# Patient Record
Sex: FEMALE | Race: WHITE | NOT HISPANIC OR LATINO | Employment: UNEMPLOYED | URBAN - METROPOLITAN AREA
[De-identification: names, ages, dates, MRNs, and addresses within clinical notes are randomized per-mention and may not be internally consistent; named-entity substitution may affect disease eponyms.]

---

## 2018-10-30 ENCOUNTER — EVALUATION (OUTPATIENT)
Dept: PHYSICAL THERAPY | Facility: CLINIC | Age: 38
End: 2018-10-30
Payer: COMMERCIAL

## 2018-10-30 ENCOUNTER — TRANSCRIBE ORDERS (OUTPATIENT)
Dept: PHYSICAL THERAPY | Facility: CLINIC | Age: 38
End: 2018-10-30

## 2018-10-30 DIAGNOSIS — M25.561 CHRONIC PAIN OF RIGHT KNEE: Primary | ICD-10-CM

## 2018-10-30 DIAGNOSIS — M25.561 ACUTE PAIN OF RIGHT KNEE: Primary | ICD-10-CM

## 2018-10-30 DIAGNOSIS — G89.29 CHRONIC PAIN OF RIGHT KNEE: Primary | ICD-10-CM

## 2018-10-30 PROCEDURE — 97161 PT EVAL LOW COMPLEX 20 MIN: CPT | Performed by: PHYSICAL THERAPIST

## 2018-10-30 PROCEDURE — 97140 MANUAL THERAPY 1/> REGIONS: CPT | Performed by: PHYSICAL THERAPIST

## 2018-10-30 PROCEDURE — G8979 MOBILITY GOAL STATUS: HCPCS | Performed by: PHYSICAL THERAPIST

## 2018-10-30 PROCEDURE — G8978 MOBILITY CURRENT STATUS: HCPCS | Performed by: PHYSICAL THERAPIST

## 2018-10-30 NOTE — LETTER
2018    Luias Woodard MD  [address] [facility]  [address]  [address] [employer]    Patient: Ofelia Branham   YOB: 1980   Date of Visit: 10/30/2018     Encounter Diagnosis     ICD-10-CM    1  Acute pain of right knee M25 561        Dear Dr Campa Pro:    Please review the attached Plan of Care from [facility] Carmelo's recent visit  Please verify that you agree therapy should continue by signing the attached document and sending it back to our office  If you have any questions or concerns, please don't hesitate to call  Sincerely,    Sergey Bhatia, PT      Referring Provider:      I certify that I have read the below Plan of Care and certify the need for these services furnished under this plan of treatment while under my care  Luisa Woodard MD  [address] [facility]  [address] [facility]: [phone]          PT Evaluation     Today's date: 10/30/2018  Patient name: Ofelia Branham  : 1980  MRN: [id number]  Referring provider: Wil Wilkerson MD  Dx:   Encounter Diagnosis     ICD-10-CM    1  Chronic pain of right knee M25 561     G89 29                   Assessment  Impairments: abnormal or restricted ROM, activity intolerance, impaired physical strength, lacks appropriate home exercise program and pain with function    Assessment details: Pt is a 43year old female presenting to outpatient Physical Therapy with primary complaints of right knee pain  Pt presents with decreased mobility of R patella, tight R Iliopoas musculature, decreased R knee flexion AROM with pain, and overall decreased functional mobility  These physical deficits are preventing the patient from participating in usual activities such as ascending/descening stairs with a reciprocal pattern, walking for prolonged periods of time, and running for exercise  Pt would benefit from skilled PT services in order to address these deficits and reach maximum level of function   Thank you kindly for the referral!  Barriers to therapy: None  Understanding of Dx/Px/POC: good   Prognosis: good    Goals  ST  The patient will be fully compliant with HEP within two weeks  2 Pt will demonstrate equal bilateral girth at the patella joint indicating resolved R knee swelling within three weeks  3 Pt will demonstrate equal B knee flexion AROM without pain within three weeks  LT  The patient will increase FOTO score to 83 within eight weeks  2 The patient will report full return to running for exercise indicating return to functional baseline within eight weeks  3 Pt will demonstrate 5/5 R knee strength in order to ascend/descend stairs with a reciprocal pattern within eight weeks  Plan  Patient would benefit from: skilled physical therapy  Planned modality interventions: thermotherapy: hydrocollator packs, cryotherapy, ultrasound and TENS  Planned therapy interventions: therapeutic activities, therapeutic exercise, home exercise program, manual therapy, joint mobilization, balance, patient education, neuromuscular re-education, stretching and graded exercise  Frequency: 2x week  Duration in weeks: 8  Plan of Care beginning date: 10/30/2018  Plan of Care expiration date: 2018  Treatment plan discussed with: patient        Subjective Evaluation    History of Present Illness  Date of onset: 10/1/2018  Mechanism of injury: Pt's pain began in her R knee with swelling after she began running for exercise in the beginning of September  She was running for a month when the knee pain began  She was running intervals five days a week using a couch to [employer]  She was up to running a total of a mile and half  She went to see her PCP on  for the pain, who told her to rest, ice, and elevate  She notes that the pain then went down but continued to be "swollen in spots " At the end of each day the knee feels stiff, hot, and sore     Not a recurrent problem   Pain  Current pain rating: 0  At best pain ratin  At worst pain ratin  Location: R knee  Quality: dull ache ("Stiffness", "tightness")  Relieving factors: ice, medications, relaxation and rest  Aggravating factors: running, stair climbing and walking    Social Support  Steps to enter house: yes  Stairs in house: yes   Lives in: multiple-level home  Lives with: spouse and young children      Diagnostic Tests  X-ray: normal  Patient Goals  Patient goals for therapy: increased motion, decreased pain, decreased edema and return to sport/leisure activities  Patient goal: return to running for exercise        Objective     Static Posture     Ankle/Foot   Ankle/Foot (Left): Pes planus  Ankle/Foot (Right): Pes planus  Joint Play     Additional Joint Play Details  R patella demonstrated hypomobility with medial glides  Strength/Myotome Testing     Left Hip   Planes of Motion   Flexion: 4+  Extension: 4+  Abduction: 4+    Right Hip   Planes of Motion   Flexion: 4  Extension: 4  Abduction: 4+    Left Knee   Flexion: 4+  Extension: 4+    Right Knee   Flexion: 4  Extension: 4    Left Ankle/Foot   Dorsiflexion: 5  Plantar flexion: 5  Inversion: 5  Eversion: 5    Right Ankle/Foot   Dorsiflexion: 5  Plantar flexion: 5  Inversion: 5  Eversion: 5    Tests     Left Hip   Negative modified Jarocho  Right Hip   Positive Ely's  Swelling     Left Knee Girth Measurement (cm)   Joint line: 34 cm    Right Knee Girth Measurement (cm)   Joint line: 36 cm    Functional Assessment   Squat   Pain and sitting toward left side         Flowsheet Rows      Most Recent Value   PT/OT G-Codes   Current Score  76   Projected Score  83          Precautions: Standard  Daily Treatment Diary     Manual  10/30            R patellar medial glides 8'                                                                    Exercise Diary              Stationary Bike             Calf Stretch At Block             Standing Hip flexor stretch with stool             Step ups/downs             Lateral step Ups             Mini Squats             SLS on pad             Monster Walks with TB                          Dc Khalilught with Nelida GONZALEZR              S/L hip Abduction             Prone Hip extension                          Treadmill When Ready                                                                                 Modalities              CP

## 2018-10-30 NOTE — PROGRESS NOTES
PT Evaluation     Today's date: 10/30/2018  Patient name: Joellen Ferro  : 1980  MRN: [id number]  Referring provider: Michelle Bowles MD  Dx:   Encounter Diagnosis     ICD-10-CM    1  Chronic pain of right knee M25 561     G89 29                   Assessment  Impairments: abnormal or restricted ROM, activity intolerance, impaired physical strength, lacks appropriate home exercise program and pain with function    Assessment details: Pt is a 43year old female presenting to outpatient Physical Therapy with primary complaints of right knee pain  Pt presents with decreased mobility of R patella, tight R Iliopoas musculature, decreased R knee flexion AROM with pain, and overall decreased functional mobility  These physical deficits are preventing the patient from participating in usual activities such as ascending/descening stairs with a reciprocal pattern, walking for prolonged periods of time, and running for exercise  Pt would benefit from skilled PT services in order to address these deficits and reach maximum level of function  Thank you kindly for the referral!  Barriers to therapy: None  Understanding of Dx/Px/POC: good   Prognosis: good    Goals  ST  The patient will be fully compliant with HEP within two weeks  2 Pt will demonstrate equal bilateral girth at the patella joint indicating resolved R knee swelling within three weeks  3 Pt will demonstrate equal B knee flexion AROM without pain within three weeks  LT  The patient will increase FOTO score to 83 within eight weeks  2 The patient will report full return to running for exercise indicating return to functional baseline within eight weeks  3 Pt will demonstrate 5/5 R knee strength in order to ascend/descend stairs with a reciprocal pattern within eight weeks       Plan  Patient would benefit from: skilled physical therapy  Planned modality interventions: thermotherapy: hydrocollator packs, cryotherapy, ultrasound and TENS  Planned therapy interventions: therapeutic activities, therapeutic exercise, home exercise program, manual therapy, joint mobilization, balance, patient education, neuromuscular re-education, stretching and graded exercise  Frequency: 2x week  Duration in weeks: 8  Plan of Care beginning date: 10/30/2018  Plan of Care expiration date: 2018  Treatment plan discussed with: patient        Subjective Evaluation    History of Present Illness  Date of onset: 10/1/2018  Mechanism of injury: Pt's pain began in her R knee with swelling after she began running for exercise in the beginning of September  She was running for a month when the knee pain began  She was running intervals five days a week using a couch to [employer]  She was up to running a total of a mile and half  She went to see her PCP on  for the pain, who told her to rest, ice, and elevate  She notes that the pain then went down but continued to be "swollen in spots " At the end of each day the knee feels stiff, hot, and sore  Not a recurrent problem   Pain  Current pain ratin  At best pain ratin  At worst pain ratin  Location: R knee  Quality: dull ache ("Stiffness", "tightness")  Relieving factors: ice, medications, relaxation and rest  Aggravating factors: running, stair climbing and walking    Social Support  Steps to enter house: yes  Stairs in house: yes   Lives in: multiple-level home  Lives with: spouse and young children      Diagnostic Tests  X-ray: normal  Patient Goals  Patient goals for therapy: increased motion, decreased pain, decreased edema and return to sport/leisure activities  Patient goal: return to running for exercise        Objective     Static Posture     Ankle/Foot   Ankle/Foot (Left): Pes planus  Ankle/Foot (Right): Pes planus  Joint Play     Additional Joint Play Details  R patella demonstrated hypomobility with medial glides       Strength/Myotome Testing     Left Hip   Planes of Motion Flexion: 4+  Extension: 4+  Abduction: 4+    Right Hip   Planes of Motion   Flexion: 4  Extension: 4  Abduction: 4+    Left Knee   Flexion: 4+  Extension: 4+    Right Knee   Flexion: 4  Extension: 4    Left Ankle/Foot   Dorsiflexion: 5  Plantar flexion: 5  Inversion: 5  Eversion: 5    Right Ankle/Foot   Dorsiflexion: 5  Plantar flexion: 5  Inversion: 5  Eversion: 5    Tests     Left Hip   Negative modified Jarocho  Right Hip   Positive Ely's  Swelling     Left Knee Girth Measurement (cm)   Joint line: 34 cm    Right Knee Girth Measurement (cm)   Joint line: 36 cm    Functional Assessment   Squat   Pain and sitting toward left side         Flowsheet Rows      Most Recent Value   PT/OT G-Codes   Current Score  76   Projected Score  83          Precautions: Standard  Daily Treatment Diary     Manual  10/30            R patellar medial glides 8'                                                                    Exercise Diary              Stationary Bike             Calf Stretch At Block             Standing Hip flexor stretch with stool             Step ups/downs             Lateral step Ups             Mini Squats             SLS on pad             Monster Walks with TB                          Evan Stretch             Bridges with Marches             SLR              S/L hip Abduction             Prone Hip extension                          Treadmill When Ready                                                                                 Modalities              CP

## 2018-11-01 ENCOUNTER — OFFICE VISIT (OUTPATIENT)
Dept: PHYSICAL THERAPY | Facility: CLINIC | Age: 38
End: 2018-11-01
Payer: COMMERCIAL

## 2018-11-01 DIAGNOSIS — M25.561 ACUTE PAIN OF RIGHT KNEE: Primary | ICD-10-CM

## 2018-11-01 PROCEDURE — 97110 THERAPEUTIC EXERCISES: CPT | Performed by: PHYSICAL THERAPIST

## 2018-11-01 PROCEDURE — 97140 MANUAL THERAPY 1/> REGIONS: CPT | Performed by: PHYSICAL THERAPIST

## 2018-11-01 PROCEDURE — 97112 NEUROMUSCULAR REEDUCATION: CPT | Performed by: PHYSICAL THERAPIST

## 2018-11-01 NOTE — PROGRESS NOTES
Daily Note     Today's date: 2018  Patient name: Lucy Carter  : 1980  MRN: [id number]  Referring provider: Shirlee Boas, MD  Dx:   Encounter Diagnosis     ICD-10-CM    1  Acute pain of right knee M25 561                   Subjective: Pt reports that she has not had any changes in R knee pain since there initial evaluation      Objective: See treatment diary below      Assessment: Pt demonstrated moderate crepitus of R knee during TE with high levels of flexion, such as step ups and step downs  Her crepitus decreased as she progressed through weight bearing TE with knee flexion, such as mini squats  Pt demonstrated R quad weakness, as she was unable to perform step downs leading with L LE using 8" step without compensation of internal hip rotation  Pt was able to complete the exercise with good form using the 6" step  Pt continues to demonstrate R>L iliopsoas muscle tightness, with hip flexion during prone quad stretch  Plan: Continue per plan of care         Precautions: Standard  Daily Treatment Diary     Manual  10/30 11/1           R patellar medial glides 8' 8'                                                                   Exercise Diary              Stationary Bike 5'            Calf Stretch At Block 3 x30"            Standing Hip flexor stretch with stool 3 x30"            Step ups/downs Up: 8"  Down: 6"  x20            Lateral step Ups 8"  x20            Mini Squats with slope x20            SLS on pad NP            Kristopher Walks with TB NP                         Evan Stretch 2 x30" DC           Prone quad stretch with strap 3 x30"            Bridges with Marches 2 x15            SLR  2 x15 each            S/L hip Abduction             Prone Hip extension                          Treadmill When Ready                                                                                 Modalities              CP

## 2018-11-05 ENCOUNTER — OFFICE VISIT (OUTPATIENT)
Dept: PHYSICAL THERAPY | Facility: CLINIC | Age: 38
End: 2018-11-05
Payer: COMMERCIAL

## 2018-11-05 DIAGNOSIS — M25.561 ACUTE PAIN OF RIGHT KNEE: Primary | ICD-10-CM

## 2018-11-05 PROCEDURE — 97112 NEUROMUSCULAR REEDUCATION: CPT | Performed by: PHYSICAL THERAPIST

## 2018-11-05 PROCEDURE — 97110 THERAPEUTIC EXERCISES: CPT | Performed by: PHYSICAL THERAPIST
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
## 2018-11-08 ENCOUNTER — OFFICE VISIT (OUTPATIENT)
Dept: PHYSICAL THERAPY | Facility: CLINIC | Age: 38
End: 2018-11-08
Payer: COMMERCIAL

## 2018-11-08 DIAGNOSIS — M25.561 ACUTE PAIN OF RIGHT KNEE: Primary | ICD-10-CM

## 2018-11-08 PROCEDURE — 97110 THERAPEUTIC EXERCISES: CPT | Performed by: PHYSICAL THERAPIST

## 2018-11-08 NOTE — PROGRESS NOTES
Daily Note     Today's date: 2018  Patient name: Evelina Castorena  : 1980  MRN: [id number]  Referring provider: Matilde Bella MD  Dx:   Encounter Diagnosis     ICD-10-CM    1  Acute pain of right knee M25 561                   Subjective:Pt reports that she did not experience any knee swelling following her last PT visit  She notes that there are no new changes to report  Objective: See treatment diary below      Assessment: Pt tolerated TE well, with no complaints of pain during squats or SLS  She continues to demonstrate moderate hypomobility of R patella medially, which improves mildly with manual glides  Per pt request she was given a comprehensive HEP and will be changing sessions to a half hour due to financial reasons  Plan: Progress treatment as tolerated              Precautions: Standard  Daily Treatment Diary     Manual  10/30 11/1 11/8          R patellar medial glides 8' 8' 5'                                                                  Exercise Diary            Stationary Bike 5' 8' 8'          Calf Stretch At Block 3 x30" Seated with strap  3 x30" 3 x30" each HEP         Standing Hip flexor stretch with stool 3 x30" NP HEP          Step ups/downs Up: 8"  Down: 6"  x20 NP NV          Lateral step Ups 8"  x20 NP NV          Mini Squats with slope x20 NP 3 x10          SLS on pad NP Black Pad  10" x5 20" x5          Monster Walks with TB NP NP NV                       Prone quad stretch with strap 3 x30" 3 x30"  3 x30"          Bridges with Marches 2 x15 2 x10 HEP          SLR  2 x15 each 2 x15 each HEP          S/L hip Abduction  2 x15 each HEP          Prone Hip extension  2x15 each HEP          Bird Dog  x10 each HEP          Treadmill When Ready            Seated Foot Arch Raises  x30 each x40 each                                                                  Modalities              CP                                           HEP: Cullen Carroll kneeling hip flexor stretch, gastroc and soleus stretch, bird dogs, bridges with marches, SLR into hip abd, flexion, and extension

## 2018-11-12 ENCOUNTER — OFFICE VISIT (OUTPATIENT)
Dept: PHYSICAL THERAPY | Facility: CLINIC | Age: 38
End: 2018-11-12
Payer: COMMERCIAL

## 2018-11-12 DIAGNOSIS — M25.561 ACUTE PAIN OF RIGHT KNEE: Primary | ICD-10-CM

## 2018-11-12 PROCEDURE — 97110 THERAPEUTIC EXERCISES: CPT

## 2018-11-12 PROCEDURE — 97112 NEUROMUSCULAR REEDUCATION: CPT

## 2018-11-12 NOTE — PROGRESS NOTES
Daily Note     Today's date: 2018  Patient name: Eve Severs  : 1980  MRN: [id number]  Referring provider: Ledy Delgado MD  Dx:   Encounter Diagnosis     ICD-10-CM    1  Acute pain of right knee M25 561                   Subjective:Pt states her knee feels the same, still gets clicking and popping  Objective: See treatment diary below      Assessment: Performed below with fair tolerance and good technique  Continue to progress as tolerated upon nv  Plan: Progress treatment as tolerated              Precautions: Standard  Daily Treatment Diary     Manual  10/30 11/1 11/8 11/2         R patellar medial glides 8' 8' 5' 5'                                                                 Exercise Diary           Stationary Bike 5' 8' 8' 8'         Calf Stretch At Block 3 x30" Seated with strap  3 x30" 3 x30" each HEP         Standing Hip flexor stretch with stool 3 x30" NP HEP hep         Step ups/downs Up: 8"  Down: 6"  x20 NP NV Up: 8"  Down: 6"  x20         Lateral step Ups 8"  x20 NP NV 8"  x20         Mini Squats with slope x20 NP 3 x10 nv         SLS on pad NP Black Pad  10" x5 20" x5 20"X5         Monster Walks with TB NP NP NV 4laps  OTB                      Prone quad stretch with strap 3 x30" 3 x30"  3 x30" 3x30"         Bridges with Marches 2 x15 2 x10 HEP hep         SLR  2 x15 each 2 x15 each HEP hep         S/L hip Abduction  2 x15 each HEP hep         Prone Hip extension  2x15 each HEP hep         Bird Dog  x10 each HEP hep         Treadmill When Ready            Seated Foot Arch Raises  x30 each x40 each x40  ea                                                                 Modalities              CP                                           HEP: Evan Stretch, kneeling hip flexor stretch, gastroc and soleus stretch, bird dogs, bridges with marches, SLR into hip abd, flexion, and extension

## 2018-11-15 ENCOUNTER — OFFICE VISIT (OUTPATIENT)
Dept: PHYSICAL THERAPY | Facility: CLINIC | Age: 38
End: 2018-11-15
Payer: COMMERCIAL

## 2018-11-15 DIAGNOSIS — M25.561 ACUTE PAIN OF RIGHT KNEE: Primary | ICD-10-CM

## 2018-11-15 PROCEDURE — 97110 THERAPEUTIC EXERCISES: CPT

## 2018-11-15 NOTE — PROGRESS NOTES
Daily Note     Today's date: 11/15/2018  Patient name: Margy Lai  : 1980  MRN: [id number]  Referring provider: Mady Watson MD  Dx:   Encounter Diagnosis     ICD-10-CM    1  Acute pain of right knee M25 561                   Subjective:Pt states no major difference  Advised pt to ice to aid in muscle soreness  Objective: See treatment diary below      Assessment: Performed below TE and introduced new TE with good tolerance and fair technique  Noted during step ups, pt R knee goes into genu valgus, used a BTB to promote abductor strengthening/genu varus  As well noted during monster walk pt will go into hip ER, post correction technique was much better  Continue to monitor form of pt to engage appropriate muscles throughout TE  Plan: Progress treatment as tolerated              Precautions: Standard  Daily Treatment Diary     Manual  10/30 11/1 11/8 11/2 11/15        R patellar medial glides 8' 8' 5' 5' nv                                                                Exercise Diary  11/1 11/5 11/8 11/12 11/15        Stationary Bike 5' 8' 8' 8' np        Calf Stretch At Block 3 x30" Seated with strap  3 x30" 3 x30" each HEP hep        Standing Hip flexor stretch with stool 3 x30" NP HEP hep hep        Step ups/downs Up: 8"  Down: 6"  x20 NP NV Up: 8"  Down: 6"  x20 Up:  8"  x20  w/ BTB   Pull into valgus        Lateral step Ups 8"  x20 NP NV 8"  x20 6"  x20  (laterla dips)        Mini Squats with slope x20 NP 3 x10 nv 3x10  On bosu        SLS on pad NP Black Pad  10" x5 20" x5 20"X5 np        Monster Walks with TB NP NP NV 4laps  OTB 4laps  GTB                     Prone quad stretch with strap 3 x30" 3 x30"  3 x30" 3x30" np        Bridges with Marches 2 x15 2 x10 HEP hep hep        SLR  2 x15 each 2 x15 each HEP hep hep        S/L hip Abduction  2 x15 each HEP hep hep        Prone Hip extension  2x15 each HEP hep hep        Bird Dog  x10 each HEP hep hep        Treadmill When Ready 5'  2 0mph        Seated Foot Arch Raises  x30 each x40 each x40  ea hep        Forward dips     L1  2x10        Combo  chair       L3   2W  3"x20                                      Modalities  11/1            CP                                           HEP: Evan Stretch, kneeling hip flexor stretch, gastroc and soleus stretch, bird dogs, bridges with marches, SLR into hip abd, flexion, and extension

## 2018-11-19 ENCOUNTER — OFFICE VISIT (OUTPATIENT)
Dept: PHYSICAL THERAPY | Facility: CLINIC | Age: 38
End: 2018-11-19
Payer: COMMERCIAL

## 2018-11-19 DIAGNOSIS — M25.561 ACUTE PAIN OF RIGHT KNEE: Primary | ICD-10-CM

## 2018-11-19 PROCEDURE — 97110 THERAPEUTIC EXERCISES: CPT

## 2018-11-19 NOTE — PROGRESS NOTES
Daily Note     Today's date: 2018  Patient name: Joellen Ferro  : 1980  MRN: [id number]  Referring provider: Michelle Bowles MD  Dx:   Encounter Diagnosis     ICD-10-CM    1  Acute pain of right knee M25 561                   Subjective:Pt states feeling sore Thursday night after her visit into Saturday, felt her knee very tired and sore  Objective: See treatment diary below      Assessment: Performed below TE with good tolerance and technique  Trial k-tape before performing TE, no significant change; however, continue to monitor symptoms  Noted R knee still goes into genu valgus, introduced a GTB during minisquats on bosu  Post VCs and TCs pt form improved  Continue to monitor form of pt to engage appropriate muscles throughout TE  Plan: Progress treatment as tolerated              Precautions: Standard  Daily Treatment Diary     Manual  10/30 11/1 11/8 11/2 11/15 11/19       R patellar medial glides 8' 8' 5' 5' nv 5'       k-tape      3'             8'                                     Exercise Diary  11/1 11/5 11/8 11/12 11/15 11/19       Stationary Bike 5' 8' 8' 8' np        Calf Stretch At Block 3 x30" Seated with strap  3 x30" 3 x30" each HEP hep hep       Standing Hip flexor stretch with stool 3 x30" NP HEP hep hep Hep         Step ups/downs Up: 8"  Down: 6"  x20 NP NV Up: 8"  Down: 6"  x20 Up:  8"  x20  w/ BTB   Pull into valgus Up:  8"  x20  w/ band   Pull into valgus       Lateral step Ups 8"  x20 NP NV 8"  x20 6"  x20  (laterla dips) 6"  x20  (lateral dips)       Mini Squats with slope x20 NP 3 x10 nv 3x10  On bosu 3x10  On bosu  W/ GTB       SLS on pad NP Black Pad  10" x5 20" x5 20"X5 np        [de-identified] Walks with TB NP NP NV 4laps  OTB 4laps  GTB 4laps  GTB                    Prone quad stretch with strap 3 x30" 3 x30"  3 x30" 3x30" np np       Bridges with Marches 2 x15 2 x10 HEP hep hep hep       SLR  2 x15 each 2 x15 each HEP hep hep hep       S/L hip Abduction  2 x15 each HEP hep hep hep       Prone Hip extension  2x15 each HEP hep hep hep       Bird Dog  x10 each HEP hep hep hep       Treadmill When Ready    5'  2 0mph 5'  2 0mph       Seated Foot Arch Raises  x30 each x40 each x40  ea hep hep       Forward dips     L1  2x10 L1  2x10       Combo  chair       L3   2W  3"x20 L3  2W  3"x20       Clamshells      GTB  10"X10                        Modalities  11/1            CP                                           HEP: Evan Stretch, kneeling hip flexor stretch, gastroc and soleus stretch, bird dogs, bridges with marches, SLR into hip abd, flexion, and extension

## 2018-11-28 ENCOUNTER — OFFICE VISIT (OUTPATIENT)
Dept: PHYSICAL THERAPY | Facility: CLINIC | Age: 38
End: 2018-11-28
Payer: COMMERCIAL

## 2018-11-28 DIAGNOSIS — M25.561 ACUTE PAIN OF RIGHT KNEE: Primary | ICD-10-CM

## 2018-11-28 PROCEDURE — G8979 MOBILITY GOAL STATUS: HCPCS

## 2018-11-28 PROCEDURE — G8978 MOBILITY CURRENT STATUS: HCPCS

## 2018-11-28 PROCEDURE — 97110 THERAPEUTIC EXERCISES: CPT

## 2018-11-28 PROCEDURE — 97112 NEUROMUSCULAR REEDUCATION: CPT

## 2018-11-28 PROCEDURE — 97140 MANUAL THERAPY 1/> REGIONS: CPT

## 2018-11-28 NOTE — PROGRESS NOTES
Daily Note     Today's date: 2018  Patient name: Edwardo Williamson  : 1980  MRN: [id number]  Referring provider: Jayda Orellana MD  Dx:   Encounter Diagnosis     ICD-10-CM    1  Acute pain of right knee M25 561                   Subjective: Pt states k-tape has helped her pain/discomfort a lot  Objective: See treatment diary below      Assessment: Introduced new TE and progressed TE with good tolerance and technique  Continue to utilize k-tape before TE during the session, monitor symptoms upon nv  Plan: Progress treatment as tolerated  Dispensed strengthen HEP to pt upon nv             Precautions: Standard  Daily Treatment Diary     Manual  10/30 11/1 11/8 11/2 11/15 11/19 11/28      R patellar medial glides 8' 8' 5' 5' nv 5' 5      k-tape      3' 3            8' 8'                                    Exercise Diary  11/1 11/5 11/8 11/12 11/15 11/19 11/28      Stationary Bike 5' 8' 8' 8' np        Calf Stretch At Block 3 x30" Seated with strap  3 x30" 3 x30" each HEP hep hep Hep      Standing Hip flexor stretch with stool 3 x30" NP HEP hep hep Hep   hep      Step ups/downs Up: 8"  Down: 6"  x20 NP NV Up: 8"  Down: 6"  x20 Up:  8"  x20  w/ BTB   Pull into valgus Up:  8"  x20  w/ band   Pull into valgus Up:  8"  x30  w/ band   Pull into valgus      Lateral step Ups 8"  x20 NP NV 8"  x20 6"  x20  (laterla dips) 6"  x20  (lateral dips) 6"  x20  (lateral dips)      Mini Squats with slope x20 NP 3 x10 nv 3x10  On bosu 3x10  On bosu  W/ GTB 3x10  On bosu  W/ GTB      SLS on pad NP Black Pad  10" x5 20" x5 20"X5 np  30"x3      Monster Walks with TB NP NP NV 4laps  OTB 4laps  GTB 4laps  GTB 4laps  GTB                     Prone quad stretch with strap 3 x30" 3 x30"  3 x30" 3x30" np np np      Bridges with Marches 2 x15 2 x10 HEP hep hep hep hep      SLR  2 x15 each 2 x15 each HEP hep hep hep hep      S/L hip Abduction  2 x15 each HEP hep hep hep hep      Prone Hip extension  2x15 each HEP hep hep hep hep      Bird Dog  x10 each HEP hep hep hep hep      Treadmill When Ready    5'  2 0mph 5'  2 0mph 5'  2 0mph      Seated Foot Arch Raises  x30 each x40 each x40  ea hep hep hep      Forward dips     L1  2x10 L1  2x10 L2  2x10      Combo  chair       L3   2W  3"x20 L3  2W  3"x20 L3  2W  3"x20      Clamshells      GTB  10"X10 BTB  10x10"      Bridges w/ fitness [place] lite       10"X10          Modalities  11/1            CP                                           HEP: Evan Stretch, kneeling hip flexor stretch, gastroc and soleus stretch, bird dogs, bridges with marches, SLR into hip abd, flexion, and extension

## 2018-12-03 ENCOUNTER — OFFICE VISIT (OUTPATIENT)
Dept: PHYSICAL THERAPY | Facility: CLINIC | Age: 38
End: 2018-12-03
Payer: COMMERCIAL

## 2018-12-03 DIAGNOSIS — M25.561 ACUTE PAIN OF RIGHT KNEE: Primary | ICD-10-CM

## 2018-12-03 PROCEDURE — 97110 THERAPEUTIC EXERCISES: CPT

## 2018-12-03 PROCEDURE — 97140 MANUAL THERAPY 1/> REGIONS: CPT

## 2018-12-03 NOTE — PROGRESS NOTES
Daily Note     Today's date: 12/3/2018  Patient name: Margy Lai  : 1980  MRN: [id number]  Referring provider: Mady Watson MD  Dx:   Encounter Diagnosis     ICD-10-CM    1  Acute pain of right knee M25 561                   Subjective: Pt states once she removed k-tape the knee started to bother her  Objective: See treatment diary below      Assessment: Focused on manuals due to R knee discomfort  Trial Leukotape to B feet to aid in decreasing R knee pain  Plan: Progress treatment as tolerated  Dispensed strengthen HEP to pt upon nv             Precautions: Standard  Daily Treatment Diary     Manual  10/30 11/1 11/8 11/2 11/15 11/19 11/28 12/3     R patellar medial glides 8' 8' 5' 5' nv 5' 5 5     k-tape      3' 3 3     Leukotape  B feet        10           8' 8' 18                                   Exercise Diary  11/1 11/5 11/8 11/12 11/15 11/19 11/28 12/3     Stationary Bike 5' 8' 8' 8' np        Calf Stretch At Block 3 x30" Seated with strap  3 x30" 3 x30" each HEP hep hep Hep -     Standing Hip flexor stretch with stool 3 x30" NP HEP hep hep Hep   hep -     Step ups/downs Up: 8"  Down: 6"  x20 NP NV Up: 8"  Down: 6"  x20 Up:  8"  x20  w/ BTB   Pull into valgus Up:  8"  x20  w/ band   Pull into valgus Up:  8"  x30  w/ band   Pull into valgus nv     Lateral step Ups 8"  x20 NP NV 8"  x20 6"  x20  (laterla dips) 6"  x20  (lateral dips) 6"  x20  (lateral dips) nv     Mini Squats with slope x20 NP 3 x10 nv 3x10  On bosu 3x10  On bosu  W/ GTB 3x10  On bosu  W/ GTB nv     SLS on pad NP Black Pad  10" x5 20" x5 20"X5 np  30"x3 nv     Monster Walks with TB NP NP NV 4laps  OTB 4laps  GTB 4laps  GTB 4laps  GTB   nv                  Prone quad stretch with strap 3 x30" 3 x30"  3 x30" 3x30" np np np np     Bridges with Marches 2 x15 2 x10 HEP hep hep hep hep -     SLR  2 x15 each 2 x15 each HEP hep hep hep hep -     S/L hip Abduction  2 x15 each HEP hep hep hep hep -     Prone Hip extension  2x15 each HEP hep hep hep hep -     Bird Dog  x10 each HEP hep hep hep hep -     Treadmill When Ready    5'  2 0mph 5'  2 0mph 5'  2 0mph 7'  2 0mph     Seated Foot Arch Raises  x30 each x40 each x40  ea hep hep hep -     Forward dips     L1  2x10 L1  2x10 L2  2x10 nv     Combo  chair       L3   2W  3"x20 L3  2W  3"x20 L3  2W  3"x20 nv     Clamshells      GTB  10"X10 BTB  10x10" BTB  10"X10     Bridges w/ fitness [place] lite       10"X10 10"x10         Modalities  11/1            CP                                           HEP: Evan Stretch, kneeling hip flexor stretch, gastroc and soleus stretch, bird dogs, bridges with marches, SLR into hip abd, flexion, and extension

## 2018-12-05 ENCOUNTER — OFFICE VISIT (OUTPATIENT)
Dept: PHYSICAL THERAPY | Facility: CLINIC | Age: 38
End: 2018-12-05
Payer: COMMERCIAL

## 2018-12-05 DIAGNOSIS — M25.561 ACUTE PAIN OF RIGHT KNEE: Primary | ICD-10-CM

## 2018-12-05 PROCEDURE — 97110 THERAPEUTIC EXERCISES: CPT

## 2018-12-05 PROCEDURE — 97140 MANUAL THERAPY 1/> REGIONS: CPT

## 2018-12-05 NOTE — PROGRESS NOTES
Daily Note     Today's date: 2018  Patient name: Evelina Castorena  : 1980  MRN: [id number]  Referring provider: Matilde Bella MD  Dx:   Encounter Diagnosis     ICD-10-CM    1  Acute pain of right knee M25 561                   Subjective: Pt states knee has not been hurting her, believe the leukotape and the k-tape on her R knee has helped her  Objective: See treatment diary below      Assessment: Performed below with fair tolerance and technique  Pt was unable to complete standing TE sets due to pain and clicking  Plan: Progress treatment as tolerated               Precautions: Standard  Daily Treatment Diary     Manual  10/30 11/1 11/8 11/2 11/15 11/19 11/28 12/3 12/5    R patellar medial glides 8' 8' 5' 5' nv 5' 5 5 nv    k-tape      3' 3 3 5    Leukotape  B feet        10 13          8' 8' 18 18                                  Exercise Diary  11/1 11/5 11/8 11/12 11/15 11/19 11/28 12/3 12    Stationary Bike 5' 8' 8' 8' np        Calf Stretch At Block 3 x30" Seated with strap  3 x30" 3 x30" each HEP hep hep Hep - hep    Standing Hip flexor stretch with stool 3 x30" NP HEP hep hep Hep   hep - hep    Step ups/downs Up: 8"  Down: 6"  x20 NP NV Up: 8"  Down: 6"  x20 Up:  8"  x20  w/ BTB   Pull into valgus Up:  8"  x20  w/ band   Pull into valgus Up:  8"  x30  w/ band   Pull into valgus nv Up:  8"  x30  w/ band   Pull into valgus    Lateral step Ups 8"  x20 NP NV 8"  x20 6"  x20  (laterla dips) 6"  x20  (lateral dips) 6"  x20  (lateral dips) nv 4"x10    Mini Squats with slope x20 NP 3 x10 nv 3x10  On bosu 3x10  On bosu  W/ GTB 3x10  On bosu  W/ GTB nv 3x10  On bosu  W/BTB    SLS on pad NP Black Pad  10" x5 20" x5 20"X5 np  30"x3 nv 30"X3  ea    Monster Walks with TB NP NP NV 4laps  OTB 4laps  GTB 4laps  GTB 4laps  GTB   nv 2laps  BTB                 Prone quad stretch with strap 3 x30" 3 x30"  3 x30" 3x30" np np np np     Bridges with [place] 2 x15 2 x10 HEP hep hep hep hep -     SLR  2 x15 each 2 x15 each HEP hep hep hep hep -     S/L hip Abduction  2 x15 each HEP hep hep hep hep -     Prone Hip extension  2x15 each HEP hep hep hep hep -     Bird Dog  x10 each HEP hep hep hep hep -     Treadmill When Ready    5'  2 0mph 5'  2 0mph 5'  2 0mph 7'  2 0mph 5'  2 0mph    Seated Foot Arch Raises  x30 each x40 each x40  ea hep hep hep -     Forward dips     L1  2x10 L1  2x10 L2  2x10 nv L1   10x    Combo  chair       L3   2W  3"x20 L3  2W  3"x20 L3  2W  3"x20 nv nv    Clamshells      GTB  10"X10 BTB  10x10" BTB  10"X10 hep    Bridges w/ fitness [place] lite       10"X10 10"x10 np    Forward lunges   On bosu         2x10  ea        Modalities  11/1            CP                                           HEP: Eavn Stretch, kneeling hip flexor stretch, gastroc and soleus stretch, bird dogs, bridges with marches, SLR into hip abd, flexion, and extension

## 2018-12-10 ENCOUNTER — EVALUATION (OUTPATIENT)
Dept: PHYSICAL THERAPY | Facility: CLINIC | Age: 38
End: 2018-12-10
Payer: COMMERCIAL

## 2018-12-10 DIAGNOSIS — M25.561 ACUTE PAIN OF RIGHT KNEE: Primary | ICD-10-CM

## 2018-12-10 PROCEDURE — 97140 MANUAL THERAPY 1/> REGIONS: CPT | Performed by: PHYSICAL THERAPIST

## 2018-12-10 PROCEDURE — 97110 THERAPEUTIC EXERCISES: CPT | Performed by: PHYSICAL THERAPIST

## 2018-12-10 NOTE — PROGRESS NOTES
Daily Note     Today's date: 12/10/2018  Patient name: Dunia Lopez  : 1980  MRN: [id number]  Referring provider: Devi Olivera MD  Dx:   Encounter Diagnosis     ICD-10-CM    1  Acute pain of right knee M25 561                   Subjective: Pt reports that she has an appointment to see a podiatrist about Orthotics for her R foot as she knows her feet have grown since she last had some made for her years ago  She continues to feel a pulling and clicking in her R knee that frustrates her  Objective: See treatment diary below      Assessment: Pt demonstrated significant hypomobility of her R inferior patella during medial glides, and moderate hypomobility inferiorly  Both of these directions provoked pt's symptoms and improved with more manual mobilizations  Pt also demonstrated involvement of R ITB during medial glides, indicating that the attachment to the patella is currently tight and would benefit from continued stretching and mobilizations  Pt responded well to the Leukotape, with improved patellar tracking during squats  Plan: Continue to focus on knee mobilizations and stretching of lateral patellar musculature           Precautions: Standard  Daily Treatment Diary     Manual  12/10            R patellar medial and inferior glides 12'            k-tape R lateral Patella            Leukotape  B feet                                                        Exercise Diary  12/10           Treadmill 5'           Step ups/downs  With pull band into [place]            Lateral step Ups +lateral dips             Mini Squats with slope on Bosu with BTB            SLS on pad with ball toss            [employer] with TB PTB                       ITB stretch with strap in supsine 3 x30"           Prone quad stretch with strap  (vastus latealis)  3 x10"           Forward dips            Combo  chair              Bridges w/ fitness [place] lite            Forward lunges   On bosu                Modalities CP                                           HEP: Evan Stretch, kneeling hip flexor stretch, gastroc and soleus stretch, bird dogs, bridges with marches, SLR into hip abd, flexion, and extension

## 2018-12-12 ENCOUNTER — OFFICE VISIT (OUTPATIENT)
Dept: PHYSICAL THERAPY | Facility: CLINIC | Age: 38
End: 2018-12-12
Payer: COMMERCIAL

## 2018-12-12 DIAGNOSIS — M25.561 ACUTE PAIN OF RIGHT KNEE: Primary | ICD-10-CM

## 2018-12-12 PROCEDURE — 97110 THERAPEUTIC EXERCISES: CPT | Performed by: PHYSICAL THERAPIST

## 2018-12-12 PROCEDURE — 97112 NEUROMUSCULAR REEDUCATION: CPT | Performed by: PHYSICAL THERAPIST

## 2018-12-12 NOTE — PROGRESS NOTES
Daily Note     Today's date: 2018  Patient name: Martha Sampson  : 1980  MRN: [id number]  Referring provider: Lian Wu MD  Dx:   Encounter Diagnosis     ICD-10-CM    1  Acute pain of right knee M25 561                   Subjective: Pt reports that she went to see her Podiatrist on Monday where he confirmed her diagnosis of patellar-femoral syndrome and felt confident that with a knee brace and new foot inserts she would start to feel better quickly  He also recommended her to continue to perform exercises supecific to runner's knee including yoga for a holistic approach  Objective: See treatment diary below      Assessment: Pt continues to demonstrate significant valgus of B knees during squats and step ups, indicating that the patient may benefit from increased focus of strengthening the gute medius  Plan: Continue with focus on knee eccentrics and glute med strengthening          Precautions: Standard  Daily Treatment Diary     Manual  12/10            R patellar medial and inferior glides 12'            k-tape R lateral Patella            Leukotape  B feet                                                        Exercise Diary  12/10 12/12          Treadmill 5' 5'          Step downs  8"  3#  x20          lateral dips   6"  x20 each          Mini Squats with slope on Bosu with BTB            SLS on pad with ball toss  Blue 2#  2 x10 each          W Walks with TB  OTB  4 laps                      ITB stretch with strap in supsine 3 x30"           Prone quad stretch with strap  (vastus latealis)  3 x10" 3 x30" each          Bridges w/ fitness [place] lite            S/L Hip Abduction   2#  2 x 15 each              Modalities              CP                                           HEP: Evan Stretch, kneeling hip flexor stretch, gastroc and soleus stretch, bird dogs, bridges with marches, SLR into hip abd, flexion, and extension

## 2018-12-17 ENCOUNTER — OFFICE VISIT (OUTPATIENT)
Dept: PHYSICAL THERAPY | Facility: CLINIC | Age: 38
End: 2018-12-17
Payer: COMMERCIAL

## 2018-12-17 DIAGNOSIS — M25.561 ACUTE PAIN OF RIGHT KNEE: Primary | ICD-10-CM

## 2018-12-17 PROCEDURE — 97110 THERAPEUTIC EXERCISES: CPT | Performed by: PHYSICAL THERAPIST

## 2018-12-17 NOTE — PROGRESS NOTES
PT Re-Evaluation/Discharge Summary  ** addendum 19: Pt is being discharged at this time as she has not been to therapy or called to set up an appointment in four weeks  Today's date: 2018  Patient name: Richard Ozuna  : 1980  MRN: [id number]  Referring provider: Emeka Andrews MD  Dx:   Encounter Diagnosis     ICD-10-CM    1  Acute pain of right knee M25 561                   Assessment  Assessment details: Pt is a 43year old female who has been coming to physical therapy for L knee pain with signs and symptoms consistent with patellar femoral pain syndrome  Pt has made some progress throughout the course of therapy, with reports of mild pain relief, and demonstrating improvements in hip strength  She continues to demonstrate weakness of her L hamstring and a unilateral weight shift during squats with reports of pain exacerbation  Pt was advised to contact an orthopedist specialist for a second opinion, as she has been coming to therapy consistently for over 6 weeks without significant improvement  Pt will be put on hold for two weeks  Impairments: activity intolerance, impaired physical strength and pain with function  Barriers to therapy: None  Understanding of Dx/Px/POC: good   Prognosis: fair    Goals  ST  The patient will be fully compliant with HEP within two weeks  -met   2  Pt will demonstrate equal bilateral girth at the patella joint indicating resolved R knee swelling within three weeks  -not met  3  Pt will demonstrate equal B knee flexion AROM without pain within three weeks  -met   LT  The patient will increase FOTO score to 83 within eight weeks  -not met   2  The patient will report full return to running for exercise indicating return to functional baseline within eight weeks  -not met   3  Pt will demonstrate 5/5 R knee strength in order to ascend/descend stairs with a reciprocal pattern within eight weeks  -partially met      Plan  Patient would benefit from: skilled physical therapy  Planned modality interventions: thermotherapy: hydrocollator packs, cryotherapy, ultrasound and TENS  Planned therapy interventions: therapeutic activities, therapeutic exercise, home exercise program, manual therapy, joint mobilization, balance, patient education, neuromuscular re-education, stretching and graded exercise  Frequency: 2x week  Duration in weeks: 8  Plan of Care beginning date: 10/30/2018  Plan of Care expiration date: 2018  Treatment plan discussed with: patient        Subjective Evaluation    History of Present Illness  Date of onset: 10/1/2018  Mechanism of injury: Pt reports that since the start of therapy she has not noticed many changes with her knee pain  She has decreased stiffness and soreness at the end of each day, but continues to experience intermittent knee pain at random  Not a recurrent problem   Pain  Current pain ratin  At best pain ratin  At worst pain ratin  Location: R knee  Quality: discomfort ("Stiffness", "tightness")  Relieving factors: ice, medications, relaxation and rest  Aggravating factors: running, stair climbing and walking    Social Support  Steps to enter house: yes  Stairs in house: yes   Lives in: multiple-level home  Lives with: spouse and young children      Diagnostic Tests  X-ray: normal  Patient Goals  Patient goals for therapy: increased motion, decreased pain, decreased edema and return to sport/leisure activities  Patient goal: return to running for exercise        Objective     Static Posture     Ankle/Foot   Ankle/Foot (Left): Pes planus  Ankle/Foot (Right): Pes planus  Joint Play     Additional Joint Play Details  R patella demonstrated hypomobility with medial glides       Strength/Myotome Testing     Left Hip   Planes of Motion   Flexion: 5  Extension: 5  Abduction: 5    Right Hip   Planes of Motion   Flexion: 5  Extension: 5  Abduction: 5    Left Knee   Flexion: 5  Extension: 5    Right Knee   Flexion: 4+  Extension: 5    Left Ankle/Foot   Dorsiflexion: 5  Plantar flexion: 5  Inversion: 5  Eversion: 5    Right Ankle/Foot   Dorsiflexion: 5  Plantar flexion: 5  Inversion: 5  Eversion: 5    Tests     Left Hip   Negative modified Jarocho  Right Hip   Positive Ely's  Swelling     Left Knee Girth Measurement (cm)   Joint line: 34 cm    Right Knee Girth Measurement (cm)   Joint line: 36 cm    Functional Assessment   Squat   Pain, left tibial anterior translation beyond toes, sitting toward right side and right tibial anterior translation beyond toes  Not sitting toward left side             Precautions: Standard  Daily Treatment Day  Manual  12/10            R patellar medial and inferior glides 12'            k-tape R lateral Patella            Leukotape  B feet                                                        Exercise Diary  12/10 12/12          Treadmill 5' 5'          Step downs  8"  3#  x20          lateral dips   6"  x20 each          Mini Squats with slope on Bosu with BTB            SLS on pad with ball toss  Blue 2#  2 x10 each          W Walks with TB  OTB  4 laps                      ITB stretch with strap in supsine 3 x30"           Prone quad stretch with strap  (vastus latealis)  3 x10" 3 x30" each          Bridges w/ fitness [place] lite            S/L Hip Abduction   2#  2 x 15 each              Modalities              CP                                           HEP: Evan Stretch, kneeling hip flexor stretch, gastroc and soleus stretch, bird dogs, bridges with marches, SLR into hip abd, flexion, and extension

## 2018-12-19 ENCOUNTER — APPOINTMENT (OUTPATIENT)
Dept: PHYSICAL THERAPY | Facility: CLINIC | Age: 38
End: 2018-12-19
Payer: COMMERCIAL

## 2018-12-27 PROCEDURE — G8978 MOBILITY CURRENT STATUS: HCPCS | Performed by: PHYSICAL THERAPIST

## 2018-12-27 PROCEDURE — G8979 MOBILITY GOAL STATUS: HCPCS | Performed by: PHYSICAL THERAPIST

## 2019-11-25 RX ORDER — VITAMIN B COMPLEX
2000 TABLET ORAL DAILY
COMMUNITY
End: 2020-11-10 | Stop reason: HOSPADM

## 2019-11-25 RX ORDER — PNV NO.95/FERROUS FUM/FOLIC AC 28MG-0.8MG
325 TABLET ORAL
COMMUNITY
End: 2020-12-21

## 2019-11-25 RX ORDER — LABETALOL 100 MG/1
TABLET, FILM COATED ORAL
COMMUNITY
Start: 2019-05-20 | End: 2020-08-18 | Stop reason: SDUPTHER

## 2019-11-25 RX ORDER — LORATADINE 10 MG/1
10 TABLET ORAL DAILY
COMMUNITY

## 2019-11-25 RX ORDER — LEVOTHYROXINE SODIUM 0.07 MG/1
TABLET ORAL
COMMUNITY
Start: 2019-07-18 | End: 2019-12-18 | Stop reason: SDUPTHER

## 2019-11-25 RX ORDER — OXYMETAZOLINE HYDROCHLORIDE 0.05 G/100ML
SPRAY NASAL DAILY
COMMUNITY
End: 2021-11-29

## 2019-11-27 ENCOUNTER — OFFICE VISIT (OUTPATIENT)
Dept: FAMILY MEDICINE CLINIC | Facility: HOSPITAL | Age: 39
End: 2019-11-27
Payer: COMMERCIAL

## 2019-11-27 VITALS
DIASTOLIC BLOOD PRESSURE: 82 MMHG | WEIGHT: 162.4 LBS | SYSTOLIC BLOOD PRESSURE: 122 MMHG | TEMPERATURE: 98.1 F | HEIGHT: 62 IN | BODY MASS INDEX: 29.88 KG/M2 | HEART RATE: 78 BPM

## 2019-11-27 DIAGNOSIS — G89.29 CHRONIC PAIN OF BOTH KNEES: ICD-10-CM

## 2019-11-27 DIAGNOSIS — Z23 ENCOUNTER FOR IMMUNIZATION: ICD-10-CM

## 2019-11-27 DIAGNOSIS — M25.562 CHRONIC PAIN OF BOTH KNEES: ICD-10-CM

## 2019-11-27 DIAGNOSIS — E03.9 HYPOTHYROIDISM, UNSPECIFIED TYPE: ICD-10-CM

## 2019-11-27 DIAGNOSIS — R53.83 FATIGUE, UNSPECIFIED TYPE: ICD-10-CM

## 2019-11-27 DIAGNOSIS — D50.0 IRON DEFICIENCY ANEMIA DUE TO CHRONIC BLOOD LOSS: ICD-10-CM

## 2019-11-27 DIAGNOSIS — Z13.1 SCREENING FOR DIABETES MELLITUS (DM): ICD-10-CM

## 2019-11-27 DIAGNOSIS — I10 BENIGN ESSENTIAL HTN: Primary | ICD-10-CM

## 2019-11-27 DIAGNOSIS — N92.4 EXCESSIVE BLEEDING IN PREMENOPAUSAL PERIOD: ICD-10-CM

## 2019-11-27 DIAGNOSIS — M25.561 CHRONIC PAIN OF BOTH KNEES: ICD-10-CM

## 2019-11-27 PROBLEM — N93.9 ABNORMAL UTERINE BLEEDING: Status: ACTIVE | Noted: 2019-08-05

## 2019-11-27 PROBLEM — N83.201 OVARIAN CYST, RIGHT: Status: ACTIVE | Noted: 2019-08-05

## 2019-11-27 PROCEDURE — 90686 IIV4 VACC NO PRSV 0.5 ML IM: CPT | Performed by: FAMILY MEDICINE

## 2019-11-27 PROCEDURE — 99203 OFFICE O/P NEW LOW 30 MIN: CPT | Performed by: FAMILY MEDICINE

## 2019-11-27 PROCEDURE — 90471 IMMUNIZATION ADMIN: CPT | Performed by: FAMILY MEDICINE

## 2019-11-28 NOTE — PROGRESS NOTES
Assessment/Plan:      Problem List Items Addressed This Visit     None      Visit Diagnoses     Benign essential HTN    -  Primary    Relevant Medications    labetalol (NORMODYNE) 100 mg tablet    Other Relevant Orders    CBC    Comprehensive metabolic panel    Lipid Panel with Direct LDL reflex    Magnesium    TSH, 3rd generation with Free T4 reflex    Encounter for immunization        Relevant Orders    influenza vaccine, [id number], quadrivalent, 0 5 mL, preservative-free, for adult and pediatric patients 6 mos+ (AFLURIA, FLUARIX, FLULAVAL, FLUZONE) (Completed)    Hypothyroidism, unspecified type        Relevant Medications    labetalol (NORMODYNE) 100 mg tablet    levothyroxine 75 mcg tablet    Other Relevant Orders    TSH, 3rd generation with Free T4 reflex    Iron deficiency anemia due to chronic blood loss        Relevant Medications    Ferrous Sulfate (IRON) 325 (65 Fe) MG TABS    Other Relevant Orders    Iron    Ferritin    Iron Saturation %    Vitamin B12    Excessive bleeding in premenopausal period        Chronic pain of both knees        Screening for diabetes mellitus (DM)        Relevant Orders    Hemoglobin A1C    Fatigue, unspecified type        Relevant Orders    Lyme Antibody Profile with reflex to WB       1  Hypertension  Continues on labetalol  Continue with the same for now  Discussed beta-blocker may be potentially a cause of fatigue  2  Menorrhagia  Will follow up with Gynecology  3  Hypothyroidism  Continue on levothyroxine 75 mcg for now  Check labs  4  Patient with iron deficiency anemia due to menorrhagia  Check CBC and iron levels  Continue with iron supplementation  Will follow up with Gynecology  5  Fatigue  Check other labs  May be due to hypothyroidism, anemia, increased stressors  6  Screening labs to be done  7  Chronic knee pain  Consistent with patellofemoral syndrome bilaterally    Has been to physical therapy in the past   Discussed going back to physical therapy or seeing orthopedic surgery  She will monitor for now  Continue exercising at home  Follow up in 6 months or as needed  Subjective:   Chief Complaint   Patient presents with   [facility] Establish Care    Knee Pain    Thyroid Problem        Patient ID: Tamela Tay is a 35 y o  female  Patient is here to establish care  She has a history of hypothyroidism  Reports he just had and ovarian tumor removed  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  Wondering if this has anything to do with altering her hormone levels  She has not had her TSH checked in over a year  She continues to take Synthroid at 75 mcg a day  Patient with menorrhagia  Upon seeing gynecology and ovarian cyst was found  She was then referred for removal   She had the oophorectomy  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  Records were reviewed  She has yet to follow up with gynecology regarding the menorrhagia  Discussion about endometrial ablation was done  She reports she had bleeding through breathing with IUDs  Patient with bilateral knee pain  Has had patellofemoral syndrome in the past   Did go to physical therapy in the past   Had in some improvement and the pain went away  Now she has not been exercising as much as she is going back to work full time  Her pain started to the come back  No sharp pain  Pain is mild-to-moderate  No recent injury  No history of surgical intervention  No leg swelling  No erythema  Knee Pain      Thyroid Problem   Symptoms include fatigue and menstrual problem  Patient reports no constipation, diaphoresis, diarrhea or palpitations  The following portions of the patient's history were reviewed and updated as appropriate: allergies, current medications, past family history, past medical history, past social history, past surgical history and problem list     Review of Systems   Constitutional: Positive for fatigue   Negative for activity change, appetite change, chills, diaphoresis and fever  HENT: Negative for congestion, facial swelling and sore throat  Respiratory: Negative  Negative for apnea, cough, chest tightness and shortness of breath  Cardiovascular: Negative  Negative for chest pain and palpitations  Gastrointestinal: Negative  Negative for abdominal distention, abdominal pain, blood in stool, constipation, diarrhea and nausea  Genitourinary: Positive for menstrual problem  Negative for difficulty urinating, dysuria, flank pain and frequency  Objective:  Vitals:    11/27/19 1830   BP: 122/82   Pulse: 78   Temp: 98 1 °F (36 7 °C)   Weight: 73 7 kg (162 lb 6 4 oz)   Height: 5' 2" (1 575 m)     BP Readings from Last 3 Encounters:   11/27/19 122/82      Wt Readings from Last 3 Encounters:   11/27/19 73 7 kg (162 lb 6 4 oz)        No visits with results within 6 Month(s) from this visit  Latest known visit with results is:   No results found for any previous visit    ]       Physical Exam   Constitutional: She is oriented to person, place, and time  She appears well-developed and well-nourished  HENT:   Head: Normocephalic and atraumatic  Right Ear: External ear normal    Left Ear: External ear normal    Nose: Nose normal    Mouth/Throat: Oropharynx is clear and moist    Eyes: Pupils are equal, round, and reactive to light  Conjunctivae and EOM are normal    Neck: Normal range of motion  Neck supple  No tracheal deviation present  No thyromegaly present  Cardiovascular: Normal rate, regular rhythm and normal heart sounds  No murmur heard  Pulmonary/Chest: Effort normal and breath sounds normal  No respiratory distress  She has no wheezes  Abdominal: Soft  Bowel sounds are normal    Musculoskeletal: Normal range of motion  Right knee: Normal  She exhibits normal range of motion, no swelling, no effusion, normal patellar mobility, no bony tenderness, normal meniscus and no MCL laxity  No tenderness found   No medial joint line, no lateral joint line, no MCL, no LCL and no patellar tendon tenderness noted  Left knee: Normal  She exhibits normal range of motion, no swelling, normal meniscus and no MCL laxity  No tenderness found  No medial joint line, no lateral joint line and no MCL tenderness noted  Neurological: She is oriented to person, place, and time  Skin: Skin is warm and dry

## 2019-12-02 ENCOUNTER — VBI (OUTPATIENT)
Dept: FAMILY MEDICINE CLINIC | Facility: HOSPITAL | Age: 39
End: 2019-12-02

## 2019-12-02 NOTE — LETTER
Procedure Request Form: Mammogram      Date Requested: 19  Patient: [place] Needs  Patient : 1980   Referring Provider: Carina Mackey, MD        Date of Procedure ______________________________       The above patient has informed us that they have completed their   most recent Mammogram at your facility  Please complete   this form and attach all corresponding procedure reports/results  Comments __________________________________________________________  ____________________________________________________________________  ____________________________________________________________________  ____________________________________________________________________    Facility Completing Procedure _________________________________________    Form Completed By (print name) _______________________________________      Signature __________________________________________________________      These reports are needed for  compliance    Please fax this completed form and a copy of the procedure report to our office as soon as possible to [phone] carla Smith: Phone [phone]    We thank you for your assistance in treating our mutual patient     (sent to [facility])

## 2019-12-02 NOTE — LETTER
Procedure Request Form: Cervical Cancer Screening      Date Requested: 19  Patient: Candida Glynn  Patient : 1980   Referring Provider: Mckenna Marcum, MD        Date of Procedure ______________________________       The above patient has informed us that they have completed their   most recent Cervical Cancer Screening at your facility  Please complete   this form and attach all corresponding procedure reports/results  Comments __________________________________________________________  ____________________________________________________________________  ____________________________________________________________________  ____________________________________________________________________    Facility Completing Procedure _________________________________________    Form Completed By (print name) _______________________________________      Signature __________________________________________________________      These reports are needed for  compliance    Please fax this completed form and a copy of the procedure report to our office as soon as possible to [phone] carla Smith: Phone [phone]    We thank you for your assistance in treating our mutual patient     (sent to [facility])

## 2019-12-02 NOTE — LETTER
Procedure Request Form: Mammogram      Date Requested: 19  Patient: Edilberto Pali  Patient : 1980   Referring Provider: Rajesh Ingram, MD        Date of Procedure ______________________________       The above patient has informed us that they have completed their   most recent Mammogram at your facility  Please complete   this form and attach all corresponding procedure reports/results  Comments __________________________________________________________  ____________________________________________________________________  ____________________________________________________________________  ____________________________________________________________________    Facility Completing Procedure _________________________________________    Form Completed By (print name) _______________________________________      Signature __________________________________________________________      These reports are needed for  compliance    Please fax this completed form and a copy of the procedure report to our office as soon as possible to [phone] carla Smith: Phone [phone]    We thank you for your assistance in treating our mutual patient     (sent to [facility])

## 2019-12-02 NOTE — LETTER
Procedure Request Form: Cervical Cancer Screening      Date Requested: 19  Patient: Iesha Lakeside  Patient : 1980   Referring Provider: Ara García, MD        Date of Procedure ______________________________       The above patient has informed us that they have completed their   most recent Cervical Cancer Screening at your facility  Please complete   this form and attach all corresponding procedure reports/results  Comments __________________________________________________________  ____________________________________________________________________  ____________________________________________________________________  ____________________________________________________________________    Facility Completing Procedure _________________________________________    Form Completed By (print name) _______________________________________      Signature __________________________________________________________      These reports are needed for  compliance    Please fax this completed form and a copy of the procedure report to our office as soon as possible to [phone] carla Smith: Phone [phone]    We thank you for your assistance in treating our mutual patient     (sent to [facility])

## 2019-12-02 NOTE — TELEPHONE ENCOUNTER
[address] [facility]   Phone Number: [phone]             I have reviewed both our old [address] and need your assistance in locating Breast Cancer Screening and Cervical Cancer Screening  Per Patient, testing was done by Arben Carrion and [facility] within past year  Pt also may have had tdap done at [facility] if we could get that record as well? Thank you  Faxed to [facility] [phone] Bull farr MA 12/02/19 10:18 AM     12/02/2019 12:57 PM Phone (Incoming) Nery Read at [facility] (Provider) [phone] Remove   [facility] record is 10/23/2015 pelvic only no pap performed but not from this provider; pt was seen in the network only- they cannot release records they dont own/ contatct [facility]          By Bull farr MA          Faxed to [facility] [phone] Sarah Lott MA 12/02/19 1:01 PM    Resulted Mammogram Sarah Lott MA 12/05/19 8:23 AM     Resulted pap Sarah Lott MA 12/05/19 8:37 AM

## 2019-12-17 LAB
ALBUMIN SERPL-MCNC: 4.5 G/DL (ref 3.5–5.5)
ALBUMIN/GLOB SERPL: 2 {RATIO} (ref 1.2–2.2)
ALP SERPL-CCNC: 75 IU/L (ref 39–117)
ALT SERPL-CCNC: 10 IU/L (ref 0–32)
AST SERPL-CCNC: 12 IU/L (ref 0–40)
B BURGDOR IGG+IGM SER-ACNC: <0.91 ISR (ref 0–0.9)
BILIRUB SERPL-MCNC: 0.5 MG/DL (ref 0–1.2)
BUN SERPL-MCNC: 8 MG/DL (ref 6–24)
BUN/CREAT SERPL: 10 (ref 9–23)
CALCIUM SERPL-MCNC: 9.6 MG/DL (ref 8.7–10.2)
CHLORIDE SERPL-SCNC: 107 MMOL/L (ref 96–106)
CHOLEST SERPL-MCNC: 188 MG/DL (ref 100–199)
CO2 SERPL-SCNC: 21 MMOL/L (ref 20–29)
CREAT SERPL-MCNC: 0.77 MG/DL (ref 0.57–1)
ERYTHROCYTE [DISTWIDTH] IN BLOOD BY AUTOMATED COUNT: 13.3 % (ref 12.3–15.4)
EST. AVERAGE GLUCOSE BLD GHB EST-MCNC: 97 MG/DL
FERRITIN SERPL-MCNC: 16 NG/ML (ref 15–150)
GLOBULIN SER-MCNC: 2.3 G/DL (ref 1.5–4.5)
GLUCOSE SERPL-MCNC: 88 MG/DL (ref 65–99)
HBA1C MFR BLD: 5 % (ref 4.8–5.6)
HCT VFR BLD AUTO: 41.9 % (ref 34–46.6)
HDLC SERPL-MCNC: 47 MG/DL
HGB BLD-MCNC: 14 G/DL (ref 11.1–15.9)
IRON SERPL-MCNC: 101 UG/DL (ref 27–159)
LDLC SERPL CALC-MCNC: 123 MG/DL (ref 0–99)
LDLC/HDLC SERPL: 2.6 RATIO (ref 0–3.2)
MAGNESIUM SERPL-MCNC: 2.1 MG/DL (ref 1.6–2.3)
MCH RBC QN AUTO: 30.9 PG (ref 26.6–33)
MCHC RBC AUTO-ENTMCNC: 33.4 G/DL (ref 31.5–35.7)
MCV RBC AUTO: 93 FL (ref 79–97)
PLATELET # BLD AUTO: 279 X10E3/UL (ref 150–450)
POTASSIUM SERPL-SCNC: 4.2 MMOL/L (ref 3.5–5.2)
PROT SERPL-MCNC: 6.8 G/DL (ref 6–8.5)
RBC # BLD AUTO: 4.53 X10E6/UL (ref 3.77–5.28)
SL AMB EGFR AFRICAN AMERICAN: 112 ML/MIN/1.73
SL AMB EGFR NON AFRICAN AMERICAN: 97 ML/MIN/1.73
SL AMB T4, FREE (DIRECT): 1.11 NG/DL (ref 0.82–1.77)
SL AMB VLDL CHOLESTEROL CALC: 18 MG/DL (ref 5–40)
SODIUM SERPL-SCNC: 140 MMOL/L (ref 134–144)
TRIGL SERPL-MCNC: 90 MG/DL (ref 0–149)
TSH SERPL DL<=0.005 MIU/L-ACNC: 8.2 UIU/ML (ref 0.45–4.5)
VIT B12 SERPL-MCNC: 555 PG/ML (ref 232–1245)
WBC # BLD AUTO: 6 X10E3/UL (ref 3.4–10.8)

## 2019-12-17 PROCEDURE — 3044F HG A1C LEVEL LT 7.0%: CPT | Performed by: FAMILY MEDICINE

## 2019-12-18 DIAGNOSIS — E03.9 HYPOTHYROIDISM, UNSPECIFIED TYPE: Primary | ICD-10-CM

## 2019-12-18 DIAGNOSIS — E03.9 HYPOTHYROIDISM, UNSPECIFIED TYPE: ICD-10-CM

## 2019-12-18 RX ORDER — LEVOTHYROXINE SODIUM 112 UG/1
112 TABLET ORAL DAILY
Qty: 60 TABLET | Refills: 1
Start: 2019-12-18 | End: 2019-12-18 | Stop reason: SDUPTHER

## 2019-12-18 RX ORDER — LEVOTHYROXINE SODIUM 112 UG/1
112 TABLET ORAL DAILY
Qty: 60 TABLET | Refills: 1 | Status: SHIPPED | OUTPATIENT
Start: 2019-12-18 | End: 2020-02-19 | Stop reason: SDUPTHER

## 2020-02-07 LAB — TSH SERPL DL<=0.005 MIU/L-ACNC: 0.36 UIU/ML (ref 0.45–4.5)

## 2020-02-19 DIAGNOSIS — E03.9 HYPOTHYROIDISM, UNSPECIFIED TYPE: ICD-10-CM

## 2020-02-19 DIAGNOSIS — E03.9 HYPOTHYROIDISM, UNSPECIFIED TYPE: Primary | ICD-10-CM

## 2020-02-19 RX ORDER — LEVOTHYROXINE SODIUM 88 UG/1
88 TABLET ORAL DAILY
Qty: 60 TABLET | Refills: 1 | Status: SHIPPED | OUTPATIENT
Start: 2020-02-19 | End: 2020-06-13

## 2020-06-03 LAB — TSH SERPL DL<=0.005 MIU/L-ACNC: 0.72 UIU/ML (ref 0.45–4.5)

## 2020-06-08 ENCOUNTER — OFFICE VISIT (OUTPATIENT)
Dept: FAMILY MEDICINE CLINIC | Facility: HOSPITAL | Age: 40
End: 2020-06-08
Payer: COMMERCIAL

## 2020-06-08 VITALS
WEIGHT: 162.6 LBS | SYSTOLIC BLOOD PRESSURE: 122 MMHG | HEART RATE: 80 BPM | TEMPERATURE: 99.1 F | BODY MASS INDEX: 29.92 KG/M2 | DIASTOLIC BLOOD PRESSURE: 88 MMHG | HEIGHT: 62 IN

## 2020-06-08 DIAGNOSIS — Z12.31 ENCOUNTER FOR SCREENING MAMMOGRAM FOR MALIGNANT NEOPLASM OF BREAST: ICD-10-CM

## 2020-06-08 DIAGNOSIS — R29.898 KNEE CLICKING: Primary | ICD-10-CM

## 2020-06-08 PROCEDURE — 3008F BODY MASS INDEX DOCD: CPT | Performed by: NURSE PRACTITIONER

## 2020-06-08 PROCEDURE — 3079F DIAST BP 80-89 MM HG: CPT | Performed by: NURSE PRACTITIONER

## 2020-06-08 PROCEDURE — 99213 OFFICE O/P EST LOW 20 MIN: CPT | Performed by: NURSE PRACTITIONER

## 2020-06-08 PROCEDURE — 1036F TOBACCO NON-USER: CPT | Performed by: NURSE PRACTITIONER

## 2020-06-08 PROCEDURE — 3074F SYST BP LT 130 MM HG: CPT | Performed by: NURSE PRACTITIONER

## 2020-06-12 ENCOUNTER — APPOINTMENT (OUTPATIENT)
Dept: RADIOLOGY | Facility: CLINIC | Age: 40
End: 2020-06-12
Payer: COMMERCIAL

## 2020-06-12 ENCOUNTER — OFFICE VISIT (OUTPATIENT)
Dept: OBGYN CLINIC | Facility: CLINIC | Age: 40
End: 2020-06-12
Payer: COMMERCIAL

## 2020-06-12 VITALS
WEIGHT: 162 LBS | SYSTOLIC BLOOD PRESSURE: 119 MMHG | HEIGHT: 65 IN | DIASTOLIC BLOOD PRESSURE: 60 MMHG | BODY MASS INDEX: 26.99 KG/M2

## 2020-06-12 DIAGNOSIS — M25.561 RIGHT KNEE PAIN, UNSPECIFIED CHRONICITY: ICD-10-CM

## 2020-06-12 DIAGNOSIS — R29.898 KNEE CLICKING: ICD-10-CM

## 2020-06-12 DIAGNOSIS — M23.91 INTERNAL DERANGEMENT OF KNEE JOINT, RIGHT: Primary | ICD-10-CM

## 2020-06-12 PROCEDURE — 73564 X-RAY EXAM KNEE 4 OR MORE: CPT

## 2020-06-12 PROCEDURE — 99243 OFF/OP CNSLTJ NEW/EST LOW 30: CPT | Performed by: ORTHOPAEDIC SURGERY

## 2020-06-13 DIAGNOSIS — E03.9 HYPOTHYROIDISM, UNSPECIFIED TYPE: ICD-10-CM

## 2020-06-13 RX ORDER — LEVOTHYROXINE SODIUM 88 UG/1
TABLET ORAL
Qty: 30 TABLET | Refills: 0 | Status: SHIPPED | OUTPATIENT
Start: 2020-06-13 | End: 2020-07-13

## 2020-06-19 ENCOUNTER — HOSPITAL ENCOUNTER (OUTPATIENT)
Dept: MRI IMAGING | Facility: HOSPITAL | Age: 40
Discharge: HOME | End: 2020-06-19
Payer: COMMERCIAL

## 2020-06-19 DIAGNOSIS — M23.91 INTERNAL DERANGEMENT OF KNEE JOINT, RIGHT: ICD-10-CM

## 2020-06-19 PROCEDURE — 73721 MRI JNT OF LWR EXTRE W/O DYE: CPT

## 2020-06-26 ENCOUNTER — OFFICE VISIT (OUTPATIENT)
Dept: OBGYN CLINIC | Facility: CLINIC | Age: 40
End: 2020-06-26
Payer: COMMERCIAL

## 2020-06-26 VITALS — BODY MASS INDEX: 26.99 KG/M2 | HEIGHT: 65 IN | WEIGHT: 162 LBS

## 2020-06-26 DIAGNOSIS — M76.31 IT BAND SYNDROME, RIGHT: Primary | ICD-10-CM

## 2020-06-26 DIAGNOSIS — M17.11 PRIMARY OSTEOARTHRITIS OF RIGHT KNEE: ICD-10-CM

## 2020-06-26 PROCEDURE — 3078F DIAST BP <80 MM HG: CPT | Performed by: ORTHOPAEDIC SURGERY

## 2020-06-26 PROCEDURE — 99213 OFFICE O/P EST LOW 20 MIN: CPT | Performed by: ORTHOPAEDIC SURGERY

## 2020-06-26 PROCEDURE — 1036F TOBACCO NON-USER: CPT | Performed by: ORTHOPAEDIC SURGERY

## 2020-06-26 PROCEDURE — 3074F SYST BP LT 130 MM HG: CPT | Performed by: ORTHOPAEDIC SURGERY

## 2020-06-26 PROCEDURE — 3008F BODY MASS INDEX DOCD: CPT | Performed by: ORTHOPAEDIC SURGERY

## 2020-07-01 ENCOUNTER — EVALUATION (OUTPATIENT)
Dept: PHYSICAL THERAPY | Facility: CLINIC | Age: 40
End: 2020-07-01
Payer: COMMERCIAL

## 2020-07-01 DIAGNOSIS — M76.31 IT BAND SYNDROME, RIGHT: ICD-10-CM

## 2020-07-01 DIAGNOSIS — M17.11 PRIMARY OSTEOARTHRITIS OF RIGHT KNEE: ICD-10-CM

## 2020-07-01 PROCEDURE — 97110 THERAPEUTIC EXERCISES: CPT | Performed by: PHYSICAL THERAPIST

## 2020-07-01 PROCEDURE — 97162 PT EVAL MOD COMPLEX 30 MIN: CPT | Performed by: PHYSICAL THERAPIST

## 2020-07-01 NOTE — PROGRESS NOTES
PT Evaluation     Today's date: 2020  Patient name: Joellen Ferro  : 1980  MRN: [id number]  Referring provider: Kahlil Lau DO  Dx:   Encounter Diagnosis     ICD-10-CM    1  It band syndrome, right M76 31 Ambulatory referral to Physical Therapy   2  Primary osteoarthritis of right knee M17 11 Ambulatory referral to Physical Therapy                  Assessment  Assessment details: Joellen Ferro is a 38 y o  female presenting to outpatient physical therapy with chief complaints of (R) anterior knee clicking  Patient describes insidious onset of (R) anterior knee clicking  Patient displays with abnormal gait, normal (R) knee ROM with no pain with end range OP, (-) ligamentous instability, (+) modified Evan test, (+) Jarocho test, significant (B) hip strength deficit, and significant functional weakness  Patient's symptoms are multifactorial in nature with a primary movement diagnosis of poor motor control resulting in pathoanatomical signs and symptoms consistent with ITB syndrome and (R) knee OA resulting in limitations in her ability to perform higher level exercises without clicking  No further referral appears necessary at this time based upon examination results  Please contact me if you have any questions  Thank you for the opportunity to share in the care of this patient  Impairments: abnormal gait, abnormal muscle tone, abnormal or restricted ROM, abnormal movement, activity intolerance, impaired balance, impaired physical strength, lacks appropriate home exercise program, pain with function and poor body mechanics  Understanding of Dx/Px/POC: good   Prognosis: good  Prognosis details: Positive prognostic indicators include: positive attitude toward recovery, motivated to improve  Negative prognostic indicators include persistent clicking  Goals  STG to be met in 3 weeks (20):  - Increase (B) Hip strength by 1/2 MMT grade  - I with HEP    LTG to be met in 6 weeks (20):  - Increase (B) Hip strength to 4+/5 all planes  - I with updated HEP   - Patient will be able to return to regular gym-based exercise without increased pain or clicking  Plan  Plan details: Prognosis above is given PT services 2x/week tapering to 1x/week over the next 6 weeks and home program adherence  Patient would benefit from: skilled physical therapy  Planned modality interventions: cryotherapy and thermotherapy: hydrocollator packs  Planned therapy interventions: joint mobilization, manual therapy, neuromuscular re-education, patient education, strengthening, stretching, therapeutic activities, therapeutic exercise, home exercise program, body mechanics training, activity modification, functional ROM exercises, gait training and balance  Plan of Care beginning date: 2020  Plan of Care expiration date: 2020  Treatment plan discussed with: patient and PTA        Subjective Evaluation    History of Present Illness  Mechanism of injury: Patient reports having knee pain since 2018 - no clear AGUSTÍN - she saw her PCP - referred to PT - no significant improvement  She reports a few months ago she started to notice clicking in the anterior knee  She saw Dr David Carroll - x-ray and MRI were ordered and PT was recommended        Red Flag Screen:  Patient denies recent fever, changes in bowel and bladder function, nausea and vomiting, weakness, unexplained weight loss, tingling, numbness, pain with coughing, or traumatic AGUSTÍN       Greatest concern: clicking is annoying   Quality of life: good    Pain  Current pain ratin  At best pain ratin  At worst pain rating: 3  Location: (R) Knee  - ITB region   Quality: dull ache    Social Support  Steps to enter house: yes  Stairs in house: yes   Lives in: multiple-level home    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)    Diagnostic Tests  X-ray: abnormal  MRI studies: abnormal  Patient Goals  Patient goal: Functioning without knee clicking; regular exercise program - squats, lunges, running        Objective     Static Posture   General Observations  Symmetrical weight bearing  Palpation     Additional Palpation Details  No TTP throughout (R) knee    Active Range of Motion     Right Knee   Flexion: WFL  Extension: [facility]    Additional Active Range of Motion Details  No pain with (R) knee flex/ ext OP    Mobility   Patellar Mobility:     Right Knee   WFL: medial, lateral, superior and inferior    Strength/Myotome Testing     Left Hip   Planes of Motion   Extension: 4-  Abduction: 4-    Isolated Muscles   Gluteus madisyn: 3+    Right Hip   Planes of Motion   Extension: 4-  Abduction: 4-    Isolated Muscles   Gluteus maximums: 3+    Left Knee   Flexion: 5  Extension: 5    Right Knee   Flexion: 5  Extension: 5    Tests     Right Hip   Positive Jarocho  Left Knee   Negative anterior drawer and posterior drawer  Right Knee   Negative anterior drawer, posterior drawer, valgus stress test at 0 degrees, valgus stress test at 30 degrees, varus stress test at 0 degrees and varus stress test at 30 degrees  Additional Tests Details  (B) Modified Evan Test (+) for hip flexor and rectus femoris tightness, (R) (+) for ITB tightness     Ambulation     Observational Gait   Gait: antalgic   Decreased walking speed, right stance time, left step length and right step length     Left foot contact pattern: foot flat  Right foot contact pattern: foot flat  Left arm swing: decreased  Right arm swing: decreased  Base of support: increased    Additional Observational Gait Details  (R) foot toe out    Functional Assessment        Comments  Squat: limited depth - hip dominant - (R) clicking  FWD Lunge: poor balance and control - limited depth  SL Balance: EO - (B) > 10 sec - ankle strategy  LAT Step Down: significant dynamic valgus (B) - no increase in pain        Daily Treatment Diary     DX: (R) ITB Syndrome, (R) Knee OA  EPOC: 8/12/20  Precautions: standard  CO-MORBIDITIES: HTN    Stage: subacute   Stability of Symptoms: unchanging   Symptom Irritability Level: moderate  Primary Movement Diagnosis: (R) ITB Syndrome, (R) Knee OA  Goal(s):   STG to be met in 3 weeks (7/22/20):  - Increase (B) Hip strength by 1/2 MMT grade  - I with HEP  LTG to be met in 6 weeks (8/12/20):  - Increase (B) Hip strength to 4+/5 all planes  - I with updated HEP   - Patient will be able to return to regular gym-based exercise without increased pain or clicking     Greatest Concern: clicking is annoying  Current Activity Recommendations: added HEP (7/1)  Current Educational Needs: progressions, realistic expectations      Manual          (R) ITB Stretch         (R) ITB IASTM                                        Exercise Diary  HEP         Therapeutic Exercise           Recumbent Bike                    SLR 7/1         S/L Hip ABD 7/1         PHE 7/1         Prone Glute Raise 7/1         SL Bridge                    Standing ITB Stretch 7/1                             Neuromuscular Reeducation          TB Counter Balance                    FWD Mini Lunge          LAT Mini Lunge          Mini Squat                    LAT Heel Tap                    FWD Step Up          LAT Step Up                    Therapeutic Activity                              Gait Training                        Modalities

## 2020-07-07 ENCOUNTER — OFFICE VISIT (OUTPATIENT)
Dept: PHYSICAL THERAPY | Facility: CLINIC | Age: 40
End: 2020-07-07
Payer: COMMERCIAL

## 2020-07-07 DIAGNOSIS — M76.31 IT BAND SYNDROME, RIGHT: Primary | ICD-10-CM

## 2020-07-07 DIAGNOSIS — M17.11 PRIMARY OSTEOARTHRITIS OF RIGHT KNEE: ICD-10-CM

## 2020-07-07 PROCEDURE — 97110 THERAPEUTIC EXERCISES: CPT | Performed by: PHYSICAL THERAPIST

## 2020-07-07 PROCEDURE — 97140 MANUAL THERAPY 1/> REGIONS: CPT | Performed by: PHYSICAL THERAPIST

## 2020-07-07 PROCEDURE — 97112 NEUROMUSCULAR REEDUCATION: CPT | Performed by: PHYSICAL THERAPIST

## 2020-07-07 NOTE — PROGRESS NOTES
Daily Note     Today's date: 2020  Patient name: Abdirahman Clayton  : 1980  MRN: [id number]  Referring provider: Anneliese Talley DO  Dx:   Encounter Diagnosis     ICD-10-CM    1  It band syndrome, right M76 31    2  Primary osteoarthritis of right knee M17 11                   Subjective: Patient reports good tolerance to her IE  She reports over the past few days her back has bothered her with increased activity  She notes being challenged with exercises - increased soreness lasting 2-3 days  Today she is feeling much better  Objective: See treatment diary below      Assessment:   Stage: subacute   Stability of Symptoms: unchanging   Symptom Irritability Level: moderate  Primary Movement Diagnosis: (R) ITB Syndrome, (R) Knee OA  Goal(s):   STG to be met in 3 weeks (20):  - Increase (B) Hip strength by 1/2 MMT grade  - I with HEP  LTG to be met in 6 weeks (20):  - Increase (B) Hip strength to 4+/5 all planes  - I with updated HEP   - Patient will be able to return to regular gym-based exercise without increased pain or clicking  Greatest Concern: clicking is annoying  Current Activity Recommendations: added HEP ()  Current Educational Needs: progressions, realistic expectations    Patient had good tolerance to manual IASTM - no change in knee clicking but improved glute and TFL flexibility  She demonstrated good form with exercises - no complaints of increased pain  Patient was challenged with TB counter balance  She noted no increased knee pain post treatment  She is limited primarily by LBP at the moment         Plan: Continue with POC - monitor tolerance to initial treatment - assess L/S      Daily Treatment Diary     DX: (R) ITB Syndrome, (R) Knee OA  EPOC: 20  Precautions: standard  CO-MORBIDITIES: HTN        Manual          (R) ITB Stretch         (R) ITB IASTM 8 min                                       Exercise Diary  HEP         Therapeutic Exercise Recumbent Bike  6 min                  SLR 7/1 5"x10 ea        S/L Hip ABD 7/1 5"x10 ea        PHE 7/1 5"x10 ea        Prone Glute Raise 7/1 5"x10 ea        SL Bridge                    Standing ITB Stretch 7/1 30"x3                            Neuromuscular Reeducation          TB Counter Balance  GTB  10x ea                  FWD Mini Lunge          LAT Mini Lunge          Mini Squat                    LAT Heel Tap                    FWD Step Up          LAT Step Up                    Therapeutic Activity                              Gait Training                        Modalities

## 2020-07-10 ENCOUNTER — EVALUATION (OUTPATIENT)
Dept: PHYSICAL THERAPY | Facility: CLINIC | Age: 40
End: 2020-07-10
Payer: COMMERCIAL

## 2020-07-10 DIAGNOSIS — M76.31 IT BAND SYNDROME, RIGHT: Primary | ICD-10-CM

## 2020-07-10 DIAGNOSIS — M17.11 PRIMARY OSTEOARTHRITIS OF RIGHT KNEE: ICD-10-CM

## 2020-07-10 PROCEDURE — 97140 MANUAL THERAPY 1/> REGIONS: CPT | Performed by: PHYSICAL THERAPIST

## 2020-07-10 PROCEDURE — 97110 THERAPEUTIC EXERCISES: CPT | Performed by: PHYSICAL THERAPIST

## 2020-07-10 NOTE — PROGRESS NOTES
Daily Note     Today's date: 7/10/2020  Patient name: Arabella Aden  : 1980  MRN: [id number]  Referring provider: Vivien Bates DO  Dx:   Encounter Diagnosis     ICD-10-CM    1  It band syndrome, right M76 31    2  Primary osteoarthritis of right knee M17 11                   Subjective: Patient reports good tolerance to her LV  She notes her lower back is primarily limiting her at the moment  She reports her knee continues to click with flexion and extension  She reports her (R) lateral thigh continues to ache  Objective: See treatment diary below  7/10/20: Lumbar AROM: Flexion moderate restriction; Extension minimal restriction; (B) SG unrestricted; myotomal screen (B) 5/5; (+) Slump test (R)>(L); (+) SLR (B); Repeated Movement Testing:   Repeated Extension in Standing: Decreased, better - improved lumbar flexion ROM  Repeated Extension in Lying: Decreased, better - improved lumbar flexion ROM      Assessment:   Stage: subacute   Stability of Symptoms: unchanging   Symptom Irritability Level: moderate  Primary Movement Diagnosis: (R) ITB Syndrome, (R) Knee OA  Goal(s):   STG to be met in 3 weeks (20):  - Increase (B) Hip strength by 1/2 MMT grade  - I with HEP  LTG to be met in 6 weeks (20):  - Increase (B) Hip strength to 4+/5 all planes  - I with updated HEP   - Patient will be able to return to regular gym-based exercise without increased pain or clicking  Greatest Concern: clicking is annoying  Current Activity Recommendations: added HEP (); added lumbar extension based HEP (7/10)  Current Educational Needs: progressions, realistic expectations    Patient had good tolerance to IASTM - noted decreased aching with performance today  Lumbar spine was evaluated - displays with signs and symptoms of neural tension - extension bias with mechanical testing - issued HEP  Patient demonstrated good form with exercises performed  She reported no increased pain post treatment  Plan: Continue with POC - monitor tolerance to HEP - monitor lumbar symptoms      Daily Treatment Diary     DX: (R) ITB Syndrome, (R) Knee OA  EPOC: 8/12/20  Precautions: standard  CO-MORBIDITIES: HTN        Manual  7/7 7/10       (R) ITB Stretch         (R) ITB IASTM 8 min 8 min                                      Exercise Diary  HEP 7/7 7/10       Therapeutic Exercise           Recumbent Bike  6 min 6 min                 SLR 7/1 5"x10 ea 5"x10 ea       S/L Hip ABD 7/1 5"x10 ea 5"x10 ea       PHE 7/1 5"x10 ea 5"x10 ea       Prone Glute Raise 7/1 5"x10 ea 5"x10 ea       SL Bridge                    Standing ITB Stretch 7/1 30"x3        Standing Lumbar Ext 7/10  20x       Prone Press Up 7/10  10x       Neuromuscular Reeducation          TB Counter Balance  GTB  10x ea                  FWD Mini Lunge          LAT Mini Lunge          Mini Squat                    LAT Heel Tap                    FWD Step Up          LAT Step Up                    Therapeutic Activity                              Gait Training                        Modalities

## 2020-07-13 ENCOUNTER — TELEPHONE (OUTPATIENT)
Dept: OBGYN CLINIC | Facility: HOSPITAL | Age: 40
End: 2020-07-13

## 2020-07-13 DIAGNOSIS — E03.9 HYPOTHYROIDISM, UNSPECIFIED TYPE: ICD-10-CM

## 2020-07-13 RX ORDER — LEVOTHYROXINE SODIUM 88 UG/1
TABLET ORAL
Qty: 30 TABLET | Refills: 0 | Status: SHIPPED | OUTPATIENT
Start: 2020-07-13 | End: 2020-08-13

## 2020-07-13 NOTE — TELEPHONE ENCOUNTER
The patient should see me back if she does not improve with physical therapy in the office  If she is continuing to have pain she can schedule that visit sooner    Thank you

## 2020-07-13 NOTE — TELEPHONE ENCOUNTER
Fidelina Sterling    [phone]    Dr Yoo Height    Patient left message on voicemail  She is asking for a return phone call in regards to her right knee pain and back pain

## 2020-07-14 ENCOUNTER — APPOINTMENT (OUTPATIENT)
Dept: PHYSICAL THERAPY | Facility: CLINIC | Age: 40
End: 2020-07-14
Payer: COMMERCIAL

## 2020-07-17 ENCOUNTER — OFFICE VISIT (OUTPATIENT)
Dept: OBGYN CLINIC | Facility: CLINIC | Age: 40
End: 2020-07-17
Payer: COMMERCIAL

## 2020-07-17 ENCOUNTER — APPOINTMENT (OUTPATIENT)
Dept: PHYSICAL THERAPY | Facility: CLINIC | Age: 40
End: 2020-07-17
Payer: COMMERCIAL

## 2020-07-17 VITALS
DIASTOLIC BLOOD PRESSURE: 78 MMHG | HEART RATE: 72 BPM | BODY MASS INDEX: 26.99 KG/M2 | SYSTOLIC BLOOD PRESSURE: 122 MMHG | HEIGHT: 65 IN | WEIGHT: 162 LBS

## 2020-07-17 DIAGNOSIS — M76.31 IT BAND SYNDROME, RIGHT: ICD-10-CM

## 2020-07-17 DIAGNOSIS — M17.11 PRIMARY OSTEOARTHRITIS OF RIGHT KNEE: ICD-10-CM

## 2020-07-17 DIAGNOSIS — M54.16 RADICULOPATHY, LUMBAR REGION: Primary | ICD-10-CM

## 2020-07-17 PROCEDURE — 1036F TOBACCO NON-USER: CPT | Performed by: ORTHOPAEDIC SURGERY

## 2020-07-17 PROCEDURE — 3078F DIAST BP <80 MM HG: CPT | Performed by: ORTHOPAEDIC SURGERY

## 2020-07-17 PROCEDURE — 99214 OFFICE O/P EST MOD 30 MIN: CPT | Performed by: ORTHOPAEDIC SURGERY

## 2020-07-17 PROCEDURE — 3008F BODY MASS INDEX DOCD: CPT | Performed by: ORTHOPAEDIC SURGERY

## 2020-07-17 PROCEDURE — 3074F SYST BP LT 130 MM HG: CPT | Performed by: ORTHOPAEDIC SURGERY

## 2020-07-17 RX ORDER — METHYLPREDNISOLONE 4 MG/1
TABLET ORAL
Qty: 1 EACH | Refills: 0 | Status: SHIPPED | OUTPATIENT
Start: 2020-07-17 | End: 2020-08-11

## 2020-07-17 NOTE — PROGRESS NOTES
Ortho Sports Medicine Knee Follow Up Visit     Assesment:   38 y o  female right knee IT band syndrome with mechanical clicking with severe patellofemoral osteoarthritis     Right sided lumbar radiculopathy     Plan:    Conservative treatment:    Ice to knee for 20 minutes at least 1-2 times daily  May take a break from PT at this time - once feeling less pain can start back up   Klaudia Prieto 22 sent to pharmacy   Possible Referral to Dr Marcy Almeida in the future if no improvement    Imaging: All imaging from today was reviewed by myself and explained to the patient  Injection:    No Injection planned at this time  Surgery:     No surgery is recommended at this point, continue with conservative treatment plan as noted  Follow up:    No follow-ups on file  Chief Complaint   Patient presents with    Right Knee - Follow-up     History of Present Illness: The patient is returns for follow up of right knee IT band syndrome with mechanical clicking with severe patellofemoral osteoarthritis  Since the prior visit, She reports minimal improvement  The patient states that the right side of the back has been more painful  She states that she has begun working on some basic stretching and strengthening exercises and physical therapy when she felt a sharp pull in the back  She states that it feels like her back locks up on her  Patient denies any numbness or tingling that extends into the toes  Pain is located lateral      Pain is improved by rest, ice, NSAIDS and physical therapy  Pain is aggravated by stairs, squatting, weight bearing and flexion extension of the back  Symptoms include clicking, catching and locking  The patient has tried rest, ice, NSAIDS and physical therapy          Knee Surgical History:  None    Past Medical, Social and Family History:  Past Medical History:   Diagnosis Date    Allergic 2013    Disease of thyroid gland 2002    Hypertension 2000     Past Surgical History:   Procedure Laterality Date     SECTION  ,     CHOLECYSTECTOMY  2014    OOPHORECTOMY Left 2019     Allergies   Allergen Reactions    Penicillins Rash     Current Outpatient Medications on File Prior to Visit   Medication Sig Dispense Refill    cholecalciferol (VITAMIN D3) 25 mcg (1,000 units) tablet Take 2,000 Units by mouth daily       Ferrous Sulfate (IRON) 325 (65 Fe) MG TABS Take 325 mg by mouth      labetalol (NORMODYNE) 100 mg tablet TAKE 1 TABLET BY MOUTH TWICE DAILY      levothyroxine 88 mcg tablet TAKE 1 TABLET BY MOUTH ONCE DAILY IN THE MORNING ON AN EMPTY STOMACH 30 tablet 0    loratadine (CLARITIN) 10 mg tablet Take 10 mg by mouth daily      Multiple Vitamin (MULTI-VITAMIN DAILY PO) Take 1 tablet by mouth daily      oxymetazoline (AFRIN) 0 05 % nasal spray into each nostril       No current facility-administered medications on file prior to visit        Social History     Socioeconomic History    Marital status: /Civil Union     Spouse name: Not on file    Number of children: Not on file    Years of education: Not on file    Highest education level: Not on file   Occupational History    Not on file   Social Needs    Financial resource strain: Not on file    Food insecurity:     Worry: Not on file     Inability: Not on file    Transportation needs:     Medical: Not on file     Non-medical: Not on file   Tobacco Use    Smoking status: Never Smoker    Smokeless tobacco: Never Used   Substance and Sexual Activity    Alcohol use: Not Currently     Alcohol/week: 1 0 standard drinks     Types: 1 Standard drinks or equivalent per week     Comment: Very Occasionally - not even once a month    Drug use: Never    Sexual activity: Yes     Partners: Male     Birth control/protection: Male Sterilization   Lifestyle    Physical activity:     Days per week: Not on file     Minutes per session: Not on file    Stress: Not on file   Relationships    Social connections:     Talks on phone: Not on file     Gets together: Not on file     Attends Protestant service: Not on file     Active member of club or organization: Not on file     Attends meetings of clubs or organizations: Not on file     Relationship status: Not on file    Intimate partner violence:     Fear of current or ex partner: Not on file     Emotionally abused: Not on file     Physically abused: Not on file     Forced sexual activity: Not on file   Other Topics Concern    Not on file   Social History Narrative    Not on file     I have reviewed the past medical, surgical, social and family history, medications and allergies as documented in the EMR  Review of systems: ROS is negative other than that noted in the HPI  Constitutional: Negative for fatigue and fever  Physical Exam:    Blood pressure 122/78, pulse 72, height 5' 5" (1 651 m), weight 73 5 kg (162 lb)  General/Constitutional: NAD, well developed, well nourished  HENT: Normocephalic, atraumatic  CV: Intact distal pulses, regular rate  Resp: No respiratory distress or labored breathing  Lymphatic: No lymphadenopathy palpated  Neuro: Alert and Oriented x 3, no focal deficits  Psych: Normal mood, normal affect, normal judgement, normal behavior  Skin: Warm, dry, no rashes, no erythema    Knee Exam (focused): RIGHT LEFT   ROM:   0-130 0-130   Palpation: Effusion negative negative     MJL tenderness Negative Negative     LJL tenderness Negative Negative   Meniscus:  Ryder Negative Negative    Apley's Compression Negative Negative   Instability: Varus stable stable     Valgus stable stable   Special Tests: Lachman Negative Negative     Posterior drawer Negative Negative     Anterior drawer Negative Negative     Pivot shift not tested not tested     Dial not tested not tested   Patella: Palpation no tenderness no tenderness     Mobility 1/4 1/4     Apprehension Negative Negative   Other: Single leg 1/4 squat not tested not tested      LE NV Exam: +2 DP/PT pulses bilaterally  Sensation intact to light touch L2-S1 bilaterally    No calf tenderness to palpation bilaterally    Knee Imaging    No imaging was performed today    Scribe Attestation    I,:   Christy Patel am acting as a scribe while in the presence of the attending physician :        I,:   Reagan Found, DO personally performed the services described in this documentation    as scribed in my presence :

## 2020-07-21 ENCOUNTER — OFFICE VISIT (OUTPATIENT)
Dept: PHYSICAL THERAPY | Facility: CLINIC | Age: 40
End: 2020-07-21
Payer: COMMERCIAL

## 2020-07-21 DIAGNOSIS — M17.11 PRIMARY OSTEOARTHRITIS OF RIGHT KNEE: ICD-10-CM

## 2020-07-21 DIAGNOSIS — M76.31 IT BAND SYNDROME, RIGHT: Primary | ICD-10-CM

## 2020-07-21 PROCEDURE — 97110 THERAPEUTIC EXERCISES: CPT | Performed by: PHYSICAL THERAPIST

## 2020-07-21 PROCEDURE — 97112 NEUROMUSCULAR REEDUCATION: CPT | Performed by: PHYSICAL THERAPIST

## 2020-07-21 PROCEDURE — 97140 MANUAL THERAPY 1/> REGIONS: CPT | Performed by: PHYSICAL THERAPIST

## 2020-07-21 NOTE — PROGRESS NOTES
Daily Note     Today's date: 2020  Patient name: Nuha Almaraz  : 1980  MRN: [id number]  Referring provider: Viki Burr DO  Dx:   Encounter Diagnosis     ICD-10-CM    1  It band syndrome, right M76 31    2  Primary osteoarthritis of right knee M17 11                   Subjective: Patient reports performing her HEP as instructed  She reports her lower back was much worse following her LV  She saw Dr Leroy Mayers - received prednisone dose pack - feeling much better  She reports her knee symptoms have not changed significantly  Objective: See treatment diary below    Assessment:   Stage: subacute   Stability of Symptoms: unchanging   Symptom Irritability Level: moderate  Primary Movement Diagnosis: (R) ITB Syndrome, (R) Knee OA  Goal(s):   STG to be met in 3 weeks (20):  - Increase (B) Hip strength by 1/2 MMT grade  - I with HEP  LTG to be met in 6 weeks (20):  - Increase (B) Hip strength to 4+/5 all planes  - I with updated HEP   - Patient will be able to return to regular gym-based exercise without increased pain or clicking  Greatest Concern: clicking is annoying  Current Activity Recommendations: added HEP (); added lumbar extension based HEP (7/10); updated HEP ()  Current Educational Needs: progressions, realistic expectations    Patient had good tolerance to IASTM - reported improved tightness in the lateral thigh following  Lumbar treatment was changed based on symptom response  Prone exercises were held - bridge exercise was substituted  She had good overall tolerance to exercise treatment  Initiated gentle core activation exercises and updated HEP  Patient reported no increase in pain post treatment         Plan: Continue with POC - monitor tolerance to HEP - monitor lumbar symptoms      Daily Treatment Diary     DX: (R) ITB Syndrome, (R) Knee OA  EPOC: 20  Precautions: standard  CO-MORBIDITIES: HTN      Manual  7/7 7/10 7/21      (R) ITB Stretch (R) ITB IASTM 8 min 8 min 8 min                                     Exercise Diary  HEP 7/7 7/10 7/21      Therapeutic Exercise           Recumbent Bike  6 min 6 min 6 min                SLR 7/1 5"x10 ea 5"x10 ea 5"x15 ea      S/L Hip ABD 7/1 5"x10 ea 5"x10 ea 5"x15 ea      PHE 7/1 5"x10 ea 5"x10 ea       Prone Glute Raise 7/1 5"x10 ea 5"x10 ea       Bridge 7/21   5"x15                Standing ITB Stretch 7/1 30"x3        Standing Lumbar Ext 7/10  20x DC      Prone Press Up 7/10  10x DC      Neuromuscular Reeducation          TB Counter Balance  GTB  10x ea  GTB  10x ea                PPT 7/21   5"x10      LFS: Alt LE          LFS: [place] UE/LE                    FWD Mini Lunge          LAT Mini Lunge          Mini Squat                    LAT Heel Tap                    FWD Step Up          LAT Step Up                    Therapeutic Activity                              Gait Training                        Modalities

## 2020-07-22 ENCOUNTER — TELEPHONE (OUTPATIENT)
Dept: PAIN MEDICINE | Facility: CLINIC | Age: 40
End: 2020-07-22

## 2020-07-24 ENCOUNTER — EVALUATION (OUTPATIENT)
Dept: PHYSICAL THERAPY | Facility: CLINIC | Age: 40
End: 2020-07-24
Payer: COMMERCIAL

## 2020-07-24 DIAGNOSIS — M17.11 PRIMARY OSTEOARTHRITIS OF RIGHT KNEE: ICD-10-CM

## 2020-07-24 DIAGNOSIS — M76.31 IT BAND SYNDROME, RIGHT: Primary | ICD-10-CM

## 2020-07-24 PROCEDURE — 97110 THERAPEUTIC EXERCISES: CPT | Performed by: PHYSICAL THERAPIST

## 2020-07-24 PROCEDURE — 97140 MANUAL THERAPY 1/> REGIONS: CPT | Performed by: PHYSICAL THERAPIST

## 2020-07-24 PROCEDURE — 97112 NEUROMUSCULAR REEDUCATION: CPT | Performed by: PHYSICAL THERAPIST

## 2020-07-24 NOTE — PROGRESS NOTES
Daily Note     Today's date: 2020  Patient name: Minnie Zepeda  : 1980  MRN: [id number]  Referring provider: Estelle Jovel DO  Dx:   Encounter Diagnosis     ICD-10-CM    1  It band syndrome, right M76 31    2  Primary osteoarthritis of right knee M17 11               Subjective: Patient reports she continues to have similar symptoms with her knee  She notes her back has been feeling better with her steroid pack but now that it is finished she is experiencing similar symptoms  She spoke to pain management yesterday and has an appointment scheduled  Objective: See treatment diary below    Assessment:   Stage: subacute   Stability of Symptoms: unchanging   Symptom Irritability Level: moderate  Primary Movement Diagnosis: (R) ITB Syndrome, (R) Knee OA  Goal(s):   STG to be met in 3 weeks (20):  - Increase (B) Hip strength by 1/2 MMT grade  - I with HEP  LTG to be met in 6 weeks (20):  - Increase (B) Hip strength to 4+/5 all planes  - I with updated HEP   - Patient will be able to return to regular gym-based exercise without increased pain or clicking  Greatest Concern: clicking is annoying  Current Activity Recommendations: added HEP (); added lumbar extension based HEP (7/10); updated HEP ()  Current Educational Needs: progressions, realistic expectations    Patient had no adverse reaction to IASTM - no significant changes seen  She had decreased pain in her lower back with prone positions - advised to gently start here over the next week, try to stay active, and use the pool as able to stay active  She had good understanding of management strategies  She had good tolerance to hip exercises - no complaints of increased pain post treatment       Plan: Continue with POC - monitor tolerance to HEP - monitor lumbar symptoms      Daily Treatment Diary     DX: (R) ITB Syndrome, (R) Knee OA  EPOC: 20  Precautions: standard  CO-MORBIDITIES: HTN      Manual  7/7 7/10 7/21 7/24 (R) ITB Stretch         (R) ITB IASTM 8 min 8 min 8 min 8 min                                    Exercise Diary  HEP 7/7 7/10 7/21 7/24     Therapeutic Exercise           Recumbent Bike  6 min 6 min 6 min 6 min               SLR 7/1 5"x10 ea 5"x10 ea 5"x15 ea 5"x15 ea     S/L Hip ABD 7/1 5"x10 ea 5"x10 ea 5"x15 ea 5"x15 ea     PHE 7/1 5"x10 ea 5"x10 ea       Prone Glute Raise 7/1 5"x10 ea 5"x10 ea       Bridge 7/21   5"x15 5"x15               Standing ITB Stretch 7/1 30"x3        Prone Lying     2 min     Prone on Elbows     1 min     Standing Lumbar Ext 7/10  20x DC      Prone Press Up 7/10  10x DC      Neuromuscular Reeducation          TB Counter Balance  GTB  10x ea  GTB  10x ea GTB  10x ea               PPT 7/21   5"x10      LFS: Alt LE          LFS: [place] UE/LE                    FWD Mini Lunge          LAT Mini Lunge          Mini Squat                    LAT Heel Tap                    FWD Step Up          LAT Step Up                    Therapeutic Activity                              Gait Training                        Modalities

## 2020-08-03 ENCOUNTER — HOSPITAL ENCOUNTER (OUTPATIENT)
Dept: BONE DENSITY | Facility: IMAGING CENTER | Age: 40
Discharge: HOME | End: 2020-08-03
Payer: COMMERCIAL

## 2020-08-03 VITALS — HEIGHT: 62 IN | WEIGHT: 158 LBS | BODY MASS INDEX: 29.08 KG/M2

## 2020-08-03 DIAGNOSIS — Z12.31 ENCOUNTER FOR SCREENING MAMMOGRAM FOR MALIGNANT NEOPLASM OF BREAST: ICD-10-CM

## 2020-08-03 PROCEDURE — 77067 SCR MAMMO BI INCL CAD: CPT

## 2020-08-03 PROCEDURE — 77063 BREAST TOMOSYNTHESIS BI: CPT

## 2020-08-04 ENCOUNTER — EVALUATION (OUTPATIENT)
Dept: PHYSICAL THERAPY | Facility: CLINIC | Age: 40
End: 2020-08-04
Payer: COMMERCIAL

## 2020-08-04 DIAGNOSIS — M76.31 IT BAND SYNDROME, RIGHT: Primary | ICD-10-CM

## 2020-08-04 DIAGNOSIS — M17.11 PRIMARY OSTEOARTHRITIS OF RIGHT KNEE: ICD-10-CM

## 2020-08-04 PROCEDURE — 97110 THERAPEUTIC EXERCISES: CPT | Performed by: PHYSICAL THERAPIST

## 2020-08-04 NOTE — PROGRESS NOTES
PT Evaluation     Today's date: 2020  Patient name: Edwardo Williamson  : 1980  MRN: [id number]  Referring provider: Ping Jesus DO  Dx:   Encounter Diagnosis     ICD-10-CM    1  It band syndrome, right  M76 31    2  Primary osteoarthritis of right knee  M17 11                   Assessment  Assessment details: Patient has attended 6 PT treatment sessions from 20 to 20  Since initial evaluation patient displays with objective improvements with hip strength  She has no change in pain levels, ROM, or function  Since starting PT she has also developed LBP with radiating pain down the posterior aspect of her (R) leg to the calf  At this time secondary to no functional change and persistent clicking in the knee it is recommended that she return to Dr Janelle Govea for further treatment and consult with Dr Josy Zelaya for her back pain  Please contact me if you have any questions  Thank you for the opportunity to share in the care of this patient       Impairments: abnormal gait, abnormal muscle tone, abnormal or restricted ROM, abnormal movement, activity intolerance, impaired balance, impaired physical strength, lacks appropriate home exercise program, pain with function and poor body mechanics  Understanding of Dx/Px/POC: good   Prognosis: good  Prognosis details: Positive prognostic indicators include: positive attitude toward recovery, motivated to improve  Negative prognostic indicators include persistent clicking  Goals  STG to be met in 3 weeks (20):  - Increase (B) Hip strength by 1/2 MMT grade  - met  - I with HEP  - met  LTG to be met in 6 weeks (20):  - Increase (B) Hip strength to 4+/5 all planes  - I with updated HEP   - Patient will be able to return to regular gym-based exercise without increased pain or clicking         Plan  Plan details: Trial hold with PT pending visit with Dr Josy Zelaya    Patient would benefit from: skilled physical therapy  Planned modality interventions: cryotherapy and thermotherapy: hydrocollator packs  Planned therapy interventions: joint mobilization, manual therapy, neuromuscular re-education, patient education, strengthening, stretching, therapeutic activities, therapeutic exercise, home exercise program, body mechanics training, activity modification, functional ROM exercises, gait training and balance  Treatment plan discussed with: patient and PTA        Subjective Evaluation    History of Present Illness  Mechanism of injury: At Evaluation (20): Patient reports having knee pain since 2018 - no clear AGUSTÍN - she saw her PCP - referred to PT - no significant improvement  She reports a few months ago she started to notice clicking in the anterior knee  She saw Dr Unique Lai - x-ray and MRI were ordered and PT was recommended  Red Flag Screen:  Patient denies recent fever, changes in bowel and bladder function, nausea and vomiting, weakness, unexplained weight loss, tingling, numbness, pain with coughing, or traumatic AGUSTÍN       Greatest concern: clicking is annoying     Currently (20): Patient reports since starting PT she has been compliant with her HEP for the knee and sees no change in symptoms  She also has developed low back pain with radiating symptoms down the posterior aspect of the (R) leg to calf  She notes she continues to have knee clicking with knee flexion/ extension  She has an appointment with pain management to consult about lower back pain      Quality of life: good    Pain  Current pain ratin  At best pain ratin  At worst pain rating: 3  Location: (R) Knee  - ITB region   Quality: dull ache    Social Support  Steps to enter house: yes  Stairs in house: yes   Lives in: multiple-level home    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)    Diagnostic Tests  X-ray: abnormal  MRI studies: abnormal  Patient Goals  Patient goal: Functioning without knee clicking; regular exercise program - squats, lunges, running - not met        Objective     Static Posture   General Observations  Symmetrical weight bearing  Palpation     Additional Palpation Details  No TTP throughout (R) knee    Active Range of Motion     Right Knee   Flexion: WFL  Extension: [facility]    Additional Active Range of Motion Details  No pain with (R) knee flex/ ext OP    Mobility   Patellar Mobility:     Right Knee   WFL: medial, lateral, superior and inferior    Strength/Myotome Testing     Left Hip   Planes of Motion   Extension: 4  Abduction: 4+    Right Hip   Planes of Motion   Extension: 4  Abduction: 4+    Left Knee   Flexion: 5  Extension: 5    Right Knee   Flexion: 5  Extension: 5    Ambulation     Observational Gait   Gait: antalgic   Decreased walking speed, right stance time, left step length and right step length  Left foot contact pattern: foot flat  Right foot contact pattern: foot flat  Left arm swing: decreased  Right arm swing: decreased  Base of support: increased    Additional Observational Gait Details  (R) foot toe out    Functional Assessment        Comments  Squat: limited depth - hip dominant - (R) clicking - weight shifted to the (L)   FWD Lunge: poor balance and control - limited depth  SL Balance: EO - (B) > 10 sec - ankle strategy  LAT Step Down: significant dynamic valgus (B) - no increase in pain        Daily Treatment Diary     DX: (R) ITB Syndrome, (R) Knee OA  EPOC: 8/12/20  Precautions: standard  CO-MORBIDITIES: HTN    Stage: subacute   Stability of Symptoms: unchanging   Symptom Irritability Level: moderate  Primary Movement Diagnosis: (R) ITB Syndrome, (R) Knee OA  Goal(s):   STG to be met in 3 weeks (7/22/20):  - Increase (B) Hip strength by 1/2 MMT grade  - I with HEP  LTG to be met in 6 weeks (8/12/20):  - Increase (B) Hip strength to 4+/5 all planes  - I with updated HEP   - Patient will be able to return to regular gym-based exercise without increased pain or clicking     Greatest Concern: clicking is annoying  Current Activity Recommendations: added HEP (7/1)  Current Educational Needs: progressions, realistic expectations      Manual  7/7 7/10 7/21 7/24 8/4    (R) ITB Stretch         (R) ITB IASTM 8 min 8 min 8 min 8 min                                    Exercise Diary  HEP 7/7 7/10 7/21 7/24 8/4    Therapeutic Exercise           Recumbent Bike  6 min 6 min 6 min 6 min 6 min              SLR 7/1 5"x10 ea 5"x10 ea 5"x15 ea 5"x15 ea     S/L Hip ABD 7/1 5"x10 ea 5"x10 ea 5"x15 ea 5"x15 ea     PHE 7/1 5"x10 ea 5"x10 ea       Prone Glute Raise 7/1 5"x10 ea 5"x10 ea       Bridge 7/21   5"x15 5"x15               Standing ITB Stretch 7/1 30"x3        Prone Lying     2 min     Prone on Elbows     1 min     Standing Lumbar Ext 7/10  20x DC      Prone Press Up 7/10  10x DC      Neuromuscular Reeducation          TB Counter Balance  GTB  10x ea  GTB  10x ea GTB  10x ea               PPT 7/21   5"x10      LFS: Alt LE          LFS: [place] UE/LE                    FWD Mini Lunge          LAT Mini Lunge          Mini Squat                    LAT Heel Tap                    FWD Step Up          LAT Step Up                    Therapeutic Activity                              Gait Training                        Modalities

## 2020-08-11 ENCOUNTER — CONSULT (OUTPATIENT)
Dept: PAIN MEDICINE | Facility: CLINIC | Age: 40
End: 2020-08-11
Payer: COMMERCIAL

## 2020-08-11 ENCOUNTER — APPOINTMENT (OUTPATIENT)
Dept: RADIOLOGY | Facility: CLINIC | Age: 40
End: 2020-08-11
Payer: COMMERCIAL

## 2020-08-11 VITALS
SYSTOLIC BLOOD PRESSURE: 128 MMHG | DIASTOLIC BLOOD PRESSURE: 82 MMHG | HEART RATE: 87 BPM | HEIGHT: 62 IN | WEIGHT: 163 LBS | BODY MASS INDEX: 30 KG/M2 | TEMPERATURE: 98.4 F

## 2020-08-11 DIAGNOSIS — G89.29 CHRONIC PAIN OF RIGHT KNEE: ICD-10-CM

## 2020-08-11 DIAGNOSIS — M54.16 RADICULOPATHY, LUMBAR REGION: ICD-10-CM

## 2020-08-11 DIAGNOSIS — G89.4 CHRONIC PAIN SYNDROME: Primary | ICD-10-CM

## 2020-08-11 DIAGNOSIS — G89.29 CHRONIC RIGHT-SIDED LOW BACK PAIN WITHOUT SCIATICA: ICD-10-CM

## 2020-08-11 DIAGNOSIS — G89.4 CHRONIC PAIN SYNDROME: ICD-10-CM

## 2020-08-11 DIAGNOSIS — M25.561 CHRONIC PAIN OF RIGHT KNEE: ICD-10-CM

## 2020-08-11 DIAGNOSIS — M54.50 CHRONIC RIGHT-SIDED LOW BACK PAIN WITHOUT SCIATICA: ICD-10-CM

## 2020-08-11 DIAGNOSIS — M17.11 PRIMARY OSTEOARTHRITIS OF RIGHT KNEE: ICD-10-CM

## 2020-08-11 PROCEDURE — 72114 X-RAY EXAM L-S SPINE BENDING: CPT

## 2020-08-11 PROCEDURE — 99244 OFF/OP CNSLTJ NEW/EST MOD 40: CPT | Performed by: NURSE PRACTITIONER

## 2020-08-11 PROCEDURE — 3079F DIAST BP 80-89 MM HG: CPT | Performed by: NURSE PRACTITIONER

## 2020-08-11 PROCEDURE — 3008F BODY MASS INDEX DOCD: CPT | Performed by: NURSE PRACTITIONER

## 2020-08-11 PROCEDURE — 1036F TOBACCO NON-USER: CPT | Performed by: NURSE PRACTITIONER

## 2020-08-11 PROCEDURE — 3074F SYST BP LT 130 MM HG: CPT | Performed by: NURSE PRACTITIONER

## 2020-08-11 RX ORDER — GABAPENTIN 300 MG/1
CAPSULE ORAL
Qty: 30 CAPSULE | Refills: 0 | Status: SHIPPED | OUTPATIENT
Start: 2020-08-11 | End: 2020-09-03 | Stop reason: SDUPTHER

## 2020-08-11 RX ORDER — PREDNISONE 10 MG/1
TABLET ORAL
Qty: 21 TABLET | Refills: 0 | Status: SHIPPED | OUTPATIENT
Start: 2020-08-11 | End: 2020-09-21

## 2020-08-11 NOTE — PROGRESS NOTES
Assessment:  1  Chronic pain syndrome    2  Chronic right-sided low back pain without sciatica    3  Chronic pain of right knee    4  Radiculopathy, lumbar region    5  Primary osteoarthritis of right knee        Plan:  I discussed the patient that at this point time I do feel that would be beneficial proceed with an x-ray of the lumbosacral spine with flexion and extension views to evaluate for any instability or fractures  I also feel would be beneficial proceed with an MRI of the lumbosacral spine without contrast again to better evaluate any underlying etiology and causes of her pain  I explained to the patient that once we have the results of the imaging, our office will give her a call to review results and discuss the next step in her treatment which may include an epidural steroid injection with Dr Tommy Michelle  The patient was agreeable and verbalized an understanding  I encouraged the patient continue with the physical therapy and stressed the importance of the home exercise and stretching program   The patient was agreeable and verbalized an understanding  I discussed with the patient that at this point time since I feel that there is a significant inflammatory component to their pain symptoms, that they would benefit from a repeat and larger titrating dose of oral steroids over the next 7 days  I advised the patient that while on the steroids, they should not take any other oral NSAIDs except for acetaminophen or Tylenol until they have completed the steroid taper  I also advised them that once they have completed the steroid taper, they are to give our office a follow up phone call to let us know how they are doing and if there is any improvement  The patient was agreeable and verbalized an understanding  To address the neuropathic component, I explained the patient that I feel that she would benefit from a neuron membrane stabilizer such as gabapentin   I discussed with the patient the type of medication it is, how it works, and that it requires a titration process that is specific to each individual  I reviewed with the patient that it may take 3-4 weeks for the medication's effects to be noticed and that it should never be abruptly stopped  Possible side effects include but are not limited to; vertigo, lethargy, nausea, and edema of the extremities  Advised the patient to call our office if they experience any side effects  The patient verbalized an understanding  The patient is schedule follow-up office visit in 4 weeks and at that point time we will re-evaluate her medication regimen and treatment plan options  The patient was agreeable and verbalized an understanding  History of Present Illness: The patient is a 38 y o  female who presents for consultation in regards to Leg Pain and Back Pain  Symptoms have been present for approximately 2 and half months  Symptoms began following a non work related injury as the patient reports that on June 14, 2020 she bent over to  a piece of wash off the floor and all the sudden felt a sharp pain in the right side of her low back and but  Patient reports that initially the pain was much worse and did follow-up with Dr Cl Adame as at the time she was being treated for her right knee pain  She reports that she has been doing physical therapy for her knee and her lumbar spine since July 1, 2020 and continues with physical therapy to date without any significant overall improvement in the back and lower extremity radicular symptoms  Most recently the patient had complete a Medrol Dosepak with almost complete relief of her pain symptoms while she was on the Medrol Dosepak, however, once she discontinue the Medrol Dosepak, her symptoms started to return and although they are not as bad as they were initially, it is still disrupting her activities of daily living and overall quality of life    She reports that the pain is mostly in the right side of her back and low but and radiates down the posterior and lateral aspect of her right leg in an L4-L5 and L5-S1 dermatome distribution  Pain is reported to be 5 on the numeric rating scale  Symptoms are felt constantly and worst in the no typical pattern  Symptoms are characterized as dull/aching, pressure-like and throbbing  Symptoms are associated with no weakness  Aggravating factors include coughing/sneezing and bowel movements  Relieving factors include: None  No change in symptoms with kneeling, lying down, standing, bending, leaning forward, leaning bckward, sitting, walking, exercise and relaxation  Treatments that have been helpful include heat/ice  physical therapy and home exercise have provided no relief  Medications to relieve symptoms include 800 mg of ibuprofen every 6 hours as needed with moderate overall relief  The patient presents today for evaluation of her continued right-sided low back, leg pain, and radicular symptoms  Review of Systems:    Review of Systems   Constitutional: Negative for fever and unexpected weight change  HENT: Negative for trouble swallowing  Eyes: Negative for visual disturbance  Respiratory: Negative for shortness of breath and wheezing  Cardiovascular: Negative for chest pain and palpitations  Gastrointestinal: Negative for constipation, diarrhea, nausea and vomiting  Endocrine: Negative for cold intolerance, heat intolerance and polydipsia  Genitourinary: Negative for difficulty urinating and frequency  Musculoskeletal: Negative for arthralgias, gait problem, joint swelling and myalgias  Skin: Negative for rash  Neurological: Negative for dizziness, seizures, syncope, weakness and headaches  Hematological: Does not bruise/bleed easily  Psychiatric/Behavioral: Negative for dysphoric mood  All other systems reviewed and are negative          Past Medical History:   Diagnosis Date    Allergic 2013    Disease of thyroid gland 2002    Hypertension        Past Surgical History:   Procedure Laterality Date     SECTION  ,     CHOLECYSTECTOMY  2014    LIVER BIOPSY      OOPHORECTOMY Left 2019       Family History   Problem Relation Age of Onset    Arthritis Mother     Arthritis Maternal Grandmother     Psoriasis Daughter     No Known Problems Father     No Known Problems Maternal Grandfather     No Known Problems Paternal Grandmother     No Known Problems Paternal Grandfather     No Known Problems Maternal Aunt        Social History     Occupational History    Not on file   Tobacco Use    Smoking status: Never Smoker    Smokeless tobacco: Never Used   Substance and Sexual Activity    Alcohol use: Not Currently     Alcohol/week: 1 0 standard drinks     Types: 1 Standard drinks or equivalent per week     Comment: Very Occasionally - not even once a month    Drug use: Never    Sexual activity: Yes     Partners: Male     Birth control/protection: Male Sterilization         Current Outpatient Medications:     cholecalciferol (VITAMIN D3) 25 mcg (1,000 units) tablet, Take 2,000 Units by mouth daily , Disp: , Rfl:     Ferrous Sulfate (IRON) 325 (65 Fe) MG TABS, Take 325 mg by mouth, Disp: , Rfl:     labetalol (NORMODYNE) 100 mg tablet, TAKE 1 TABLET BY MOUTH TWICE DAILY, Disp: , Rfl:     levothyroxine 88 mcg tablet, TAKE 1 TABLET BY MOUTH ONCE DAILY IN THE MORNING ON AN EMPTY STOMACH, Disp: 30 tablet, Rfl: 0    loratadine (CLARITIN) 10 mg tablet, Take 10 mg by mouth daily, Disp: , Rfl:     Multiple Vitamin (MULTI-VITAMIN DAILY PO), Take 1 tablet by mouth daily, Disp: , Rfl:     oxymetazoline (AFRIN) 0 05 % nasal spray, into each nostril, Disp: , Rfl:     gabapentin (NEURONTIN) 300 mg capsule, Take 1 PO HS , Disp: 30 capsule, Rfl: 0    predniSONE 10 mg tablet, Take 2 PO TID or 6 pills on the first day, then decrease by 1 pill daily until gone , Disp: 21 tablet, Rfl: 0    Allergies Allergen Reactions    Penicillins Rash       Physical Exam:    /82 (BP Location: Left arm, Patient Position: Sitting, Cuff Size: Standard)   Pulse 87   Temp 98 4 °F (36 9 °C)   Ht 5' 2" (1 575 m)   Wt 73 9 kg (163 lb)   LMP 07/14/2020 (Exact Date)   BMI 29 81 kg/m²     Constitutional: normal, well developed, well nourished, alert, in no distress and non-toxic and no overt pain behavior  Eyes: anicteric  HEENT: grossly intact  Neck: supple, symmetric, trachea midline and no masses   Pulmonary:even and unlabored  Cardiovascular:No edema or pitting edema present  Skin:Normal without rashes or lesions and well hydrated  Psychiatric:Mood and affect appropriate  Neurologic:Cranial Nerves II-XII grossly intact  Musculoskeletal:The patient's gait is slightly antalgic, but steady without the use of any assistive devices  Lumbar Spine Exam    Appearance:  Normal lordosis  Palpation/Tenderness:  right lumbar paraspinal tenderness  right sacroiliac joint tenderness  right piriformis tenderness  Sensory:  no sensory deficits noted  Range of Motion:  Flexion:   Moderately limited  with pain  Extension:  No limitation  without pain  Rotation - Left:  Minimally limited  with pain  Rotation - Right:  Minimally limited  with pain  Motor Strength:  Left hip flexion:  5/5  Left hip extension:  5/5  Right hip flexion:  5/5  Right hip extension:  5/5  Left knee flexion:  5/5  Left knee extension:  5/5  Right knee flexion:  5/5  Right knee extension:  5/5  Left foot dorsiflexion:  5/5  Left foot plantar flexion:  5/5  Right foot dorsiflexion:  5/5  Right foot plantar flexion:  5/5  Reflexes:  Left Patellar:  2+   Right Patellar:  2+   Left Achilles:  absent   Right Achilles:  absent   Special Tests:  Left Straight Leg Test:  negative  Right Straight Leg Test:  positive  Left Dylan's Maneuver:  negative  Right Dylan's Maneuver:  negative  Left Gaenslen's Test:  negative  Right Gaenslen's Test: negative      Imaging    MRI RIGHT KNEE 6/19/2020     INDICATION:   M23 91: Unspecified internal derangement of right knee      COMPARISON: X-ray right knee dated June 12, 2020      TECHNIQUE:    MR sequences were obtained of the right knee including:  Localizer, axial T2 fat sat, coronal T1/T2 fat sat, sagittal PD/T2 fat sat  Imaging performed on 1 5T MRI   Gadolinium was not used      FINDINGS:     SUBCUTANEOUS TISSUES: Normal     JOINT EFFUSION: None      BAKER'S CYST: None      MENISCI: Intact      CRUCIATE LIGAMENTS: Intact      EXTENSOR APPARATUS: Intact      COLLATERAL LIGAMENTS: Intact      ARTICULAR SURFACES:   Medial tibiofemoral compartment: Mild cartilage thinning and irregularity with mild subchondral edema posteriorly  Small degenerative osteophytes  Lateral tibiofemoral compartment: Mild cartilage thinning and irregularity  Small degenerative osteophytes  Patellofemoral compartment: Severe patellofemoral osteoarthritis with full-thickness cartilage loss along the lateral patellar facet and lateral femoral trochlea  There is associated marked joint space narrowing, subchondral edema/cystic changes and   degenerative osteophytes      BONES: Normal      MUSCULATURE:  Intact      IMPRESSION:        1  Severe patellofemoral osteoarthritis and mild osteoarthritis in the medial and lateral tibiofemoral compartments      2    Intact menisci and ligaments

## 2020-08-13 ENCOUNTER — TELEPHONE (OUTPATIENT)
Dept: PAIN MEDICINE | Facility: CLINIC | Age: 40
End: 2020-08-13

## 2020-08-13 DIAGNOSIS — M51.27 HERNIATED NUCLEUS PULPOSUS, L5-S1, RIGHT: ICD-10-CM

## 2020-08-13 DIAGNOSIS — M54.16 RADICULOPATHY, LUMBAR REGION: Primary | ICD-10-CM

## 2020-08-13 DIAGNOSIS — G89.29 CHRONIC RIGHT-SIDED LOW BACK PAIN WITHOUT SCIATICA: ICD-10-CM

## 2020-08-13 DIAGNOSIS — E03.9 HYPOTHYROIDISM, UNSPECIFIED TYPE: ICD-10-CM

## 2020-08-13 DIAGNOSIS — M54.50 CHRONIC RIGHT-SIDED LOW BACK PAIN WITHOUT SCIATICA: ICD-10-CM

## 2020-08-13 RX ORDER — LEVOTHYROXINE SODIUM 88 UG/1
TABLET ORAL
Qty: 30 TABLET | Refills: 0 | Status: SHIPPED | OUTPATIENT
Start: 2020-08-13 | End: 2020-09-10

## 2020-08-13 NOTE — TELEPHONE ENCOUNTER
Can you please call the patient and advise her that her lumbar spine x-rays showed mild, but stable deg changes/OA without any evidence of instability or slippage as well as any fractures  She should continue the tx plan we discussed at her consult and proceed with the lumbar spine MRI and we will call her once we have those results

## 2020-08-17 NOTE — TELEPHONE ENCOUNTER
Pt called in to say that the letter she received requesting more information  Please be advise thank you        Please call patient back @  [phone]

## 2020-08-18 ENCOUNTER — HOSPITAL ENCOUNTER (OUTPATIENT)
Dept: RADIOLOGY | Facility: HOSPITAL | Age: 40
Discharge: HOME | End: 2020-08-18
Payer: COMMERCIAL

## 2020-08-18 DIAGNOSIS — G89.29 CHRONIC RIGHT-SIDED LOW BACK PAIN WITHOUT SCIATICA: ICD-10-CM

## 2020-08-18 DIAGNOSIS — M25.561 CHRONIC PAIN OF RIGHT KNEE: ICD-10-CM

## 2020-08-18 DIAGNOSIS — I10 BENIGN ESSENTIAL HTN: Primary | ICD-10-CM

## 2020-08-18 DIAGNOSIS — G89.4 CHRONIC PAIN SYNDROME: ICD-10-CM

## 2020-08-18 DIAGNOSIS — M54.16 RADICULOPATHY, LUMBAR REGION: ICD-10-CM

## 2020-08-18 DIAGNOSIS — G89.29 CHRONIC PAIN OF RIGHT KNEE: ICD-10-CM

## 2020-08-18 DIAGNOSIS — M54.50 CHRONIC RIGHT-SIDED LOW BACK PAIN WITHOUT SCIATICA: ICD-10-CM

## 2020-08-18 PROCEDURE — 72148 MRI LUMBAR SPINE W/O DYE: CPT

## 2020-08-18 PROCEDURE — G1004 CDSM NDSC: HCPCS

## 2020-08-18 NOTE — TELEPHONE ENCOUNTER
[address] [facility]  TRACKING # [id number]  AUTH # [id number]  VALID: 08/12/2020-12/10/2020  [address] [place], [place] [address]Janice [place]

## 2020-08-18 NOTE — TELEPHONE ENCOUNTER
S/w pt, stated that her mri is scheduled at 200 today at [facility], questioning if her mri is approved - can she proceed? Advised pt, will fu w/ MRI auth team  Anticipate a cb    Pt stated that she finished her steroids on sunday  Per pt, significant improvement while taking the steroids, ~ 50% at this point  Advised pt, will make DG aware  Pt verbalized understanding and appreciation

## 2020-08-18 NOTE — TELEPHONE ENCOUNTER
S/w pt, advised of above  Pt verbalized understanding and appreciation  Will proceed w/ mri as discussed

## 2020-08-19 RX ORDER — LABETALOL 100 MG/1
100 TABLET, FILM COATED ORAL 2 TIMES DAILY
Qty: 60 TABLET | Refills: 5 | Status: SHIPPED | OUTPATIENT
Start: 2020-08-19 | End: 2020-09-30

## 2020-08-20 ENCOUNTER — TELEPHONE (OUTPATIENT)
Dept: FAMILY MEDICINE CLINIC | Facility: HOSPITAL | Age: 40
End: 2020-08-20

## 2020-08-20 DIAGNOSIS — N94.89 ADNEXAL MASS: Primary | ICD-10-CM

## 2020-08-20 NOTE — TELEPHONE ENCOUNTER
Pt does seen [facility], she will call and make an appointment with them - however, can we order the [address]?

## 2020-08-20 NOTE — TELEPHONE ENCOUNTER
--BONNYI-    RN s/w pt regarding previous  Requested that RN calls back and leaves detailed VMM on her machine so that she can remember all of what RN said  RN did same    MRI report mailed to home as per request   Pt to call  PCP to make appt to address Adrenal cyst

## 2020-08-20 NOTE — TELEPHONE ENCOUNTER
----- Message from Lloyd Morales MD sent at 8/20/2020 10:39 AM EDT -----   Please call patient  She had a MRI done through pain mgt  I will let pain mgt review the spine with her  However incidentally there is a adnexal cyst on the left and needs further evaluation  If already known and seeing GYN that's ok  However I don't see that she does and would like US of the pelvis and referral to GYN regarding this  Let me know if these need to be ordered  Thank you

## 2020-08-20 NOTE — TELEPHONE ENCOUNTER
Can you please call the patient and advise her that Dr Cathy Lee and I reviewed her lumbar MRI results and it does show a new or worsening severe disc herniation to the Right at the L5-S1 level with compression of the S1 nerve root causing severe stenosis  Dr Morgan Spine feels that a surgical consultation is her best option at this point  We can recommend Dr Jacqueline Palma or Dr Miriam Falcon  Let me know if she is interested in the surgical eval?     Also, incidentally there is a large 7 5 x 4 9 cm left adrenal gland cyst  We recommend that she call her PCP for further evaluation and tx as an U/S is recommended  Please fax the MRI results to the PCP and let them know why we are referring her back to them

## 2020-08-20 NOTE — TELEPHONE ENCOUNTER
RN attempted to reach pt regarding previous  LVMMOM with c/b number office hours and location  Pt then s/w PCP's office, Laila and office is aware of MRI result and can view in EPIC      Laila aware when pt calls back we will give her results and recs and have her call PCP for an appt to f/u on adrenal cyst

## 2020-08-20 NOTE — TELEPHONE ENCOUNTER
Can you please look at this patients lumbar MRI and let me know what you think or how we should proceed? Thank you

## 2020-08-21 NOTE — TELEPHONE ENCOUNTER
I put in a referral for Dr Brenda Ahumada with Neurosurgery  As far as her upcoming OV, she can cancel it, but she should call us before she is out of the gabapentin

## 2020-08-21 NOTE — TELEPHONE ENCOUNTER
Pt is calling to get a referral put in for jed    Pt c/b [phone]    Pt also asked if she should keep her upcoming appt for 9/9 if not she asked if you could cancel it and leave a detailed message with conformation the referral was sent over and also about her appt

## 2020-08-21 NOTE — TELEPHONE ENCOUNTER
Attempted to reach patient  Left detailed message if patient wants to cancel her office visit she will need to c/b and make us aware  Provided CB#, [place]

## 2020-08-22 ENCOUNTER — HOSPITAL ENCOUNTER (OUTPATIENT)
Dept: ULTRASOUND IMAGING | Facility: HOSPITAL | Age: 40
Discharge: HOME | End: 2020-08-22
Payer: COMMERCIAL

## 2020-08-22 DIAGNOSIS — N94.89 ADNEXAL MASS: ICD-10-CM

## 2020-08-22 PROCEDURE — 76856 US EXAM PELVIC COMPLETE: CPT

## 2020-08-22 PROCEDURE — 76830 TRANSVAGINAL US NON-OB: CPT

## 2020-08-25 ENCOUNTER — TELEPHONE (OUTPATIENT)
Dept: FAMILY MEDICINE CLINIC | Facility: HOSPITAL | Age: 40
End: 2020-08-25

## 2020-08-25 NOTE — TELEPHONE ENCOUNTER
Patient has appt patrick/ [facility] 8/26 @12:30  They would like the pelvic US results faxed to them prior to the appointment  We did not get a results message to give to patient so I did not fax the results      Can you pls result the US and we can fax the results to [facility] in the am?    Fax:   [phone]

## 2020-08-26 NOTE — TELEPHONE ENCOUNTER
Please call patient   There is a 6 cm cyst seen  Appears benign but as previous I do recommend that gYn see her and will need f/u on this  Please gyn the results

## 2020-09-03 DIAGNOSIS — M54.50 CHRONIC RIGHT-SIDED LOW BACK PAIN WITHOUT SCIATICA: ICD-10-CM

## 2020-09-03 DIAGNOSIS — M25.561 CHRONIC PAIN OF RIGHT KNEE: ICD-10-CM

## 2020-09-03 DIAGNOSIS — M17.11 PRIMARY OSTEOARTHRITIS OF RIGHT KNEE: ICD-10-CM

## 2020-09-03 DIAGNOSIS — M54.16 RADICULOPATHY, LUMBAR REGION: ICD-10-CM

## 2020-09-03 DIAGNOSIS — G89.29 CHRONIC PAIN OF RIGHT KNEE: ICD-10-CM

## 2020-09-03 DIAGNOSIS — G89.29 CHRONIC RIGHT-SIDED LOW BACK PAIN WITHOUT SCIATICA: ICD-10-CM

## 2020-09-03 DIAGNOSIS — G89.4 CHRONIC PAIN SYNDROME: ICD-10-CM

## 2020-09-04 RX ORDER — GABAPENTIN 300 MG/1
CAPSULE ORAL
Qty: 30 CAPSULE | Refills: 1 | Status: SHIPPED | OUTPATIENT
Start: 2020-09-04 | End: 2020-11-06 | Stop reason: ALTCHOICE

## 2020-09-04 NOTE — TELEPHONE ENCOUNTER
LMOM to cb  Provided cb number and office hours       Note:  gabapentin (NEURONTIN) 300 mg capsule [[id number]]     Order Details   Dose, Route, Frequency: As Directed    Dispense Quantity: 30 capsule  Refills: 0  Fills remaining: --             Sig: Take 1 PO HS             Written Date: 08/11/20  Expiration Date: 08/11/21      Start Date: 08/11/20  End Date: --              Ordering Provider: --  Phone:  --  Fax:  --     Address:  --  DALE #:  --  NPI:  --             Authorizing Provider: JESS Stevens           Need to schedule fu ov 4 w from 8/11

## 2020-09-04 NOTE — TELEPHONE ENCOUNTER
The gabapentin is to try to help manage her pain in general and she is on a very low dose so we always have room to increase in the future if she would like  I sent a script for the gabapentin for a 30 day supply with a refill  She should proceed with her surgical evaluation  Have her schedule a f/u OV with me in like 6-7 weeks before she would be out of Gabapentin  Thank you

## 2020-09-08 NOTE — TELEPHONE ENCOUNTER
Called and scheduled patient for a f/u 10-29-20 with DG    Patient stated she checked with [facility] on Saturday 9-5-20 and they didn't have her medication  Please advise

## 2020-09-08 NOTE — TELEPHONE ENCOUNTER
Confirmed gabapentin is at [facility]  Ok to  now  S/w pt, advised of above  Pt verbalized understanding and appreciation

## 2020-09-10 DIAGNOSIS — E03.9 HYPOTHYROIDISM, UNSPECIFIED TYPE: ICD-10-CM

## 2020-09-10 RX ORDER — LEVOTHYROXINE SODIUM 88 UG/1
TABLET ORAL
Qty: 30 TABLET | Refills: 0 | Status: SHIPPED | OUTPATIENT
Start: 2020-09-10 | End: 2020-10-10

## 2020-09-21 ENCOUNTER — CONSULT (OUTPATIENT)
Dept: NEUROSURGERY | Facility: CLINIC | Age: 40
End: 2020-09-21
Payer: COMMERCIAL

## 2020-09-21 VITALS
WEIGHT: 160 LBS | BODY MASS INDEX: 29.44 KG/M2 | DIASTOLIC BLOOD PRESSURE: 108 MMHG | TEMPERATURE: 98.5 F | RESPIRATION RATE: 16 BRPM | HEART RATE: 80 BPM | HEIGHT: 62 IN | SYSTOLIC BLOOD PRESSURE: 168 MMHG

## 2020-09-21 DIAGNOSIS — M54.50 CHRONIC RIGHT-SIDED LOW BACK PAIN WITHOUT SCIATICA: ICD-10-CM

## 2020-09-21 DIAGNOSIS — M51.27 HERNIATED NUCLEUS PULPOSUS, L5-S1, RIGHT: ICD-10-CM

## 2020-09-21 DIAGNOSIS — M54.16 RADICULOPATHY, LUMBAR REGION: ICD-10-CM

## 2020-09-21 DIAGNOSIS — G89.29 CHRONIC RIGHT-SIDED LOW BACK PAIN WITHOUT SCIATICA: ICD-10-CM

## 2020-09-21 PROCEDURE — 99204 OFFICE O/P NEW MOD 45 MIN: CPT | Performed by: NEUROLOGICAL SURGERY

## 2020-09-21 NOTE — PROGRESS NOTES
Assessment/Plan:    No problem-specific Assessment & Plan notes found for this encounter  History, physical examination and diagnostic tests were reviewed and questions answered  Diagnosis, care plan and treatment options were discussed  The patient understand instructions and will follow up as directed  Patient with right leg pain posterior sciatica  With a large L5-S1 right disc herniation  Sx are concordant with imaging  She failed PT  At least 6 weeks of pain symptoms  She is a candidate for discectomy  I do not feel that injections would be a durable solution although I did feel it was reasonable for her to try if she would like to avoid surgery  I discussed the risks and benefits of surgery    IMAGING REVIEWED: MRI lumbar shows L5-S1 large right disc herniation       Diagnoses and all orders for this visit:    Radiculopathy, lumbar region  -     Ambulatory referral to Neurosurgery    Chronic right-sided low back pain without sciatica  -     Ambulatory referral to Neurosurgery    Herniated nucleus pulposus, L5-S1, right  -     Ambulatory referral to Neurosurgery        I have spent 60 minutes with Patient  today in which greater than 50% of this time was spent in counseling/coordination of care regarding Diagnostic results, Prognosis, Risks and benefits of tx options, Intructions for management, Patient and family education, Importance of tx compliance, Risk factor reductions and Impressions  Subjective:      Patient ID: Dennie Langton is a 38 y o  female  Patient is a 38year old female with symptoms of right leg pain pain  Pain is severe, and throbbing in quality  Pain distribution is right lower leg sciatica  Pain is worse with activity  Pain is improved by rest and neurontin  The pain has been present since June  Pain has associated distress and reduced mobility  The patient underwent the following conservative treatments PT  The patient reported symptoms started acutely   No focal deficit reported  The following portions of the patient's history were reviewed and updated as appropriate:   She  has a past medical history of Allergic (), Disease of thyroid gland (), and Hypertension ()  She   Patient Active Problem List    Diagnosis Date Noted    Chronic pain syndrome 2020    Chronic pain of right knee 2020    Primary osteoarthritis of right knee 2020    Chronic right-sided low back pain without sciatica 2020    Radiculopathy, lumbar region 2020    Abnormal uterine bleeding 2019    Ovarian cyst, right 2019     She  has a past surgical history that includes Cholecystectomy ();  section (, ); Oophorectomy (Left, 2019); and Liver biopsy  Her family history includes Arthritis in her maternal grandmother and mother; No Known Problems in her father, maternal aunt, maternal grandfather, paternal grandfather, and paternal grandmother; Psoriasis in her daughter  She  reports that she has never smoked  She has never used smokeless tobacco  She reports previous alcohol use of about 1 0 standard drinks of alcohol per week  She reports that she does not use drugs    Current Outpatient Medications   Medication Sig Dispense Refill    cholecalciferol (VITAMIN D3) 25 mcg (1,000 units) tablet Take 2,000 Units by mouth daily       Ferrous Sulfate (IRON) 325 (65 Fe) MG TABS Take 325 mg by mouth      gabapentin (NEURONTIN) 300 mg capsule Take 1 PO HS  30 capsule 1    labetalol (NORMODYNE) 100 mg tablet Take 1 tablet (100 mg total) by mouth 2 (two) times a day 60 tablet 5    levothyroxine 88 mcg tablet TAKE 1 TABLET BY MOUTH ONCE DAILY IN THE MORNING ON AN EMPTY STOMACH 30 tablet 0    loratadine (CLARITIN) 10 mg tablet Take 10 mg by mouth daily      Multiple Vitamin (MULTI-VITAMIN DAILY PO) Take 1 tablet by mouth daily      oxymetazoline (AFRIN) 0 05 % nasal spray into each nostril       No current facility-administered medications for this visit  Current Outpatient Medications on File Prior to Visit   Medication Sig    cholecalciferol (VITAMIN D3) 25 mcg (1,000 units) tablet Take 2,000 Units by mouth daily     Ferrous Sulfate (IRON) 325 (65 Fe) MG TABS Take 325 mg by mouth    gabapentin (NEURONTIN) 300 mg capsule Take 1 PO HS   labetalol (NORMODYNE) 100 mg tablet Take 1 tablet (100 mg total) by mouth 2 (two) times a day    levothyroxine 88 mcg tablet TAKE 1 TABLET BY MOUTH ONCE DAILY IN THE MORNING ON AN EMPTY STOMACH    loratadine (CLARITIN) 10 mg tablet Take 10 mg by mouth daily    Multiple Vitamin (MULTI-VITAMIN DAILY PO) Take 1 tablet by mouth daily    oxymetazoline (AFRIN) 0 05 % nasal spray into each nostril    [DISCONTINUED] predniSONE 10 mg tablet Take 2 PO TID or 6 pills on the first day, then decrease by 1 pill daily until gone  No current facility-administered medications on file prior to visit  She is allergic to penicillins       Review of Systems   Constitutional: Negative  HENT: Negative  Eyes: Negative  Respiratory: Negative  Cardiovascular: Negative  Gastrointestinal:        Pain with pushing when moving bowels  Endocrine: Negative  Genitourinary: Negative  Musculoskeletal: Positive for back pain (top right buttock radiating down back of right leg into calf  muscle tightness constqnt in calf at times in hamstring)  Skin: Negative  Allergic/Immunologic: Negative  Neurological: Positive for light-headedness  Hematological: Negative  Psychiatric/Behavioral: Negative  I have personally reviewed all aspects of the review of systems as documented    Objective:      BP (!) 168/108 (BP Location: Left arm)   Pulse 80   Temp 98 5 °F (36 9 °C) (Tympanic)   Resp 16   Ht 5' 2" (1 575 m)   Wt 72 6 kg (160 lb)   BMI 29 26 kg/m²          Physical Exam  Constitutional:       Appearance: Normal appearance  She is normal weight     HENT:      Head: Normocephalic and atraumatic  Eyes:      General:         Right eye: No discharge  Left eye: No discharge  Extraocular Movements: Extraocular movements intact  Conjunctiva/sclera: Conjunctivae normal       Pupils: Pupils are equal, round, and reactive to light  Neck:      Musculoskeletal: Normal range of motion  Cardiovascular:      Rate and Rhythm: Normal rate  Pulmonary:      Effort: Pulmonary effort is normal    Musculoskeletal: Normal range of motion  Skin:     General: Skin is warm and dry  Neurological:      General: No focal deficit present  Mental Status: She is alert and oriented to person, place, and time  Mental status is at baseline  Cranial Nerves: Cranial nerves are intact  Sensory: Sensation is intact  Motor: Motor function is intact  Psychiatric:         Mood and Affect: Mood normal          Thought Content:  Thought content normal

## 2020-09-25 ENCOUNTER — TELEPHONE (OUTPATIENT)
Dept: NEUROSURGERY | Facility: CLINIC | Age: 40
End: 2020-09-25

## 2020-09-25 NOTE — TELEPHONE ENCOUNTER
Returned call to patient  seen in office w/ Rice Block 9/21/ 20, interested in proceeding with surgery  "She is a candidate for discectomy"  as per excerpt for Katalina's note     E-mail sent to surgery scheduler please contact patient she request to schedule surgery    Informed patient expect alfredo in one week

## 2020-09-28 RX ORDER — CHLORHEXIDINE GLUCONATE 0.12 MG/ML
15 RINSE ORAL ONCE
Status: CANCELLED | OUTPATIENT
Start: 2020-09-28 | End: 2020-09-28

## 2020-09-30 ENCOUNTER — OFFICE VISIT (OUTPATIENT)
Dept: FAMILY MEDICINE CLINIC | Facility: HOSPITAL | Age: 40
End: 2020-09-30
Payer: COMMERCIAL

## 2020-09-30 VITALS
HEART RATE: 98 BPM | BODY MASS INDEX: 29.33 KG/M2 | HEIGHT: 62 IN | SYSTOLIC BLOOD PRESSURE: 158 MMHG | WEIGHT: 159.4 LBS | DIASTOLIC BLOOD PRESSURE: 98 MMHG | TEMPERATURE: 98 F

## 2020-09-30 DIAGNOSIS — E03.9 HYPOTHYROIDISM, UNSPECIFIED TYPE: ICD-10-CM

## 2020-09-30 DIAGNOSIS — R53.83 OTHER FATIGUE: ICD-10-CM

## 2020-09-30 DIAGNOSIS — G89.29 CHRONIC RIGHT-SIDED LOW BACK PAIN WITHOUT SCIATICA: ICD-10-CM

## 2020-09-30 DIAGNOSIS — M54.50 CHRONIC RIGHT-SIDED LOW BACK PAIN WITHOUT SCIATICA: ICD-10-CM

## 2020-09-30 DIAGNOSIS — N93.8 DUB (DYSFUNCTIONAL UTERINE BLEEDING): ICD-10-CM

## 2020-09-30 DIAGNOSIS — I10 BENIGN ESSENTIAL HTN: Primary | ICD-10-CM

## 2020-09-30 PROCEDURE — 99214 OFFICE O/P EST MOD 30 MIN: CPT | Performed by: FAMILY MEDICINE

## 2020-09-30 RX ORDER — OLMESARTAN MEDOXOMIL AND HYDROCHLOROTHIAZIDE 40/12.5 40; 12.5 MG/1; MG/1
1 TABLET ORAL DAILY
Qty: 90 TABLET | Refills: 0 | Status: SHIPPED | OUTPATIENT
Start: 2020-09-30 | End: 2020-10-14

## 2020-09-30 NOTE — PROGRESS NOTES
Assessment/Plan:      Problem List Items Addressed This Visit        Endocrine    Hypothyroidism       Cardiovascular and Mediastinum    Benign essential HTN - Primary    Relevant Medications    olmesartan-hydrochlorothiazide (BENICAR HCT) 40-12 5 MG per tablet    Other Relevant Orders    CBC    Comprehensive metabolic panel    TSH, 3rd generation with Free T4 reflex       Other    Chronic right-sided low back pain without sciatica      Other Visit Diagnoses     Other fatigue        Relevant Orders    CBC    Comprehensive metabolic panel    TSH, 3rd generation with Free T4 reflex    Iron    Ferritin    DUB (dysfunctional uterine bleeding)        Relevant Orders    CBC    Comprehensive metabolic panel    TSH, 3rd generation with Free T4 reflex    Iron    Ferritin           Plan/Discussion:    Increase Bp readings  May be secondary to Depo-provera  In retrospect previously OCP caused an elevation of BP  She has been on labetalol as started in pregnancy  With ongoing fatigue  Previous labs were normal to include tsh  Discussed switch to benicar-hctz 40-25  Dc labetalol  Continue with DASH diet  DUB  Was started by GYN on depoprovera but this worsened  Will continue f/u with GYN  Check labs, to include iron and ferritin  Continue with iron supplementation  Subjective:   Chief Complaint   Patient presents with    Headache    Dizziness    Hypertension        Patient ID: Lane Pittman is a 38 y o  female  Pt here for BP elevation  Has been on labetalol and previously doing well  She has felt some mild headaches and generally not feeling well  Bp checks show 147-087 with diastolic of 80-716M  She has been on a DASH diet  Over the past month she was also started on Depo-provera for uterine bleeding through GYN  Reports also with ovarian cyst that was found  Increase stressors as well due to low back pain     Contemplating surgery but pain has improved with use of gabapentin  Had normal Bp readings when gabapentin started  No leg swelling  No chest pain, no palpitations  The following portions of the patient's history were reviewed and updated as appropriate: allergies, current medications, past family history, past medical history, past social history, past surgical history and problem list     Review of Systems   Constitutional: Positive for fatigue  Negative for activity change, appetite change, chills, diaphoresis and fever  HENT: Negative for congestion, facial swelling and sore throat  Respiratory: Negative  Negative for apnea, cough, chest tightness and shortness of breath  Cardiovascular: Negative  Negative for chest pain and palpitations  Gastrointestinal: Negative  Negative for abdominal distention, abdominal pain, blood in stool, constipation, diarrhea and nausea  Genitourinary: Negative  Negative for difficulty urinating, dysuria, flank pain and frequency  Objective:  Vitals:    09/30/20 1248   BP: 158/98   Pulse: 98   Temp: 98 °F (36 7 °C)   Weight: 72 3 kg (159 lb 6 4 oz)   Height: 5' 2" (1 575 m)     BP Readings from Last 6 Encounters:   09/30/20 158/98   09/21/20 (!) 168/108   08/11/20 128/82   07/17/20 122/78   06/12/20 119/60   06/08/20 122/88      Wt Readings from Last 6 Encounters:   09/30/20 72 3 kg (159 lb 6 4 oz)   09/21/20 72 6 kg (160 lb)   08/18/20 71 7 kg (158 lb)   08/11/20 73 9 kg (163 lb)   08/03/20 71 7 kg (158 lb)   07/17/20 73 5 kg (162 lb)             Physical Exam  Vitals signs and nursing note reviewed  Constitutional:       Appearance: She is well-developed  She is not ill-appearing  HENT:      Head: Normocephalic and atraumatic  Right Ear: External ear normal       Left Ear: External ear normal       Nose: Nose normal    Eyes:      Conjunctiva/sclera: Conjunctivae normal       Pupils: Pupils are equal, round, and reactive to light     Neck:      Musculoskeletal: Normal range of motion and neck supple  Thyroid: No thyromegaly  Trachea: No tracheal deviation  Cardiovascular:      Rate and Rhythm: Normal rate and regular rhythm  Pulses: Normal pulses  Heart sounds: Normal heart sounds  No murmur  Pulmonary:      Effort: Pulmonary effort is normal  No respiratory distress  Breath sounds: Normal breath sounds  No wheezing  Abdominal:      General: Bowel sounds are normal       Palpations: Abdomen is soft  Musculoskeletal: Normal range of motion  Skin:     General: Skin is warm and dry  Neurological:      Mental Status: She is alert and oriented to person, place, and time  No visits with results within 6 Month(s) from this visit     Latest known visit with results is:   Orders Only on 12/18/2019   Component Date Value Ref Range Status    TSH 02/06/2020 0 364* 0 450 - 4 500 uIU/mL Final    TSH 06/02/2020 0 719  0 450 - 4 500 uIU/mL Final

## 2020-10-03 LAB
ALBUMIN SERPL-MCNC: 5.2 G/DL (ref 3.8–4.8)
ALBUMIN/GLOB SERPL: 1.9 {RATIO} (ref 1.2–2.2)
ALP SERPL-CCNC: 57 IU/L (ref 39–117)
ALT SERPL-CCNC: 18 IU/L (ref 0–32)
AST SERPL-CCNC: 48 IU/L (ref 0–40)
BILIRUB SERPL-MCNC: 0.5 MG/DL (ref 0–1.2)
BUN SERPL-MCNC: 9 MG/DL (ref 6–24)
BUN/CREAT SERPL: 12 (ref 9–23)
CALCIUM SERPL-MCNC: 10.4 MG/DL (ref 8.7–10.2)
CHLORIDE SERPL-SCNC: 101 MMOL/L (ref 96–106)
CO2 SERPL-SCNC: 20 MMOL/L (ref 20–29)
CREAT SERPL-MCNC: 0.75 MG/DL (ref 0.57–1)
ERYTHROCYTE [DISTWIDTH] IN BLOOD BY AUTOMATED COUNT: 11.7 % (ref 11.7–15.4)
FERRITIN SERPL-MCNC: 25 NG/ML (ref 15–150)
GLOBULIN SER-MCNC: 2.8 G/DL (ref 1.5–4.5)
GLUCOSE SERPL-MCNC: 87 MG/DL (ref 65–99)
HCT VFR BLD AUTO: 45.1 % (ref 34–46.6)
HGB BLD-MCNC: 15.6 G/DL (ref 11.1–15.9)
IRON SERPL-MCNC: 171 UG/DL (ref 27–159)
MCH RBC QN AUTO: 32.3 PG (ref 26.6–33)
MCHC RBC AUTO-ENTMCNC: 34.6 G/DL (ref 31.5–35.7)
MCV RBC AUTO: 93 FL (ref 79–97)
PLATELET # BLD AUTO: 331 X10E3/UL (ref 150–450)
POTASSIUM SERPL-SCNC: 6.2 MMOL/L (ref 3.5–5.2)
PROT SERPL-MCNC: 8 G/DL (ref 6–8.5)
RBC # BLD AUTO: 4.83 X10E6/UL (ref 3.77–5.28)
SL AMB EGFR AFRICAN AMERICAN: 114 ML/MIN/1.73
SL AMB EGFR NON AFRICAN AMERICAN: 99 ML/MIN/1.73
SODIUM SERPL-SCNC: 135 MMOL/L (ref 134–144)
TSH SERPL DL<=0.005 MIU/L-ACNC: 1.66 UIU/ML (ref 0.45–4.5)
WBC # BLD AUTO: 8.7 X10E3/UL (ref 3.4–10.8)

## 2020-10-05 ENCOUNTER — TELEPHONE (OUTPATIENT)
Dept: FAMILY MEDICINE CLINIC | Facility: HOSPITAL | Age: 40
End: 2020-10-05

## 2020-10-05 DIAGNOSIS — E87.5 HYPERKALEMIA: ICD-10-CM

## 2020-10-05 DIAGNOSIS — E83.52 HYPERCALCEMIA: Primary | ICD-10-CM

## 2020-10-10 DIAGNOSIS — E03.9 HYPOTHYROIDISM, UNSPECIFIED TYPE: ICD-10-CM

## 2020-10-10 RX ORDER — LEVOTHYROXINE SODIUM 88 UG/1
TABLET ORAL
Qty: 30 TABLET | Refills: 0 | Status: SHIPPED | OUTPATIENT
Start: 2020-10-10 | End: 2020-11-09

## 2020-10-11 LAB
ALBUMIN SERPL-MCNC: 4.8 G/DL (ref 3.8–4.8)
ALBUMIN/GLOB SERPL: 1.8 {RATIO} (ref 1.2–2.2)
ALP SERPL-CCNC: 63 IU/L (ref 39–117)
ALT SERPL-CCNC: 15 IU/L (ref 0–32)
AST SERPL-CCNC: 16 IU/L (ref 0–40)
BILIRUB SERPL-MCNC: 0.4 MG/DL (ref 0–1.2)
BUN SERPL-MCNC: 8 MG/DL (ref 6–24)
BUN/CREAT SERPL: 11 (ref 9–23)
CALCIUM SERPL-MCNC: 10.2 MG/DL (ref 8.7–10.2)
CHLORIDE SERPL-SCNC: 100 MMOL/L (ref 96–106)
CO2 SERPL-SCNC: 23 MMOL/L (ref 20–29)
CREAT SERPL-MCNC: 0.74 MG/DL (ref 0.57–1)
GLOBULIN SER-MCNC: 2.6 G/DL (ref 1.5–4.5)
GLUCOSE SERPL-MCNC: 104 MG/DL (ref 65–99)
MAGNESIUM SERPL-MCNC: 2.2 MG/DL (ref 1.6–2.3)
POTASSIUM SERPL-SCNC: 3.6 MMOL/L (ref 3.5–5.2)
PROT SERPL-MCNC: 7.4 G/DL (ref 6–8.5)
PTH-INTACT SERPL-MCNC: 26 PG/ML (ref 15–65)
SL AMB EGFR AFRICAN AMERICAN: 116 ML/MIN/1.73
SL AMB EGFR NON AFRICAN AMERICAN: 101 ML/MIN/1.73
SODIUM SERPL-SCNC: 138 MMOL/L (ref 134–144)

## 2020-10-14 ENCOUNTER — OFFICE VISIT (OUTPATIENT)
Dept: FAMILY MEDICINE CLINIC | Facility: HOSPITAL | Age: 40
End: 2020-10-14
Payer: COMMERCIAL

## 2020-10-14 VITALS
BODY MASS INDEX: 29.08 KG/M2 | HEART RATE: 109 BPM | DIASTOLIC BLOOD PRESSURE: 80 MMHG | HEIGHT: 62 IN | WEIGHT: 158 LBS | SYSTOLIC BLOOD PRESSURE: 118 MMHG | TEMPERATURE: 97.9 F

## 2020-10-14 DIAGNOSIS — Z01.818 PREOP EXAMINATION: ICD-10-CM

## 2020-10-14 DIAGNOSIS — M54.16 RADICULOPATHY, LUMBAR REGION: ICD-10-CM

## 2020-10-14 DIAGNOSIS — G89.4 CHRONIC PAIN SYNDROME: ICD-10-CM

## 2020-10-14 DIAGNOSIS — G89.29 CHRONIC RIGHT-SIDED LOW BACK PAIN WITHOUT SCIATICA: ICD-10-CM

## 2020-10-14 DIAGNOSIS — M54.50 CHRONIC RIGHT-SIDED LOW BACK PAIN WITHOUT SCIATICA: ICD-10-CM

## 2020-10-14 DIAGNOSIS — E03.9 HYPOTHYROIDISM, UNSPECIFIED TYPE: ICD-10-CM

## 2020-10-14 DIAGNOSIS — I10 BENIGN ESSENTIAL HTN: Primary | ICD-10-CM

## 2020-10-14 PROCEDURE — 99214 OFFICE O/P EST MOD 30 MIN: CPT | Performed by: FAMILY MEDICINE

## 2020-10-14 PROCEDURE — 3079F DIAST BP 80-89 MM HG: CPT | Performed by: FAMILY MEDICINE

## 2020-10-14 PROCEDURE — 1036F TOBACCO NON-USER: CPT | Performed by: FAMILY MEDICINE

## 2020-10-14 RX ORDER — OLMESARTAN MEDOXOMIL AND HYDROCHLOROTHIAZIDE 40/25 40; 25 MG/1; MG/1
1 TABLET ORAL DAILY
Qty: 90 TABLET | Refills: 0 | Status: SHIPPED | OUTPATIENT
Start: 2020-10-14 | End: 2021-01-13

## 2020-10-22 ENCOUNTER — TELEPHONE (OUTPATIENT)
Dept: NEUROSURGERY | Facility: CLINIC | Age: 40
End: 2020-10-22

## 2020-10-22 LAB
APPEARANCE UR: CLEAR
APTT PPP: 27 SEC (ref 24–33)
BACTERIA URNS QL MICRO: ABNORMAL
BASOPHILS # BLD AUTO: 0.1 X10E3/UL (ref 0–0.2)
BASOPHILS NFR BLD AUTO: 1 %
BILIRUB UR QL STRIP: NEGATIVE
COLOR UR: YELLOW
EOSINOPHIL # BLD AUTO: 0.1 X10E3/UL (ref 0–0.4)
EOSINOPHIL NFR BLD AUTO: 1 %
EPI CELLS #/AREA URNS HPF: ABNORMAL /HPF (ref 0–10)
ERYTHROCYTE [DISTWIDTH] IN BLOOD BY AUTOMATED COUNT: 11.1 % (ref 11.7–15.4)
EST. AVERAGE GLUCOSE BLD GHB EST-MCNC: 100 MG/DL
GLUCOSE UR QL: NEGATIVE
HBA1C MFR BLD: 5.1 % (ref 4.8–5.6)
HCG INTACT+B SERPL-ACNC: <1 MIU/ML
HCT VFR BLD AUTO: 41.3 % (ref 34–46.6)
HGB BLD-MCNC: 14.4 G/DL (ref 11.1–15.9)
HGB UR QL STRIP: ABNORMAL
IMM GRANULOCYTES # BLD: 0 X10E3/UL (ref 0–0.1)
IMM GRANULOCYTES NFR BLD: 0 %
INR PPP: 1 (ref 0.9–1.2)
KETONES UR QL STRIP: NEGATIVE
LEUKOCYTE ESTERASE UR QL STRIP: NEGATIVE
LYMPHOCYTES # BLD AUTO: 2.9 X10E3/UL (ref 0.7–3.1)
LYMPHOCYTES NFR BLD AUTO: 40 %
MCH RBC QN AUTO: 32.4 PG (ref 26.6–33)
MCHC RBC AUTO-ENTMCNC: 34.9 G/DL (ref 31.5–35.7)
MCV RBC AUTO: 93 FL (ref 79–97)
MICRO URNS: ABNORMAL
MONOCYTES # BLD AUTO: 0.3 X10E3/UL (ref 0.1–0.9)
MONOCYTES NFR BLD AUTO: 5 %
NEUTROPHILS # BLD AUTO: 3.9 X10E3/UL (ref 1.4–7)
NEUTROPHILS NFR BLD AUTO: 53 %
NITRITE UR QL STRIP: NEGATIVE
PH UR STRIP: 6.5 [PH] (ref 5–7.5)
PLATELET # BLD AUTO: 289 X10E3/UL (ref 150–450)
PROT UR QL STRIP: NEGATIVE
PROTHROMBIN TIME: 10.4 SEC (ref 9.1–12)
RBC # BLD AUTO: 4.45 X10E6/UL (ref 3.77–5.28)
RBC #/AREA URNS HPF: ABNORMAL /HPF (ref 0–2)
SL AMB URINALYSIS REFLEX: ABNORMAL
SP GR UR: 1.01 (ref 1–1.03)
UROBILINOGEN UR STRIP-ACNC: 0.2 MG/DL (ref 0.2–1)
WBC # BLD AUTO: 7.2 X10E3/UL (ref 3.4–10.8)
WBC #/AREA URNS HPF: ABNORMAL /HPF (ref 0–5)

## 2020-11-02 ENCOUNTER — TELEPHONE (OUTPATIENT)
Dept: NEUROSURGERY | Facility: CLINIC | Age: 40
End: 2020-11-02

## 2020-11-04 ENCOUNTER — OFFICE VISIT (OUTPATIENT)
Dept: NEUROSURGERY | Facility: CLINIC | Age: 40
End: 2020-11-04

## 2020-11-04 VITALS
DIASTOLIC BLOOD PRESSURE: 84 MMHG | HEIGHT: 62 IN | RESPIRATION RATE: 16 BRPM | BODY MASS INDEX: 29.26 KG/M2 | TEMPERATURE: 100 F | SYSTOLIC BLOOD PRESSURE: 126 MMHG | HEART RATE: 117 BPM | WEIGHT: 159 LBS

## 2020-11-04 DIAGNOSIS — Z11.59 SCREENING FOR VIRAL DISEASE: ICD-10-CM

## 2020-11-04 DIAGNOSIS — G89.4 CHRONIC PAIN SYNDROME: Primary | ICD-10-CM

## 2020-11-04 DIAGNOSIS — M51.26 LUMBAR DISC HERNIATION: ICD-10-CM

## 2020-11-04 PROCEDURE — 3008F BODY MASS INDEX DOCD: CPT | Performed by: FAMILY MEDICINE

## 2020-11-04 PROCEDURE — PREOP: Performed by: NURSE PRACTITIONER

## 2020-11-05 ENCOUNTER — ANESTHESIA EVENT (OUTPATIENT)
Dept: PERIOP | Facility: HOSPITAL | Age: 40
End: 2020-11-05
Payer: COMMERCIAL

## 2020-11-05 ENCOUNTER — TELEPHONE (OUTPATIENT)
Dept: NEUROSURGERY | Facility: CLINIC | Age: 40
End: 2020-11-05

## 2020-11-05 DIAGNOSIS — G89.4 CHRONIC PAIN SYNDROME: ICD-10-CM

## 2020-11-05 DIAGNOSIS — M51.26 LUMBAR DISC HERNIATION: ICD-10-CM

## 2020-11-05 DIAGNOSIS — Z11.59 SCREENING FOR VIRAL DISEASE: ICD-10-CM

## 2020-11-05 PROCEDURE — U0003 INFECTIOUS AGENT DETECTION BY NUCLEIC ACID (DNA OR RNA); SEVERE ACUTE RESPIRATORY SYNDROME CORONAVIRUS 2 (SARS-COV-2) (CORONAVIRUS DISEASE [COVID-19]), AMPLIFIED PROBE TECHNIQUE, MAKING USE OF HIGH THROUGHPUT TECHNOLOGIES AS DESCRIBED BY CMS-2020-01-R: HCPCS | Performed by: NURSE PRACTITIONER

## 2020-11-06 LAB — SARS-COV-2 RNA SPEC QL NAA+PROBE: NOT DETECTED

## 2020-11-09 ENCOUNTER — TELEPHONE (OUTPATIENT)
Dept: NEUROSURGERY | Facility: CLINIC | Age: 40
End: 2020-11-09

## 2020-11-09 ENCOUNTER — DOCUMENTATION (OUTPATIENT)
Dept: NEUROSURGERY | Facility: CLINIC | Age: 40
End: 2020-11-09

## 2020-11-09 DIAGNOSIS — E03.9 HYPOTHYROIDISM, UNSPECIFIED TYPE: ICD-10-CM

## 2020-11-09 DIAGNOSIS — G89.18 POSTOPERATIVE PAIN: ICD-10-CM

## 2020-11-09 DIAGNOSIS — Z79.2 PROPHYLACTIC ANTIBIOTIC: ICD-10-CM

## 2020-11-09 DIAGNOSIS — Z98.890 POSTOPERATIVE STATE: Primary | ICD-10-CM

## 2020-11-09 RX ORDER — CLINDAMYCIN HYDROCHLORIDE 300 MG/1
600 CAPSULE ORAL 3 TIMES DAILY
Qty: 18 CAPSULE | Refills: 0 | Status: SHIPPED | OUTPATIENT
Start: 2020-11-09 | End: 2020-11-12

## 2020-11-09 RX ORDER — OXYCODONE HYDROCHLORIDE 5 MG/1
TABLET ORAL
Qty: 30 TABLET | Refills: 0 | Status: SHIPPED | OUTPATIENT
Start: 2020-11-09 | End: 2020-12-21

## 2020-11-09 RX ORDER — LEVOTHYROXINE SODIUM 88 UG/1
TABLET ORAL
Qty: 30 TABLET | Refills: 5 | Status: SHIPPED | OUTPATIENT
Start: 2020-11-09 | End: 2021-05-10

## 2020-11-10 ENCOUNTER — ANESTHESIA (OUTPATIENT)
Dept: PERIOP | Facility: HOSPITAL | Age: 40
End: 2020-11-10
Payer: COMMERCIAL

## 2020-11-10 ENCOUNTER — APPOINTMENT (OUTPATIENT)
Dept: RADIOLOGY | Facility: HOSPITAL | Age: 40
End: 2020-11-10
Payer: COMMERCIAL

## 2020-11-10 ENCOUNTER — HOSPITAL ENCOUNTER (OUTPATIENT)
Facility: HOSPITAL | Age: 40
Setting detail: OUTPATIENT SURGERY
Discharge: HOME | End: 2020-11-10
Attending: NEUROLOGICAL SURGERY | Admitting: NEUROLOGICAL SURGERY
Payer: COMMERCIAL

## 2020-11-10 VITALS
RESPIRATION RATE: 18 BRPM | DIASTOLIC BLOOD PRESSURE: 76 MMHG | HEIGHT: 62 IN | WEIGHT: 159 LBS | HEART RATE: 110 BPM | TEMPERATURE: 98.4 F | BODY MASS INDEX: 29.26 KG/M2 | SYSTOLIC BLOOD PRESSURE: 114 MMHG | OXYGEN SATURATION: 94 %

## 2020-11-10 VITALS — HEART RATE: 83 BPM

## 2020-11-10 DIAGNOSIS — M51.27 HERNIATED NUCLEUS PULPOSUS, L5-S1, RIGHT: ICD-10-CM

## 2020-11-10 DIAGNOSIS — M54.16 RADICULOPATHY, LUMBAR REGION: ICD-10-CM

## 2020-11-10 PROBLEM — R11.2 PONV (POSTOPERATIVE NAUSEA AND VOMITING): Status: ACTIVE | Noted: 2020-11-10

## 2020-11-10 PROBLEM — Z98.890 PONV (POSTOPERATIVE NAUSEA AND VOMITING): Status: ACTIVE | Noted: 2020-11-10

## 2020-11-10 LAB
EXT PREGNANCY TEST URINE: NEGATIVE
EXT. CONTROL: NORMAL

## 2020-11-10 PROCEDURE — 88304 TISSUE EXAM BY PATHOLOGIST: CPT | Performed by: PATHOLOGY

## 2020-11-10 PROCEDURE — 81025 URINE PREGNANCY TEST: CPT | Performed by: NEUROLOGICAL SURGERY

## 2020-11-10 PROCEDURE — 63030 LAMOT DCMPRN NRV RT 1 LMBR: CPT | Performed by: PHYSICIAN ASSISTANT

## 2020-11-10 PROCEDURE — 72020 X-RAY EXAM OF SPINE 1 VIEW: CPT

## 2020-11-10 PROCEDURE — 63030 LAMOT DCMPRN NRV RT 1 LMBR: CPT | Performed by: NEUROLOGICAL SURGERY

## 2020-11-10 RX ORDER — BUPIVACAINE HYDROCHLORIDE 2.5 MG/ML
INJECTION, SOLUTION EPIDURAL; INFILTRATION; INTRACAUDAL AS NEEDED
Status: DISCONTINUED | OUTPATIENT
Start: 2020-11-10 | End: 2020-11-10 | Stop reason: HOSPADM

## 2020-11-10 RX ORDER — HYDROMORPHONE HCL 110MG/55ML
PATIENT CONTROLLED ANALGESIA SYRINGE INTRAVENOUS AS NEEDED
Status: DISCONTINUED | OUTPATIENT
Start: 2020-11-10 | End: 2020-11-10

## 2020-11-10 RX ORDER — ROCURONIUM BROMIDE 10 MG/ML
INJECTION, SOLUTION INTRAVENOUS AS NEEDED
Status: DISCONTINUED | OUTPATIENT
Start: 2020-11-10 | End: 2020-11-10

## 2020-11-10 RX ORDER — GLYCOPYRROLATE 0.2 MG/ML
INJECTION INTRAMUSCULAR; INTRAVENOUS AS NEEDED
Status: DISCONTINUED | OUTPATIENT
Start: 2020-11-10 | End: 2020-11-10

## 2020-11-10 RX ORDER — METHYLPREDNISOLONE ACETATE 40 MG/ML
INJECTION, SUSPENSION INTRA-ARTICULAR; INTRALESIONAL; INTRAMUSCULAR; SOFT TISSUE AS NEEDED
Status: DISCONTINUED | OUTPATIENT
Start: 2020-11-10 | End: 2020-11-10 | Stop reason: HOSPADM

## 2020-11-10 RX ORDER — MIDAZOLAM HYDROCHLORIDE 2 MG/2ML
INJECTION, SOLUTION INTRAMUSCULAR; INTRAVENOUS AS NEEDED
Status: DISCONTINUED | OUTPATIENT
Start: 2020-11-10 | End: 2020-11-10

## 2020-11-10 RX ORDER — SODIUM CHLORIDE, SODIUM LACTATE, POTASSIUM CHLORIDE, CALCIUM CHLORIDE 600; 310; 30; 20 MG/100ML; MG/100ML; MG/100ML; MG/100ML
75 INJECTION, SOLUTION INTRAVENOUS CONTINUOUS
Status: DISCONTINUED | OUTPATIENT
Start: 2020-11-10 | End: 2020-11-10 | Stop reason: HOSPADM

## 2020-11-10 RX ORDER — OXYCODONE HYDROCHLORIDE 5 MG/1
5 TABLET ORAL EVERY 4 HOURS PRN
Status: DISCONTINUED | OUTPATIENT
Start: 2020-11-10 | End: 2020-11-10 | Stop reason: HOSPADM

## 2020-11-10 RX ORDER — FENTANYL CITRATE/PF 50 MCG/ML
25 SYRINGE (ML) INJECTION
Status: DISCONTINUED | OUTPATIENT
Start: 2020-11-10 | End: 2020-11-10 | Stop reason: HOSPADM

## 2020-11-10 RX ORDER — DEXAMETHASONE SODIUM PHOSPHATE 10 MG/ML
INJECTION, SOLUTION INTRAMUSCULAR; INTRAVENOUS AS NEEDED
Status: DISCONTINUED | OUTPATIENT
Start: 2020-11-10 | End: 2020-11-10

## 2020-11-10 RX ORDER — VANCOMYCIN HYDROCHLORIDE 1 G/200ML
1000 INJECTION, SOLUTION INTRAVENOUS ONCE
Status: COMPLETED | OUTPATIENT
Start: 2020-11-10 | End: 2020-11-10

## 2020-11-10 RX ORDER — ONDANSETRON 2 MG/ML
4 INJECTION INTRAMUSCULAR; INTRAVENOUS EVERY 4 HOURS PRN
Status: DISCONTINUED | OUTPATIENT
Start: 2020-11-10 | End: 2020-11-10 | Stop reason: HOSPADM

## 2020-11-10 RX ORDER — PROPOFOL 10 MG/ML
INJECTION, EMULSION INTRAVENOUS AS NEEDED
Status: DISCONTINUED | OUTPATIENT
Start: 2020-11-10 | End: 2020-11-10

## 2020-11-10 RX ORDER — SCOLOPAMINE TRANSDERMAL SYSTEM 1 MG/1
1 PATCH, EXTENDED RELEASE TRANSDERMAL ONCE
Status: DISCONTINUED | OUTPATIENT
Start: 2020-11-10 | End: 2020-11-10 | Stop reason: HOSPADM

## 2020-11-10 RX ORDER — PROPOFOL 10 MG/ML
INJECTION, EMULSION INTRAVENOUS CONTINUOUS PRN
Status: DISCONTINUED | OUTPATIENT
Start: 2020-11-10 | End: 2020-11-10

## 2020-11-10 RX ORDER — FENTANYL CITRATE 50 UG/ML
INJECTION, SOLUTION INTRAMUSCULAR; INTRAVENOUS AS NEEDED
Status: DISCONTINUED | OUTPATIENT
Start: 2020-11-10 | End: 2020-11-10

## 2020-11-10 RX ORDER — LIDOCAINE HYDROCHLORIDE AND EPINEPHRINE 10; 10 MG/ML; UG/ML
INJECTION, SOLUTION INFILTRATION; PERINEURAL AS NEEDED
Status: DISCONTINUED | OUTPATIENT
Start: 2020-11-10 | End: 2020-11-10 | Stop reason: HOSPADM

## 2020-11-10 RX ORDER — HYDROMORPHONE HCL/PF 1 MG/ML
0.5 SYRINGE (ML) INJECTION
Status: DISCONTINUED | OUTPATIENT
Start: 2020-11-10 | End: 2020-11-10 | Stop reason: HOSPADM

## 2020-11-10 RX ORDER — CHLORHEXIDINE GLUCONATE 0.12 MG/ML
15 RINSE ORAL ONCE
Status: COMPLETED | OUTPATIENT
Start: 2020-11-10 | End: 2020-11-10

## 2020-11-10 RX ORDER — ONDANSETRON 2 MG/ML
INJECTION INTRAMUSCULAR; INTRAVENOUS AS NEEDED
Status: DISCONTINUED | OUTPATIENT
Start: 2020-11-10 | End: 2020-11-10

## 2020-11-10 RX ADMIN — FENTANYL CITRATE 50 MCG: 50 INJECTION, SOLUTION INTRAMUSCULAR; INTRAVENOUS at 13:05

## 2020-11-10 RX ADMIN — MIDAZOLAM 2 MG: 1 INJECTION INTRAMUSCULAR; INTRAVENOUS at 13:05

## 2020-11-10 RX ADMIN — HYDROMORPHONE HYDROCHLORIDE 0.2 MG: 2 INJECTION INTRAMUSCULAR; INTRAVENOUS; SUBCUTANEOUS at 13:50

## 2020-11-10 RX ADMIN — PROPOFOL 200 MG: 10 INJECTION, EMULSION INTRAVENOUS at 13:22

## 2020-11-10 RX ADMIN — VANCOMYCIN HYDROCHLORIDE 1000 MG: 1 INJECTION, SOLUTION INTRAVENOUS at 12:26

## 2020-11-10 RX ADMIN — FENTANYL CITRATE 50 MCG: 50 INJECTION, SOLUTION INTRAMUSCULAR; INTRAVENOUS at 13:12

## 2020-11-10 RX ADMIN — GLYCOPYRROLATE 0.2 MG: 0.2 INJECTION, SOLUTION INTRAMUSCULAR; INTRAVENOUS at 13:04

## 2020-11-10 RX ADMIN — SCOPALAMINE 1 PATCH: 1 PATCH, EXTENDED RELEASE TRANSDERMAL at 11:56

## 2020-11-10 RX ADMIN — HYDROMORPHONE HYDROCHLORIDE 0.2 MG: 2 INJECTION INTRAMUSCULAR; INTRAVENOUS; SUBCUTANEOUS at 14:00

## 2020-11-10 RX ADMIN — HYDROMORPHONE HYDROCHLORIDE 0.4 MG: 2 INJECTION INTRAMUSCULAR; INTRAVENOUS; SUBCUTANEOUS at 13:43

## 2020-11-10 RX ADMIN — PROPOFOL 50 MG: 10 INJECTION, EMULSION INTRAVENOUS at 14:48

## 2020-11-10 RX ADMIN — HYDROMORPHONE HYDROCHLORIDE 0.2 MG: 2 INJECTION INTRAMUSCULAR; INTRAVENOUS; SUBCUTANEOUS at 14:35

## 2020-11-10 RX ADMIN — PROPOFOL 180 MCG/KG/MIN: 10 INJECTION, EMULSION INTRAVENOUS at 13:25

## 2020-11-10 RX ADMIN — SODIUM CHLORIDE, SODIUM LACTATE, POTASSIUM CHLORIDE, AND CALCIUM CHLORIDE 75 ML/HR: .6; .31; .03; .02 INJECTION, SOLUTION INTRAVENOUS at 11:36

## 2020-11-10 RX ADMIN — CHLORHEXIDINE GLUCONATE 0.12% ORAL RINSE 15 ML: 1.2 LIQUID ORAL at 11:31

## 2020-11-10 RX ADMIN — ROCURONIUM BROMIDE 40 MG: 10 INJECTION, SOLUTION INTRAVENOUS at 13:22

## 2020-11-10 RX ADMIN — FENTANYL CITRATE 50 MCG: 50 INJECTION, SOLUTION INTRAMUSCULAR; INTRAVENOUS at 13:30

## 2020-11-10 RX ADMIN — FENTANYL CITRATE 50 MCG: 50 INJECTION, SOLUTION INTRAMUSCULAR; INTRAVENOUS at 13:37

## 2020-11-10 RX ADMIN — PROPOFOL 40 MG: 10 INJECTION, EMULSION INTRAVENOUS at 14:38

## 2020-11-10 RX ADMIN — ONDANSETRON 4 MG: 2 INJECTION INTRAMUSCULAR; INTRAVENOUS at 14:39

## 2020-11-10 RX ADMIN — PROPOFOL 150 MCG/KG/MIN: 10 INJECTION, EMULSION INTRAVENOUS at 13:23

## 2020-11-10 RX ADMIN — DEXAMETHASONE SODIUM PHOSPHATE 8 MG: 10 INJECTION, SOLUTION INTRAMUSCULAR; INTRAVENOUS at 13:31

## 2020-11-11 ENCOUNTER — TELEPHONE (OUTPATIENT)
Dept: NEUROSURGERY | Facility: CLINIC | Age: 40
End: 2020-11-11

## 2020-11-18 ENCOUNTER — TELEPHONE (OUTPATIENT)
Dept: NEUROSURGERY | Facility: CLINIC | Age: 40
End: 2020-11-18

## 2020-11-23 ENCOUNTER — CLINICAL SUPPORT (OUTPATIENT)
Dept: NEUROSURGERY | Facility: CLINIC | Age: 40
End: 2020-11-23

## 2020-11-23 VITALS — SYSTOLIC BLOOD PRESSURE: 102 MMHG | DIASTOLIC BLOOD PRESSURE: 74 MMHG | TEMPERATURE: 98.6 F

## 2020-11-23 DIAGNOSIS — Z98.890 POST-OPERATIVE STATE: Primary | ICD-10-CM

## 2020-11-23 PROCEDURE — 99024 POSTOP FOLLOW-UP VISIT: CPT

## 2020-12-21 ENCOUNTER — OFFICE VISIT (OUTPATIENT)
Dept: NEUROSURGERY | Facility: CLINIC | Age: 40
End: 2020-12-21

## 2020-12-21 VITALS
HEART RATE: 120 BPM | SYSTOLIC BLOOD PRESSURE: 104 MMHG | HEIGHT: 62 IN | WEIGHT: 163 LBS | RESPIRATION RATE: 16 BRPM | DIASTOLIC BLOOD PRESSURE: 70 MMHG | BODY MASS INDEX: 30 KG/M2 | TEMPERATURE: 98.4 F

## 2020-12-21 DIAGNOSIS — Z48.89 AFTERCARE FOLLOWING SURGERY: Primary | ICD-10-CM

## 2020-12-21 PROCEDURE — 99024 POSTOP FOLLOW-UP VISIT: CPT | Performed by: NEUROLOGICAL SURGERY

## 2020-12-21 RX ORDER — CHOLECALCIFEROL (VITAMIN D3) 125 MCG
2000 CAPSULE ORAL DAILY
COMMUNITY

## 2020-12-28 ENCOUNTER — TELEPHONE (OUTPATIENT)
Dept: FAMILY MEDICINE CLINIC | Facility: HOSPITAL | Age: 40
End: 2020-12-28

## 2021-01-13 ENCOUNTER — TELEMEDICINE (OUTPATIENT)
Dept: FAMILY MEDICINE CLINIC | Facility: HOSPITAL | Age: 41
End: 2021-01-13
Payer: COMMERCIAL

## 2021-01-13 VITALS — WEIGHT: 163 LBS | BODY MASS INDEX: 30 KG/M2 | TEMPERATURE: 98.1 F | HEIGHT: 62 IN

## 2021-01-13 DIAGNOSIS — Z03.818 ENCOUNTER FOR OBSERVATION FOR SUSPECTED EXPOSURE TO OTHER BIOLOGICAL AGENTS RULED OUT: ICD-10-CM

## 2021-01-13 DIAGNOSIS — I10 BENIGN ESSENTIAL HTN: Primary | ICD-10-CM

## 2021-01-13 PROCEDURE — 3008F BODY MASS INDEX DOCD: CPT | Performed by: FAMILY MEDICINE

## 2021-01-13 PROCEDURE — 1036F TOBACCO NON-USER: CPT | Performed by: FAMILY MEDICINE

## 2021-01-13 PROCEDURE — 99214 OFFICE O/P EST MOD 30 MIN: CPT | Performed by: FAMILY MEDICINE

## 2021-01-13 PROCEDURE — 3725F SCREEN DEPRESSION PERFORMED: CPT | Performed by: FAMILY MEDICINE

## 2021-01-13 RX ORDER — OLMESARTAN MEDOXOMIL AND HYDROCHLOROTHIAZIDE 40/12.5 40; 12.5 MG/1; MG/1
1 TABLET ORAL DAILY
COMMUNITY
End: 2021-03-11

## 2021-01-13 NOTE — PROGRESS NOTES
COVID-19 Virtual Visit     Assessment/Plan:    Problem List Items Addressed This Visit        Cardiovascular and Mediastinum    Benign essential HTN - Primary    Relevant Medications    olmesartan-hydrochlorothiazide (BENICAR HCT) 40-12 5 MG per tablet      Other Visit Diagnoses     Encounter for observation for suspected exposure to other biological agents ruled out        Relevant Orders    Novel Coronavirus (COVID-19), PCR [facility] - Collected at [facility] or [facility]         Disposition:     I recommended COVID-19 PCR testing on or after day 5 since last exposure and if negative can end quarantine after 7 days  Patient was instructed to watch for symptoms until 14 days after exposure  If patient were to develop symptoms, they should immediately self isolate and call our office for further guidance  She will obtain covid testing  May return to work after PCT testing is negative which will be > 5 days after her potential exposure  HTN  Stable  Continue with BP monitoring at home  Monitor sytmpoms  To call f Bp is consistently low  I have spent 12 minutes directly with the patient  Greater than 50% of this time was spent in counseling/coordination of care regarding: risks and benefits of treatment options, instructions for management, importance of treatment compliance, risk factor reductions and impressions  Encounter provider Carina Mackey MD    Provider located at [address] [place] [address]  [address] [place] [address]    Recent Visits  Date Type Provider Dept   01/13/21 Telemedicine Carina Mackey MD [facility]   Showing recent visits within past 7 days and meeting all other requirements     Future Appointments  No visits were found meeting these conditions     Showing future appointments within next 150 days and meeting all other requirements      This virtual check-in was done via [employer] and patient was informed that this is not a secure, HIPAA-compliant platform  She agrees to proceed  Patient agrees to participate in a virtual check in via telephone or video visit instead of presenting to the office to address urgent/immediate medical needs  Patient is aware this is a billable service  After connecting through [facility], the patient was identified by name and date of birth  Concepción Valdivia was informed that this was a telemedicine visit and that the exam was being conducted confidentially over secure lines  My office door was closed  No one else was in the room  Concepción Valdivia acknowledged consent and understanding of privacy and security of the telemedicine visit  I informed the patient that I have reviewed her record in Epic and presented the opportunity for her to ask any questions regarding the visit today  The patient agreed to participate  Subjective:   Concepción Valdivia is a 38 y o  female who is concerned about COVID-19  Patient is currently asymptomatic  Patient denies fever, chills, fatigue, malaise, congestion, rhinorrhea, sore throat, anosmia, loss of taste, cough, shortness of breath, chest tightness, abdominal pain, nausea, vomiting, diarrhea, myalgias and headaches       Date of exposure: 1/8/2021    Exposure:   Contact with a person who is under investigation (PUI) for or who is positive for COVID-19 within the last 14 days?: Yes    Hospitalized recently for fever and/or lower respiratory symptoms?: No      Currently a healthcare worker that is involved in direct patient care?: No      Works in a special setting where the risk of COVID-19 transmission may be high? (this may include long-term care, correctional and alf facilities; homeless shelters; assisted-living facilities and group homes ): No      Resident in a special setting where the risk of COVID-19 transmission may be high? (this may include long-term care, correctional and alf facilities; homeless shelters; assisted-living facilities and group homes ): No      Patient is possibly exposed  This occurred last Friday, testing from potential covid exposure is not known yet  She overall is feeling fine however  Has had some sinus congestin and headache which is not new for her  HTN  Has been off of Depo provera now  BP has been lower  Had needed to lower her medications  Her Bp has been stable now  Lab Results   Component Value Date    SARSCOV2 Not Detected 2020     Past Medical History:   Diagnosis Date    Allergic 2013    Disease of thyroid gland 2002    Hypertension 2000    PONV (postoperative nausea and vomiting)      Past Surgical History:   Procedure Laterality Date     SECTION  ,     CHOLECYSTECTOMY  2014    LIVER BIOPSY      OOPHORECTOMY Right 2019    [place] LAMNOTMY INCL W/DCMPRSN NRV ROOT 1 INTRSPC LUMBR Right 11/10/2020    Procedure: LAMINECTOMY LUMBAR MINIMALLY INVASIVE, L5-S1, RIGHT;  Surgeon: Christiano Castañeda MD;  Location: [facility] OR;  Service: Neurosurgery     Current Outpatient Medications   Medication Sig Dispense Refill    Cholecalciferol (Vitamin D3) 50 MCG ( UT) TABS Take 2,000 Units by mouth daily      levothyroxine 88 mcg tablet TAKE 1 TABLET BY MOUTH ONCE DAILY IN THE MORNING ON AN EMPTY STOMACH 30 tablet 5    loratadine (CLARITIN) 10 mg tablet Take 10 mg by mouth daily      olmesartan-hydrochlorothiazide (BENICAR HCT) 40-12 5 MG per tablet Take 1 tablet by mouth daily      oxymetazoline (AFRIN) 0 05 % nasal spray into each nostril daily        No current facility-administered medications for this visit  Allergies   Allergen Reactions    Other Vomiting     General Anesthesia ETT 2019, had post op N/V    Penicillins Rash       Review of Systems   Constitutional: Negative for chills, fatigue and fever  HENT: Negative for congestion, rhinorrhea and sore throat  Respiratory: Negative for cough, chest tightness and shortness of breath  Gastrointestinal: Negative for abdominal pain, diarrhea, nausea and vomiting  Musculoskeletal: Negative for myalgias  Neurological: Negative for headaches  Objective:    Vitals:    01/13/21 1749   Temp: 98 1 °F (36 7 °C)   Weight: 73 9 kg (163 lb)   Height: 5' 2" (1 575 m)       Physical Exam  Constitutional:       Appearance: Normal appearance  She is not ill-appearing  Pulmonary:      Effort: Pulmonary effort is normal    Neurological:      Mental Status: She is alert and oriented to person, place, and time  Psychiatric:         Mood and Affect: Mood normal          Behavior: Behavior normal        VIRTUAL VISIT DISCLAIMER    Tamela Tay acknowledges that she has consented to an online visit or consultation  She understands that the online visit is based solely on information provided by her, and that, in the absence of a face-to-face physical evaluation by the physician, the diagnosis she receives is both limited and provisional in terms of accuracy and completeness  This is not intended to replace a full medical face-to-face evaluation by the physician  Tamela Tay understands and accepts these terms

## 2021-01-14 DIAGNOSIS — Z03.818 ENCOUNTER FOR OBSERVATION FOR SUSPECTED EXPOSURE TO OTHER BIOLOGICAL AGENTS RULED OUT: ICD-10-CM

## 2021-01-14 PROCEDURE — U0003 INFECTIOUS AGENT DETECTION BY NUCLEIC ACID (DNA OR RNA); SEVERE ACUTE RESPIRATORY SYNDROME CORONAVIRUS 2 (SARS-COV-2) (CORONAVIRUS DISEASE [COVID-19]), AMPLIFIED PROBE TECHNIQUE, MAKING USE OF HIGH THROUGHPUT TECHNOLOGIES AS DESCRIBED BY CMS-2020-01-R: HCPCS

## 2021-01-14 PROCEDURE — U0005 INFEC AGEN DETEC AMPLI PROBE: HCPCS

## 2021-01-15 LAB — SARS-COV-2 RNA RESP QL NAA+PROBE: NEGATIVE

## 2021-02-18 ENCOUNTER — TELEPHONE (OUTPATIENT)
Dept: FAMILY MEDICINE CLINIC | Facility: HOSPITAL | Age: 41
End: 2021-02-18

## 2021-02-18 DIAGNOSIS — Z20.822 CLOSE EXPOSURE TO COVID-19 VIRUS: Primary | ICD-10-CM

## 2021-02-18 DIAGNOSIS — R53.83 FATIGUE, UNSPECIFIED TYPE: ICD-10-CM

## 2021-02-18 NOTE — TELEPHONE ENCOUNTER
Patient called states she was exposed on Monday 02/15/21  to someone who tested positive  Patient would like to know if she can get an order to get tested? Patient states she is feeling fatigue but no other symptoms and [place]    Patient would also if she does get tested is there a testing site open on Saturday?     Gabriela Sudheer can be reached  [phone]

## 2021-02-19 DIAGNOSIS — Z20.822 EXPOSURE TO COVID-19 VIRUS: ICD-10-CM

## 2021-02-19 DIAGNOSIS — B34.9 VIRAL INFECTION, UNSPECIFIED: ICD-10-CM

## 2021-02-19 PROCEDURE — NC001 PR NO CHARGE: Performed by: FAMILY MEDICINE

## 2021-02-19 NOTE — PROGRESS NOTES
COVID-19 Virtual Visit     Assessment/Plan:    Problem List Items Addressed This Visit     None      Visit Diagnoses     Exposure to COVID-19 virus        Relevant Orders    Novel Coronavirus (Covid-19),PCR [facility] - Collected at [facility] or [facility]    Viral infection, unspecified        Relevant Orders    Novel Coronavirus (Covid-19),PCR [facility] - Collected at [facility] or [facility]         COVID-19 Plan     Encounter provider Lisa Finn MD    Provider located at [address] [place] [address]  [address] [place] [address]    Recent Visits  Date Type Provider Dept   21 Telephone [address] recent visits within past 7 days and meeting all other requirements     Future Appointments  No visits were found meeting these conditions     Showing future appointments within next 150 days and meeting all other requirements      COVID-19 HPI  Lab Results   Component Value Date    SARSCOV2 Negative 2021    SARSCOV2 Not Detected 2020     Past Medical History:   Diagnosis Date    Allergic 2013    Disease of thyroid gland 2002    Hypertension 2000    PONV (postoperative nausea and vomiting)      Past Surgical History:   Procedure Laterality Date     SECTION  ,     CHOLECYSTECTOMY  2014    LIVER BIOPSY      OOPHORECTOMY Right 2019    [place] LAMNOTMY INCL W/DCMPRSN NRV ROOT 1 INTRSPC LUMBR Right 11/10/2020    Procedure: LAMINECTOMY LUMBAR MINIMALLY INVASIVE, L5-S1, RIGHT;  Surgeon: Veronique Donald MD;  Location: [facility];  Service: Neurosurgery     Current Outpatient Medications   Medication Sig Dispense Refill    Cholecalciferol (Vitamin D3) 50 MCG (2000 UT) TABS Take 2,000 Units by mouth daily      levothyroxine 88 mcg tablet TAKE 1 TABLET BY MOUTH ONCE DAILY IN THE MORNING ON AN EMPTY STOMACH 30 tablet 5    loratadine (CLARITIN) 10 mg tablet Take 10 mg by mouth daily      olmesartan-hydrochlorothiazide (BENICAR HCT) 40-12 5 MG per tablet Take 1 tablet by mouth daily      oxymetazoline (AFRIN) 0 05 % nasal spray into each nostril daily        No current facility-administered medications for this visit  Allergies   Allergen Reactions    Other Vomiting     General Anesthesia ETT 8/30/2019, had post op N/V    Penicillins Rash       Review of Systems  Objective: There were no vitals filed for this visit  Physical Exam  VIRTUAL VISIT DISCLAIMER    Caro Lyle acknowledges that she has consented to an online visit or consultation  She understands that the online visit is based solely on information provided by her, and that, in the absence of a face-to-face physical evaluation by the physician, the diagnosis she receives is both limited and provisional in terms of accuracy and completeness  This is not intended to replace a full medical face-to-face evaluation by the physician  Caro Lyle understands and accepts these terms

## 2021-02-20 DIAGNOSIS — Z20.822 EXPOSURE TO COVID-19 VIRUS: ICD-10-CM

## 2021-02-20 DIAGNOSIS — B34.9 VIRAL INFECTION, UNSPECIFIED: ICD-10-CM

## 2021-02-20 PROCEDURE — U0003 INFECTIOUS AGENT DETECTION BY NUCLEIC ACID (DNA OR RNA); SEVERE ACUTE RESPIRATORY SYNDROME CORONAVIRUS 2 (SARS-COV-2) (CORONAVIRUS DISEASE [COVID-19]), AMPLIFIED PROBE TECHNIQUE, MAKING USE OF HIGH THROUGHPUT TECHNOLOGIES AS DESCRIBED BY CMS-2020-01-R: HCPCS | Performed by: FAMILY MEDICINE

## 2021-02-20 PROCEDURE — U0005 INFEC AGEN DETEC AMPLI PROBE: HCPCS | Performed by: FAMILY MEDICINE

## 2021-02-21 LAB — SARS-COV-2 RNA RESP QL NAA+PROBE: NEGATIVE

## 2021-02-23 ENCOUNTER — TELEPHONE (OUTPATIENT)
Dept: FAMILY MEDICINE CLINIC | Facility: HOSPITAL | Age: 41
End: 2021-02-23

## 2021-02-23 DIAGNOSIS — Z20.822 EXPOSURE TO COVID-19 VIRUS: Primary | ICD-10-CM

## 2021-02-23 NOTE — TELEPHONE ENCOUNTER
Patient was just tested on Saturday due to an exposure and was negative  Patients mom just tested positive today, her symptoms started Friday night  This would be a new COVID exposure for her  Mom lives in the in law suite but interacts with the family  Since her positive result she has been staying out of the main house   started with symptoms last night, chills, low grade fever and head congestion, aches and h/a  Stu Sharpe has no symptoms  Lisyeugenio Annemarie wants to know if she should be retested and when   [phone]

## 2021-02-23 NOTE — TELEPHONE ENCOUNTER
Order for COVID testing placed - be aware this is screening and may not be covered by insurance - can be approx $150 dollars, optimum day for screening is 5-7 days after most recent exposure exposure, be aware that [address] [place] [address] [address]

## 2021-02-25 ENCOUNTER — TELEPHONE (OUTPATIENT)
Dept: FAMILY MEDICINE CLINIC | Facility: HOSPITAL | Age: 41
End: 2021-02-25

## 2021-02-25 DIAGNOSIS — Z20.822 EXPOSURE TO COVID-19 VIRUS: ICD-10-CM

## 2021-02-25 PROCEDURE — U0005 INFEC AGEN DETEC AMPLI PROBE: HCPCS | Performed by: INTERNAL MEDICINE

## 2021-02-25 PROCEDURE — U0003 INFECTIOUS AGENT DETECTION BY NUCLEIC ACID (DNA OR RNA); SEVERE ACUTE RESPIRATORY SYNDROME CORONAVIRUS 2 (SARS-COV-2) (CORONAVIRUS DISEASE [COVID-19]), AMPLIFIED PROBE TECHNIQUE, MAKING USE OF HIGH THROUGHPUT TECHNOLOGIES AS DESCRIBED BY CMS-2020-01-R: HCPCS | Performed by: INTERNAL MEDICINE

## 2021-02-25 NOTE — TELEPHONE ENCOUNTER
Patient w/ previous household covid+ exposure, now w/ covid + spouse  She is now feeling like she may have symptoms as well  Asking when she should be tested      pcb

## 2021-02-26 LAB — SARS-COV-2 RNA RESP QL NAA+PROBE: POSITIVE

## 2021-02-28 ENCOUNTER — OFFICE VISIT (OUTPATIENT)
Dept: URGENT CARE | Facility: CLINIC | Age: 41
End: 2021-02-28
Payer: COMMERCIAL

## 2021-02-28 ENCOUNTER — APPOINTMENT (OUTPATIENT)
Dept: RADIOLOGY | Facility: CLINIC | Age: 41
End: 2021-02-28
Payer: COMMERCIAL

## 2021-02-28 ENCOUNTER — NURSE TRIAGE (OUTPATIENT)
Dept: OTHER | Facility: OTHER | Age: 41
End: 2021-02-28

## 2021-02-28 ENCOUNTER — HOSPITAL ENCOUNTER (EMERGENCY)
Facility: HOSPITAL | Age: 41
Discharge: HOME | End: 2021-02-28
Attending: EMERGENCY MEDICINE | Admitting: EMERGENCY MEDICINE
Payer: COMMERCIAL

## 2021-02-28 VITALS
WEIGHT: 158 LBS | DIASTOLIC BLOOD PRESSURE: 64 MMHG | RESPIRATION RATE: 20 BRPM | SYSTOLIC BLOOD PRESSURE: 82 MMHG | TEMPERATURE: 102.2 F | HEART RATE: 124 BPM | BODY MASS INDEX: 29.08 KG/M2 | OXYGEN SATURATION: 97 % | HEIGHT: 62 IN

## 2021-02-28 VITALS
BODY MASS INDEX: 29.08 KG/M2 | HEIGHT: 62 IN | TEMPERATURE: 99.4 F | RESPIRATION RATE: 20 BRPM | SYSTOLIC BLOOD PRESSURE: 125 MMHG | OXYGEN SATURATION: 98 % | WEIGHT: 158 LBS | DIASTOLIC BLOOD PRESSURE: 73 MMHG | HEART RATE: 114 BPM

## 2021-02-28 DIAGNOSIS — E87.6 HYPOKALEMIA: ICD-10-CM

## 2021-02-28 DIAGNOSIS — J12.82 PNEUMONIA DUE TO 2019 NOVEL CORONAVIRUS: Primary | ICD-10-CM

## 2021-02-28 DIAGNOSIS — U07.1 COVID-19: ICD-10-CM

## 2021-02-28 DIAGNOSIS — R55 SYNCOPE: Primary | ICD-10-CM

## 2021-02-28 DIAGNOSIS — U07.1 PNEUMONIA DUE TO 2019 NOVEL CORONAVIRUS: Primary | ICD-10-CM

## 2021-02-28 DIAGNOSIS — J12.82 PNEUMONIA DUE TO 2019 NOVEL CORONAVIRUS: ICD-10-CM

## 2021-02-28 DIAGNOSIS — U07.1 PNEUMONIA DUE TO 2019 NOVEL CORONAVIRUS: ICD-10-CM

## 2021-02-28 LAB
ALBUMIN SERPL BCP-MCNC: 3.8 G/DL (ref 3.5–5)
ALP SERPL-CCNC: 76 U/L (ref 46–116)
ALT SERPL W P-5'-P-CCNC: 24 U/L (ref 12–78)
ANION GAP SERPL CALCULATED.3IONS-SCNC: 11 MMOL/L (ref 4–13)
AST SERPL W P-5'-P-CCNC: 26 U/L (ref 5–45)
BASOPHILS # BLD AUTO: 0.01 THOUSANDS/ΜL (ref 0–0.1)
BASOPHILS NFR BLD AUTO: 0 % (ref 0–1)
BILIRUB SERPL-MCNC: 0.3 MG/DL (ref 0.2–1)
BUN SERPL-MCNC: 6 MG/DL (ref 5–25)
CALCIUM SERPL-MCNC: 9 MG/DL (ref 8.3–10.1)
CHLORIDE SERPL-SCNC: 100 MMOL/L (ref 100–108)
CO2 SERPL-SCNC: 27 MMOL/L (ref 21–32)
CREAT SERPL-MCNC: 0.76 MG/DL (ref 0.6–1.3)
D DIMER PPP FEU-MCNC: 0.37 UG/ML FEU
EOSINOPHIL # BLD AUTO: 0 THOUSAND/ΜL (ref 0–0.61)
EOSINOPHIL NFR BLD AUTO: 0 % (ref 0–6)
ERYTHROCYTE [DISTWIDTH] IN BLOOD BY AUTOMATED COUNT: 12.1 % (ref 11.6–15.1)
GFR SERPL CREATININE-BSD FRML MDRD: 97 ML/MIN/1.73SQ M
GLUCOSE SERPL-MCNC: 98 MG/DL (ref 65–140)
HCT VFR BLD AUTO: 39.6 % (ref 34.8–46.1)
HGB BLD-MCNC: 13.3 G/DL (ref 11.5–15.4)
IMM GRANULOCYTES # BLD AUTO: 0 THOUSAND/UL (ref 0–0.2)
IMM GRANULOCYTES NFR BLD AUTO: 0 % (ref 0–2)
LYMPHOCYTES # BLD AUTO: 0.69 THOUSANDS/ΜL (ref 0.6–4.47)
LYMPHOCYTES NFR BLD AUTO: 30 % (ref 14–44)
MCH RBC QN AUTO: 30 PG (ref 26.8–34.3)
MCHC RBC AUTO-ENTMCNC: 33.6 G/DL (ref 31.4–37.4)
MCV RBC AUTO: 89 FL (ref 82–98)
MONOCYTES # BLD AUTO: 0.17 THOUSAND/ΜL (ref 0.17–1.22)
MONOCYTES NFR BLD AUTO: 8 % (ref 4–12)
NEUTROPHILS # BLD AUTO: 1.41 THOUSANDS/ΜL (ref 1.85–7.62)
NEUTS SEG NFR BLD AUTO: 62 % (ref 43–75)
NRBC BLD AUTO-RTO: 0 /100 WBCS
PLATELET # BLD AUTO: 151 THOUSANDS/UL (ref 149–390)
PMV BLD AUTO: 10.7 FL (ref 8.9–12.7)
POTASSIUM SERPL-SCNC: 3.1 MMOL/L (ref 3.5–5.3)
PROT SERPL-MCNC: 7.7 G/DL (ref 6.4–8.2)
RBC # BLD AUTO: 4.43 MILLION/UL (ref 3.81–5.12)
SODIUM SERPL-SCNC: 138 MMOL/L (ref 136–145)
TROPONIN I SERPL-MCNC: <0.02 NG/ML
WBC # BLD AUTO: 2.28 THOUSAND/UL (ref 4.31–10.16)

## 2021-02-28 PROCEDURE — 36415 COLL VENOUS BLD VENIPUNCTURE: CPT | Performed by: EMERGENCY MEDICINE

## 2021-02-28 PROCEDURE — 85379 FIBRIN DEGRADATION QUANT: CPT | Performed by: EMERGENCY MEDICINE

## 2021-02-28 PROCEDURE — 99285 EMERGENCY DEPT VISIT HI MDM: CPT | Performed by: EMERGENCY MEDICINE

## 2021-02-28 PROCEDURE — 71046 X-RAY EXAM CHEST 2 VIEWS: CPT

## 2021-02-28 PROCEDURE — 96360 HYDRATION IV INFUSION INIT: CPT

## 2021-02-28 PROCEDURE — 99283 EMERGENCY DEPT VISIT LOW MDM: CPT | Performed by: PREVENTIVE MEDICINE

## 2021-02-28 PROCEDURE — G0382 LEV 3 HOSP TYPE B ED VISIT: HCPCS | Performed by: PREVENTIVE MEDICINE

## 2021-02-28 PROCEDURE — 99284 EMERGENCY DEPT VISIT MOD MDM: CPT

## 2021-02-28 PROCEDURE — 85025 COMPLETE CBC W/AUTO DIFF WBC: CPT | Performed by: EMERGENCY MEDICINE

## 2021-02-28 PROCEDURE — 84484 ASSAY OF TROPONIN QUANT: CPT | Performed by: EMERGENCY MEDICINE

## 2021-02-28 PROCEDURE — 93005 ELECTROCARDIOGRAM TRACING: CPT

## 2021-02-28 PROCEDURE — 80053 COMPREHEN METABOLIC PANEL: CPT | Performed by: EMERGENCY MEDICINE

## 2021-02-28 RX ORDER — ACETAMINOPHEN 325 MG/1
650 TABLET ORAL ONCE
Status: DISCONTINUED | OUTPATIENT
Start: 2021-02-28 | End: 2021-07-12 | Stop reason: HOSPADM

## 2021-02-28 RX ORDER — POTASSIUM CHLORIDE 20 MEQ/1
40 TABLET, EXTENDED RELEASE ORAL ONCE
Status: COMPLETED | OUTPATIENT
Start: 2021-02-28 | End: 2021-02-28

## 2021-02-28 RX ORDER — LORAZEPAM 0.5 MG/1
0.5 TABLET ORAL ONCE
Status: COMPLETED | OUTPATIENT
Start: 2021-02-28 | End: 2021-02-28

## 2021-02-28 RX ORDER — AZITHROMYCIN 250 MG/1
TABLET, FILM COATED ORAL
Qty: 6 TABLET | Refills: 0 | Status: SHIPPED | OUTPATIENT
Start: 2021-02-28 | End: 2021-03-04

## 2021-02-28 RX ORDER — CLORAZEPATE DIPOTASSIUM 3.75 MG/1
TABLET ORAL
Qty: 6 TABLET | Refills: 0 | Status: SHIPPED | OUTPATIENT
Start: 2021-02-28 | End: 2021-03-01

## 2021-02-28 RX ADMIN — POTASSIUM CHLORIDE 40 MEQ: 1500 TABLET, EXTENDED RELEASE ORAL at 13:30

## 2021-02-28 RX ADMIN — SODIUM CHLORIDE 1000 ML: 0.9 INJECTION, SOLUTION INTRAVENOUS at 12:38

## 2021-02-28 RX ADMIN — LORAZEPAM 0.5 MG: 0.5 TABLET ORAL at 12:39

## 2021-02-28 NOTE — TELEPHONE ENCOUNTER
Patient was diagnosed with Covid, "whole house it except 1 daughter"  Patient had to take her mom to the ER and mother is currently admitted and is ICU  Patient has been having intermittent fever since Thursday and has pulling/tightning feeling at the base of her throat when she tries to take a deep breath  Patient stated that she wasn't sure if it was anxiety "making it worse or not but it isn't improving " Current Pulse ox is 98% and HR is 130  Patient stated that she does feel her heart is "racing " Patient also has a low grade temp of 99 3  Based on Symptoms, Sent TT to Dr Tj Alexandra to discuss  Dr Tj Alexandra called back and we discussed  Based on symptoms of elevated HR and feeling of tightness in pt's throat, Dr Tj Alexandra recommended that patient be evaluated  At a [facility] location  Called patient back and we discussed  Patient agreeable  I called ahead to the [facility] in [place] and spoke with the PA, [place]  [facility] stated, "We were told that any patient who was covid positive and needs to be evaluated, needs to go to our [facility] location " I called patient back and informed her that I was informed that patient had to be seen at a different location  Patient still agreeable and stated that she would go to [facility] location  I called [facility] and spoke with Janeth Wu who stated that they would be awaiting her arrival  I did inform her that patient is aware to call from the parking lot and someone would come out to get her when it was her time for evaluation

## 2021-02-28 NOTE — PROGRESS NOTES
[facility]        NAME: Suzette Abreu is a 42 y o  female  : 1980    MRN: [id number]  DATE: 2021  TIME: 10:48 AM    Assessment and Plan   Pneumonia due to 2019 novel coronavirus [U07 1, J12 82]  1  Pneumonia due to 2019 novel coronavirus           Patient Instructions       Follow up with PCP in 3-5 days  Proceed to  ER if symptoms worsen  Chief Complaint     Chief Complaint   Patient presents with   Yissel Almeida [id number]     Dx'd [id number] + on 21  States almost whole family has Covid and Mother just transferred to ICU last night due to Covid-related complications  Feels as if heart is racing and "not sure if related to anxiety " Has had ongoing c/o sensation of having to cough at end of deep inspiration and on and off low grade temp  Pt seen following call from [address] [place] [address] re pt's sx and anxiety  History of Present Illness        Her family has COVID and her mother was transferred to the ICU last night  She found out she has positive COVID on Friday  She is not short of breath but she feels as if her heart is racing and she feels anxious and weak  Review of Systems   Review of Systems   HENT: Positive for congestion  Respiratory: Negative for cough, choking, chest tightness, shortness of breath and wheezing  Cardiovascular: Positive for palpitations           Current Medications       Current Outpatient Medications:     Cholecalciferol (Vitamin D3) 50 MCG ( UT) TABS, Take 2,000 Units by mouth daily, Disp: , Rfl:     levothyroxine 88 mcg tablet, TAKE 1 TABLET BY MOUTH ONCE DAILY IN THE MORNING ON AN EMPTY STOMACH, Disp: 30 tablet, Rfl: 5    loratadine (CLARITIN) 10 mg tablet, Take 10 mg by mouth daily, Disp: , Rfl:     olmesartan-hydrochlorothiazide (BENICAR HCT) 40-12 5 MG per tablet, Take 1 tablet by mouth daily, Disp: , Rfl:     oxymetazoline (AFRIN) 0 05 % nasal spray, into each nostril daily , Disp: , Rfl:     Current Allergies     Allergies as of 2021 - Reviewed 2021   Allergen Reaction Noted    Other Vomiting 2020    Penicillins Rash 2019            The following portions of the patient's history were reviewed and updated as appropriate: allergies, current medications, past family history, past medical history, past social history, past surgical history and problem list      Past Medical History:   Diagnosis Date    Allergic 2013    Disease of thyroid gland 2002    Hypertension 2000    PONV (postoperative nausea and vomiting)        Past Surgical History:   Procedure Laterality Date     SECTION  ,     CHOLECYSTECTOMY  2014    LIVER BIOPSY      OOPHORECTOMY Right 2019    [place] LAMNOTMY INCL W/DCMPRSN NRV ROOT 1 INTRSPC LUMBR Right 11/10/2020    Procedure: LAMINECTOMY LUMBAR MINIMALLY INVASIVE, L5-S1, RIGHT;  Surgeon: Lizz Mena MD;  Location:  MAIN OR;  Service: Neurosurgery       Family History   Problem Relation Age of Onset    Arthritis Mother     Arthritis Maternal Grandmother     Parkinsonism Maternal Grandmother     Psoriasis Daughter     No Known Problems Father     No Known Problems Maternal Grandfather     No Known Problems Paternal Grandmother     No Known Problems Paternal Grandfather     No Known Problems Maternal Aunt          Medications have been verified  Objective   Pulse (!) 134   Temp (!) 101 7 °F (38 7 °C)   Resp 20   Ht 5' 2" (1 575 m)   Wt 71 7 kg (158 lb)   SpO2 98%   BMI 28 90 kg/m²   No LMP recorded  Physical Exam     Physical Exam  Constitutional:       General: She is not in acute distress  Appearance: She is not ill-appearing, toxic-appearing or diaphoretic  HENT:      Right Ear: Tympanic membrane normal       Left Ear: Tympanic membrane normal       Mouth/Throat:      Mouth: Mucous membranes are moist       Pharynx: Oropharynx is clear  Cardiovascular:      Heart sounds: Normal heart sounds        Comments: Tachycardia 134  Pulmonary:      Comments: Very questionable fine dry rales left deep lateral base  Neurological:      Mental Status: She is alert           Chest x-ray, questionable  Changes left lateral base

## 2021-02-28 NOTE — TELEPHONE ENCOUNTER
Reason for Disposition   [1] Symptoms of anxiety or panic AND [2] has not been evaluated for this by physician    Answer Assessment - Initial Assessment Questions  1  CONCERN: "What happened that made you call today?"      "I was diagnosed with Covid  My whole house has it  I had to take my mom to the ER and she's in ICU "  2  ANXIETY SYMPTOM SCREENING: "Can you describe how you have been feeling?"  (e g , tense, restless, panicky, anxious, keyed up, trouble sleeping, trouble concentrating)      "Well since I have Covid, I feel as though the bottom of my throat feels tight and it's pulling  When I try to take a deep breath, it makes me cough  My heart is also racing " Pt has a pulse Pulse ox O2 on RA is 98,   3  ONSET: "How long have you been feeling this way?"      "Since we got sick and it's been worse since I brought my mom to the ER "  4  RECURRENT: "Have you felt this way before?"  If yes: "What happened that time?" "What helped these feelings go away in the past?"       "I have a history of anxiety but I haven't taken anything in a very long tome for it "  5  RISK OF HARM - SUICIDAL IDEATION:  "Do you ever have thoughts of hurting or killing yourself?"  (e g , yes, no, no but preoccupation with thoughts about death)    - INTENT:  "Do you have thoughts of hurting or killing yourself right NOW?" (e g , yes, no, N/A)    - PLAN: "Do you have a specific plan for how you would do this?" (e g , gun, knife, overdose, no plan, N/A)      Denies  6  RISK OF HARM - HOMICIDAL IDEATION:  "Do you ever have thoughts of hurting or killing someone else?"  (e g , yes, no, no but preoccupation with thoughts about death)    - INTENT:  "Do you have thoughts of hurting or killing someone right NOW?" (e g , yes, no, N/A)    - PLAN: "Do you have a specific plan for how you would do this?" (e g , gun, knife, no plan, N/A)       Denies  7  FUNCTIONAL IMPAIRMENT: "How have things been going for you overall in your life?  Have you had any more difficulties than usual doing your normal daily activities?"  (e g , better, same, worse; self-care, school, work, interactions)      "I feel stressed because we're all sick except my one daughter and my mom is in the ICU "  8  SUPPORT: "Who is with you now?" "Who do you live with?" "Do you have family or friends nearby who you can talk to?"       "My family since we're all quarantined "  5  THERAPIST: "Do you have a counselor or therapist? Name?"      NO  10  STRESSORS: "Has there been any new stress or recent changes in your life?"        Covid and mom being in the ICU due to Covid  11  CAFFEINE ABUSE: "Do you drink caffeinated beverages, and how much each day?" (e g , coffee, tea, nestor)        Denies  12  SUBSTANCE ABUSE: "Do you use any illegal drugs or alcohol?"        Denies  13  OTHER SYMPTOMS: "Do you have any other physical symptoms right now?" (e g , chest pain, palpitations, difficulty breathing, fever)        No chest pain or difficulty breathing but feels as though throat is tight and heart is racing    14  PREGNANCY: "Is there any chance you are pregnant?" "When was your last menstrual period?"        Denies    Protocols used: ANXIETY AND PANIC ATTACK-ADULT-

## 2021-02-28 NOTE — TELEPHONE ENCOUNTER
Regarding: Anxiety  ----- Message from Denver Riedel sent at 2/28/2021  8:36 AM EST -----  "I have a fever on and off still, have bad sinus pressure and it hurts my throat when I take a deep breath and I am very anxious " Tested positive 2/25

## 2021-02-28 NOTE — ED NOTES
Patient calling  for ride home, patient to remain in room until  arrives      [facility]  02/28/21 8058

## 2021-02-28 NOTE — ED PROVIDER NOTES
History  Chief Complaint   Patient presents with    Syncope     patient presents to the ED via EMS from urgent care after a syncopal episode, dx with CIVD on friday, states she has been having anxiety and generalized weakness      76-year-old female presents via EMS from urgent care for evaluation of syncope in the setting of a recent COVID diagnosis  The patient was diagnosed with COVID 3 days ago and has had symptoms for 4-5 days  Multiple family members are sick with Annabelle her mother is currently hospitalized with COVID  Patient states that she has discomfort in her upper chest with deep inspiration  She denies any other specific chest pain  Patient also has associated anxiety  The patient reportedly had a syncopal event in the office after chest x-ray  She was noted have some mild low blood pressure  Prior to Admission Medications   Prescriptions Last Dose Informant Patient Reported? Taking?    Cholecalciferol (Vitamin D3) 50 MCG (2000 UT) TABS   Yes No   Sig: Take 2,000 Units by mouth daily   azithromycin (ZITHROMAX) 250 mg tablet   No No   Sig: Take 2 tablets today then 1 tablet daily x 4 days   clorazepate (TRANXENE) 3 75 mg tablet   No No   Sig: One tablet every 4-6 hours as needed for anxiety   levothyroxine 88 mcg tablet   No No   Sig: TAKE 1 TABLET BY MOUTH ONCE DAILY IN THE MORNING ON AN EMPTY STOMACH   loratadine (CLARITIN) 10 mg tablet  Self Yes No   Sig: Take 10 mg by mouth daily   olmesartan-hydrochlorothiazide (BENICAR HCT) 40-12 5 MG per tablet   Yes No   Sig: Take 1 tablet by mouth daily   oxymetazoline (AFRIN) 0 05 % nasal spray  Self Yes No   Sig: into each nostril daily       Facility-Administered Medications Last Administration Doses Remaining   acetaminophen (TYLENOL) tablet 650 mg None recorded 1          Past Medical History:   Diagnosis Date    Allergic 2013    Disease of thyroid gland 2002    Hypertension 2000    PONV (postoperative nausea and vomiting)        Past Surgical History:   Procedure Laterality Date     SECTION  ,     CHOLECYSTECTOMY  2014    LIVER BIOPSY      OOPHORECTOMY Right 2019    SD LAMNOTMY INCL W/DCMPRSN NRV ROOT 1 INTRSPC LUMBR Right 11/10/2020    Procedure: LAMINECTOMY LUMBAR MINIMALLY INVASIVE, L5-S1, RIGHT;  Surgeon: Veronique Donald MD;  Location:  MAIN OR;  Service: Neurosurgery       Family History   Problem Relation Age of Onset    Arthritis Mother     Arthritis Maternal Grandmother     Parkinsonism Maternal Grandmother     Psoriasis Daughter     No Known Problems Father     No Known Problems Maternal Grandfather     No Known Problems Paternal Grandmother     No Known Problems Paternal Grandfather     No Known Problems Maternal Aunt      I have reviewed and agree with the history as documented  E-Cigarette/Vaping    E-Cigarette Use Never User      E-Cigarette/Vaping Substances     Social History     Tobacco Use    Smoking status: Never Smoker    Smokeless tobacco: Never Used   Substance Use Topics    Alcohol use: Not Currently     Alcohol/week: 1 0 standard drinks     Types: 1 Standard drinks or equivalent per week    Drug use: Never       Review of Systems   Constitutional: Positive for fever  Negative for chills and fatigue  HENT: Negative for sore throat  Eyes: Negative for visual disturbance  Respiratory: Positive for cough and shortness of breath  Cardiovascular: Negative for chest pain  Gastrointestinal: Negative for abdominal pain, diarrhea, nausea and vomiting  Genitourinary: Negative for difficulty urinating, dysuria and pelvic pain  Musculoskeletal: Negative for back pain  Skin: Negative for rash  Neurological: Positive for syncope  Negative for weakness and headaches  Psychiatric/Behavioral: The patient is nervous/anxious  All other systems reviewed and are negative  Physical Exam  Physical Exam  Vitals signs and nursing note reviewed     Constitutional:       General: She is not in acute distress  Appearance: She is well-developed  HENT:      Head: Normocephalic and atraumatic  Right Ear: External ear normal       Left Ear: External ear normal    Eyes:      General: No scleral icterus  Conjunctiva/sclera: Conjunctivae normal       Pupils: Pupils are equal, round, and reactive to light  Neck:      Musculoskeletal: Normal range of motion  Cardiovascular:      Rate and Rhythm: Regular rhythm  Tachycardia present  Heart sounds: Normal heart sounds  Pulmonary:      Effort: Pulmonary effort is normal  No respiratory distress  Breath sounds: Normal breath sounds  Abdominal:      General: Bowel sounds are normal       Palpations: Abdomen is soft  Tenderness: There is no abdominal tenderness  There is no guarding or rebound  Musculoskeletal: Normal range of motion  Skin:     General: Skin is warm and dry  Findings: No rash  Neurological:      Mental Status: She is alert and oriented to person, place, and time  Psychiatric:         Mood and Affect: Mood is anxious           Vital Signs  ED Triage Vitals [02/28/21 1155]   Temperature Pulse Respirations Blood Pressure SpO2   99 4 °F (37 4 °C) (!) 114 20 125/73 98 %      Temp Source Heart Rate Source Patient Position - Orthostatic VS BP Location FiO2 (%)   Tympanic Monitor Sitting Right arm --      Pain Score       --           Vitals:    02/28/21 1155   BP: 125/73   Pulse: (!) 114   Patient Position - Orthostatic VS: Sitting         Visual Acuity  Visual Acuity      Most Recent Value   L Pupil Size (mm)  3   R Pupil Size (mm)  3          ED Medications  Medications   LORazepam (ATIVAN) tablet 0 5 mg (0 5 mg Oral Given 2/28/21 1239)   sodium chloride 0 9 % bolus 1,000 mL (0 mL Intravenous Stopped 2/28/21 1331)   potassium chloride (K-DUR,KLOR-CON) CR tablet 40 mEq (40 mEq Oral Given 2/28/21 1330)       Diagnostic Studies  Results Reviewed     Procedure Component Value Units Date/Time D-Dimer [[id number]]  (Normal) Collected: 02/28/21 1239    Lab Status: Final result Specimen: Blood from Arm, Right Updated: 02/28/21 1303     D-Dimer, Quant 0 37 ug/ml FEU     Troponin I [[id number]]  (Normal) Collected: 02/28/21 1214    Lab Status: Final result Specimen: Blood from Arm, Right Updated: 02/28/21 1244     Troponin I <0 02 ng/mL     Comprehensive metabolic panel [[id number]]  (Abnormal) Collected: 02/28/21 1214    Lab Status: Final result Specimen: Blood from Arm, Right Updated: 02/28/21 1242     Sodium 138 mmol/L      Potassium 3 1 mmol/L      Chloride 100 mmol/L      CO2 27 mmol/L      ANION GAP 11 mmol/L      BUN 6 mg/dL      Creatinine 0 76 mg/dL      Glucose 98 mg/dL      Calcium 9 0 mg/dL      AST 26 U/L      ALT 24 U/L      Alkaline Phosphatase 76 U/L      Total Protein 7 7 g/dL      Albumin 3 8 g/dL      Total Bilirubin 0 30 mg/dL      eGFR 97 ml/min/1 73sq m     Narrative:      [facility] guidelines for Chronic Kidney Disease (CKD):     Stage 1 with normal or high GFR (GFR > 90 mL/min/1 73 square meters)    Stage 2 Mild CKD (GFR = 60-89 mL/min/1 73 square meters)    Stage 3A Moderate CKD (GFR = 45-59 mL/min/1 73 square meters)    Stage 3B Moderate CKD (GFR = 30-44 mL/min/1 73 square meters)    Stage 4 Severe CKD (GFR = 15-29 mL/min/1 73 square meters)    Stage 5 End Stage CKD (GFR <15 mL/min/1 73 square meters)  Note: GFR calculation is accurate only with a steady state creatinine    CBC and differential [[id number]]  (Abnormal) Collected: 02/28/21 1214    Lab Status: Final result Specimen: Blood from Arm, Right Updated: 02/28/21 1221     WBC 2 28 Thousand/uL      RBC 4 43 Million/uL      Hemoglobin 13 3 g/dL      Hematocrit 39 6 %      MCV 89 fL      MCH 30 0 pg      MCHC 33 6 g/dL      RDW 12 1 %      MPV 10 7 fL      Platelets 634 Thousands/uL      nRBC 0 /100 WBCs      Neutrophils Relative 62 %      Immat GRANS % 0 %      Lymphocytes Relative 30 %      Monocytes Relative 8 %      Eosinophils Relative 0 %      Basophils Relative 0 %      Neutrophils Absolute 1 41 Thousands/µL      Immature Grans Absolute 0 00 Thousand/uL      Lymphocytes Absolute 0 69 Thousands/µL      Monocytes Absolute 0 17 Thousand/µL      Eosinophils Absolute 0 00 Thousand/µL      Basophils Absolute 0 01 Thousands/µL                  No orders to display              Procedures  ECG 12 Lead Documentation Only    Date/Time: 2/28/2021 12:21 PM  Performed by: Oli Huertas DO  Authorized by: Oli Huertas DO     Indications / Diagnosis:  Shortness of breath  ECG reviewed by me, the ED Provider: yes    Patient location:  ED  Previous ECG:     Previous ECG:  Unavailable  Interpretation:     Interpretation: normal    Rate:     ECG rate:  108    ECG rate assessment: normal    Rhythm:     Rhythm: sinus tachycardia    Ectopy:     Ectopy: none    QRS:     QRS axis:  Normal    QRS intervals:  Normal  Conduction:     Conduction: normal    ST segments:     ST segments:  Normal  T waves:     T waves: normal               ED Course  ED Course as of Feb 28 1516   Sun Feb 28, 2021   1213 There is no read on the chest x-ray from urgent care; however, my interpretation is central airway inflammation                                SBIRT 20yo+      Most Recent Value   SBIRT (23 yo +)   In order to provide better care to our patients, we are screening all of our patients for alcohol and drug use  Would it be okay to ask you these screening questions? Yes Filed at: 02/28/2021 1201   Initial Alcohol Screen: US AUDIT-C    1  How often do you have a drink containing alcohol?  0 Filed at: 02/28/2021 1201   2  How many drinks containing alcohol do you have on a typical day you are drinking? 0 Filed at: 02/28/2021 1201   3a  Male UNDER 65: How often do you have five or more drinks on one occasion? 0 Filed at: 02/28/2021 1201   3b  FEMALE Any Age, or MALE 65+: How often do you have 4 or more drinks on one occassion?   0 Filed at: 02/28/2021 1201   Audit-C Score  0 Filed at: 02/28/2021 1201   SEPIDEH: How many times in the past year have you    Used an illegal drug or used a prescription medication for non-medical reasons? Never Filed at: 02/28/2021 1201                    [facility]  Number of Diagnoses or Management Options  COVID-19:   Hypokalemia:   Syncope:   Diagnosis management comments: Differential diagnosis:  COVID pneumonia, dehydration, electrolyte disturbance, pulmonary embolism, arrhythmia doubt acute coronary syndrome  Plan is to obtain EKG, laboratories and review the chest x-ray from urgent care    Patient without arrhythmia on examination here  D-dimer negative  Patient low risk for acute coronary syndrome  Doubt neurologic her central cardiac cause of syncope  The patient (and any family present) verbalized understanding of the discharge instructions and warnings that would necessitate return to the Emergency Department  All questions were answered prior to discharge  Amount and/or Complexity of Data Reviewed  Clinical lab tests: reviewed  Review and summarize past medical records: yes  Independent visualization of images, tracings, or specimens: yes        Disposition  Final diagnoses:   Syncope   COVID-19   Hypokalemia     Time reflects when diagnosis was documented in both MDM as applicable and the Disposition within this note     Time User Action Codes Description Comment    2/28/2021  1:23 PM Robert Lente Add [R55] Syncope     2/28/2021  1:23 PM Robert Lente Add [U07 1] COVID-19     2/28/2021  1:23 PM Robert Lente Add [E87 6] Hypokalemia       ED Disposition     ED Disposition Condition Date/Time Comment    Discharge Stable Sun Feb 28, 2021  1:23 PM Garland Mace discharge to home/self care              Follow-up Information     Follow up With Specialties Details Why Claudette Corrente Nepomuceno, MD Family Medicine Call  For further evaluation [place] [id number]  [address] [facility] [address]  [phone]            Discharge Medication List as of 2/28/2021  1:24 PM      CONTINUE these medications which have NOT CHANGED    Details   azithromycin (ZITHROMAX) 250 mg tablet Take 2 tablets today then 1 tablet daily x 4 days, Normal      Cholecalciferol (Vitamin D3) 50 MCG (2000 UT) TABS Take 2,000 Units by mouth daily, Historical Med      clorazepate (TRANXENE) 3 75 mg tablet One tablet every 4-6 hours as needed for anxiety, Normal      levothyroxine 88 mcg tablet TAKE 1 TABLET BY MOUTH ONCE DAILY IN THE MORNING ON AN EMPTY STOMACH, Normal      loratadine (CLARITIN) 10 mg tablet Take 10 mg by mouth daily, Historical Med      olmesartan-hydrochlorothiazide (BENICAR HCT) 40-12 5 MG per tablet Take 1 tablet by mouth daily, Historical Med      oxymetazoline (AFRIN) 0 05 % nasal spray into each nostril daily , Historical Med           No discharge procedures on file      PDMP Review       Value Time User    PDMP Reviewed  Yes 11/10/2020  2:57 PM Tahira Waite PA-C          ED Provider  Electronically Signed by           Moni Chan DO  02/28/21 5301

## 2021-03-01 ENCOUNTER — TELEMEDICINE (OUTPATIENT)
Dept: FAMILY MEDICINE CLINIC | Facility: HOSPITAL | Age: 41
End: 2021-03-01
Payer: COMMERCIAL

## 2021-03-01 VITALS
TEMPERATURE: 98.4 F | HEART RATE: 106 BPM | WEIGHT: 158 LBS | HEIGHT: 62 IN | OXYGEN SATURATION: 97 % | BODY MASS INDEX: 29.08 KG/M2

## 2021-03-01 DIAGNOSIS — F41.9 ANXIETY: ICD-10-CM

## 2021-03-01 DIAGNOSIS — U07.1 PNEUMONIA DUE TO COVID-19 VIRUS: ICD-10-CM

## 2021-03-01 DIAGNOSIS — U07.1 COVID-19 VIRUS INFECTION: Primary | ICD-10-CM

## 2021-03-01 DIAGNOSIS — J12.82 PNEUMONIA DUE TO COVID-19 VIRUS: ICD-10-CM

## 2021-03-01 PROCEDURE — 99214 OFFICE O/P EST MOD 30 MIN: CPT | Performed by: FAMILY MEDICINE

## 2021-03-01 RX ORDER — LORAZEPAM 0.5 MG/1
0.5 TABLET ORAL EVERY 6 HOURS PRN
Qty: 30 TABLET | Refills: 0 | Status: SHIPPED | OUTPATIENT
Start: 2021-03-01 | End: 2021-06-30

## 2021-03-01 NOTE — PROGRESS NOTES
COVID-19 Virtual Visit     Assessment/Plan:    Problem List Items Addressed This Visit     None      Visit Diagnoses     COVID-19 virus infection    -  Primary    Pneumonia due to COVID-19 virus        Anxiety        Relevant Medications    LORazepam (ATIVAN) 0 5 mg tablet         Disposition:     I recommended continued isolation until at least 24 hours have passed since recovery defined as resolution of fever without the use of fever-reducing medications AND improvement in COVID symptoms AND 10 days have passed since onset of symptoms (or 10 days have passed since date of first positive viral diagnostic test for asymptomatic patients)  Continue with supportive treatment  On supplements  Discussed her anxiety  Currently uncontrolled  Will use low dose of ativan to use sparingly  Reviewed pdmp  Reviewed se/ar  Fu in 2 days  I have spent 10 minutes directly with the patient  Greater than 50% of this time was spent in counseling/coordination of care regarding: risks and benefits of treatment options, instructions for management and impressions  Encounter provider Monserrat Morris MD    Provider located at [address] [place] [address] MD  [address] [place] [address]    Recent Visits  Date Type Provider Dept   02/25/21 Telephone Melany Canchola Md   02/23/21 Telephone Laura Man Md   Showing recent visits within past 7 days and meeting all other requirements     Today's Visits  Date Type Provider Dept   03/01/21 Telemedicine MD Derick Parish Md   Showing today's visits and meeting all other requirements     Future Appointments  No visits were found meeting these conditions     Showing future appointments within next 150 days and meeting all other requirements      This virtual check-in was done via [employer] and patient was informed that this is not a secure, HIPAA-compliant platform  She agrees to proceed  Patient agrees to participate in a virtual check in via telephone or video visit instead of presenting to the office to address urgent/immediate medical needs  Patient is aware this is a billable service  After connecting through [facility], the patient was identified by name and date of birth  Marian Thao was informed that this was a telemedicine visit and that the exam was being conducted confidentially over secure lines  Marian Thao acknowledged consent and understanding of privacy and security of the telemedicine visit  I informed the patient that I have reviewed her record in Epic and presented the opportunity for her to ask any questions regarding the visit today  The patient agreed to participate  Subjective:   Marian Thao is a 42 y o  female who has been screened for COVID-19  Symptom change since last report: unchanged  Patient's symptoms include fatigue, malaise, nasal congestion, cough and shortness of breath  Patient denies fever, chills, sore throat, anosmia, loss of taste, abdominal pain, nausea, vomiting, diarrhea, myalgias and headaches  Starla Carroll has been staying home and has isolated themselves in her home  She is taking care to not share personal items and is cleaning all surfaces that are touched often, like counters, tabletops, and doorknobs using household cleaning sprays or wipes  She is wearing a mask when she leaves her room  Date of symptom onset: 2/25/2021  Date of exposure: 1/8/2021  Date of positive COVID-19 PCR: 2/25/2021    Patient with sinus pressure and mild pain  Mother currently admitted to hospital with covid-19  CXR was consistent with pneumonia  Reports doing ok but with coughing and mild sob  Pulse ox at 97% on RA  With increase anxiety due to family's current illness and situation  Increase nervousness about her mother , own disease, daughter, and        Lab Results   Component Value Date SARSCOV2 Positive (A) 2021    SARSCOV2 Not Detected 2020     Past Medical History:   Diagnosis Date    Allergic 2013    Disease of thyroid gland 2002    Hypertension 2000    PONV (postoperative nausea and vomiting)      Past Surgical History:   Procedure Laterality Date     SECTION  ,     CHOLECYSTECTOMY  2014    LIVER BIOPSY      OOPHORECTOMY Right 2019    WI LAMNOTMY INCL W/DCMPRSN NRV ROOT 1 INTRSPC LUMBR Right 11/10/2020    Procedure: LAMINECTOMY LUMBAR MINIMALLY INVASIVE, L5-S1, RIGHT;  Surgeon: Kate Duarte MD;  Location:  MAIN OR;  Service: Neurosurgery     Current Outpatient Medications   Medication Sig Dispense Refill    azithromycin (ZITHROMAX) 250 mg tablet Take 2 tablets today then 1 tablet daily x 4 days 6 tablet 0    Cholecalciferol (Vitamin D3) 50 MCG ( UT) TABS Take 2,000 Units by mouth daily      levothyroxine 88 mcg tablet TAKE 1 TABLET BY MOUTH ONCE DAILY IN THE MORNING ON AN EMPTY STOMACH 30 tablet 5    loratadine (CLARITIN) 10 mg tablet Take 10 mg by mouth daily      olmesartan-hydrochlorothiazide (BENICAR HCT) 40-12 5 MG per tablet Take 1 tablet by mouth daily      oxymetazoline (AFRIN) 0 05 % nasal spray into each nostril daily       LORazepam (ATIVAN) 0 5 mg tablet Take 1 tablet (0 5 mg total) by mouth every 6 (six) hours as needed for anxiety 30 tablet 0     Current Facility-Administered Medications   Medication Dose Route Frequency Provider Last Rate Last Admin    acetaminophen (TYLENOL) tablet 650 mg  650 mg Oral Once Doc Miranda MD         Allergies   Allergen Reactions    Other Vomiting     General Anesthesia ETT 2019, had post op N/V    Penicillins Rash       Review of Systems   Constitutional: Positive for fatigue  Negative for chills and fever  HENT: Positive for congestion  Negative for sore throat  Respiratory: Positive for cough and shortness of breath      Gastrointestinal: Negative for abdominal pain, diarrhea, nausea and vomiting  Musculoskeletal: Negative for myalgias  Neurological: Negative for headaches  Objective:    Vitals:    03/01/21 1120   Pulse: (!) 106   Temp: 98 4 °F (36 9 °C)   SpO2: 97%   Weight: 71 7 kg (158 lb)   Height: 5' 2" (1 575 m)       Physical Exam  Vitals signs reviewed  Constitutional:       Appearance: Normal appearance  She is not ill-appearing or diaphoretic  Pulmonary:      Effort: Pulmonary effort is normal    Neurological:      Mental Status: She is alert and oriented to person, place, and time  Psychiatric:         Mood and Affect: Mood normal          Behavior: Behavior normal        VIRTUAL VISIT DISCLAIMER    Kavya Bingham acknowledges that she has consented to an online visit or consultation  She understands that the online visit is based solely on information provided by her, and that, in the absence of a face-to-face physical evaluation by the physician, the diagnosis she receives is both limited and provisional in terms of accuracy and completeness  This is not intended to replace a full medical face-to-face evaluation by the physician  Kavya Bingham understands and accepts these terms

## 2021-03-03 ENCOUNTER — TELEMEDICINE (OUTPATIENT)
Dept: FAMILY MEDICINE CLINIC | Facility: HOSPITAL | Age: 41
End: 2021-03-03
Payer: COMMERCIAL

## 2021-03-03 ENCOUNTER — TELEMEDICINE (OUTPATIENT)
Dept: OBGYN CLINIC | Facility: CLINIC | Age: 41
End: 2021-03-03
Payer: COMMERCIAL

## 2021-03-03 VITALS — HEIGHT: 62 IN | WEIGHT: 158 LBS | TEMPERATURE: 98.8 F | BODY MASS INDEX: 29.08 KG/M2 | OXYGEN SATURATION: 97 %

## 2021-03-03 DIAGNOSIS — U07.1 PNEUMONIA DUE TO COVID-19 VIRUS: ICD-10-CM

## 2021-03-03 DIAGNOSIS — U07.1 COVID-19 VIRUS INFECTION: Primary | ICD-10-CM

## 2021-03-03 DIAGNOSIS — N83.202 OVARIAN CYST, LEFT: Primary | ICD-10-CM

## 2021-03-03 DIAGNOSIS — J12.82 PNEUMONIA DUE TO COVID-19 VIRUS: ICD-10-CM

## 2021-03-03 DIAGNOSIS — F41.9 ANXIETY: ICD-10-CM

## 2021-03-03 LAB
ATRIAL RATE: 108 BPM
P AXIS: 55 DEGREES
PR INTERVAL: 152 MS
QRS AXIS: 99 DEGREES
QRSD INTERVAL: 92 MS
QT INTERVAL: 358 MS
QTC INTERVAL: 479 MS
T WAVE AXIS: 40 DEGREES
VENTRICULAR RATE: 108 BPM

## 2021-03-03 PROCEDURE — 99213 OFFICE O/P EST LOW 20 MIN: CPT | Performed by: FAMILY MEDICINE

## 2021-03-03 PROCEDURE — G2012 BRIEF CHECK IN BY MD/QHP: HCPCS | Performed by: OBSTETRICS & GYNECOLOGY

## 2021-03-03 PROCEDURE — 93010 ELECTROCARDIOGRAM REPORT: CPT | Performed by: INTERNAL MEDICINE

## 2021-03-03 NOTE — PROGRESS NOTES
Virtual Brief Visit    Assessment/Plan:    Problem List Items Addressed This Visit     None      Visit Diagnoses     Ovarian cyst, left    -  Primary    Relevant Orders    US pelvis complete w transvaginal        Today we reviewed US from 8/2020 sent after incidental finding on MRI   Patient currently asymptomatic   She has hx of right oophorectomy for dermoid cyst in 2019  I have recommended follow up US   Differential diagnosis discussed / aware my suspicion for malignancy is extremely low  We discussed this could be a hemorrhagic cyst / endometrioma/ dermoid   Will follow up after repeat imaging  Aware at this time no intervention necessary as she is asymptomatic       US reviewed from 8/2020   UTERUS:  The uterus is anteverted in position, measuring 12 9 x 5 x 6 9 cm  Ventral lower uterine segment postresection scar  Otherwise, contour and echotexture appear normal   The cervix shows no suspicious abnormality      ENDOMETRIUM:    Normal caliber of 10 mm  Homogeneous and normal in appearance      OVARIES/ADNEXA:  Right ovary:  Post oophorectomy      Left ovary:  7 8 x 5 2 x 6 6 cm  No suspicious left ovarian abnormality  6 4 x 4 5 x 6 1 cm mildly heterogeneous nodule with low level echoes with areas of reticular pattern  Trace internal color-flow on a few images may be artifactual  Doppler flow within normal limits      Complex tubular structure in the left adnexa immediately superior to the left ovary overall measuring approximately 3 x 2 8 x 3 3 cm  Low level echoes with gradient level is present in the more superior component of this structure      Trace free fluid in the cul-de-sac      IMPRESSION:     6 4 cm left ovarian cystic nodule   Based on the ACR O-RADS system, this is O-RADS category 2 (almost certain benign with <7% risk of malignancy ) The management recommendation is followup pelvic ultrasound in 8 to 12 weeks      REFERENCE: Radiology 2020; [id number]     3 3 cm complex tubular cyst structure in the left adnexa  Based on the ACR O-RADS system, this is likely minimally complex hydrosalpinx  O-RADS category 2 (almost certain benign with <5% risk of malignancy ) The management recommendation is management by   gynecologist   Reason for visit is   Chief Complaint   Patient presents with    Virtual Regular Visit    Virtual Brief Visit        Encounter provider Donna Ibarra MD    Provider located at [address] [place] [address],[address]  [address]  [facility] [address] [phone]    Recent Visits  Date Type Provider Dept   03/01/21 Telemedicine Julianna West MD [facility] Doss [facility]   Showing recent visits within past 7 days and meeting all other requirements     Today's Visits  Date Type Provider Dept   03/03/21 Telemedicine Julianna West MD [facility]   03/03/21 Telemedicine Donna Ibarra MD [facility]   Showing today's visits and meeting all other requirements     Future Appointments  No visits were found meeting these conditions  Showing future appointments within next 150 days and meeting all other requirements        After connecting through telephone, the patient was identified by name and date of birth  Harshad Mckay was informed that this is a telemedicine visit and that the visit is being conducted through telephone  My office door was closed  No one else was in the room  She acknowledged consent and understanding of privacy and security of the platform  The patient has agreed to participate and understands she can discontinue the visit at any time  It was my intent to perform this visit via video technology but the patient was not able to do a video connection so the visit was completed via audio telephone only  Patient is aware this is a billable service       Haley Bergeron is a 42 y o  female for follow up form ovarian cyst diagnosed incidentally at time of MRI for back pain in 2020  State she then underwent US and had a repeat follow up [address] in 2020  Currently denies pain or irregular bleeding  Has hx of right oophorectomy at [facility] for dermoid cyst   Recently diagnosed with COVID   HPI     Past Medical History:   Diagnosis Date    Allergic 2013    Disease of thyroid gland 2002    Hypertension 2000    PONV (postoperative nausea and vomiting)        Past Surgical History:   Procedure Laterality Date     SECTION  ,     CHOLECYSTECTOMY  2014    LIVER BIOPSY      OOPHORECTOMY Right 2019    NC LAMNOTMY INCL W/DCMPRSN NRV ROOT 1 INTRSPC LUMBR Right 11/10/2020    Procedure: LAMINECTOMY LUMBAR MINIMALLY INVASIVE, L5-S1, RIGHT;  Surgeon: Sha [facility] MD;  Location:  MAIN OR;  Service: Neurosurgery       Current Outpatient Medications   Medication Sig Dispense Refill    azithromycin (ZITHROMAX) 250 mg tablet Take 2 tablets today then 1 tablet daily x 4 days 6 tablet 0    Cholecalciferol (Vitamin D3) 50 MCG ( UT) TABS Take 2,000 Units by mouth daily      levothyroxine 88 mcg tablet TAKE 1 TABLET BY MOUTH ONCE DAILY IN THE MORNING ON AN EMPTY STOMACH 30 tablet 5    loratadine (CLARITIN) 10 mg tablet Take 10 mg by mouth daily      LORazepam (ATIVAN) 0 5 mg tablet Take 1 tablet (0 5 mg total) by mouth every 6 (six) hours as needed for anxiety 30 tablet 0    olmesartan-hydrochlorothiazide (BENICAR HCT) 40-12 5 MG per tablet Take 1 tablet by mouth daily      oxymetazoline (AFRIN) 0 05 % nasal spray into each nostril daily        Current Facility-Administered Medications   Medication Dose Route Frequency Provider Last Rate Last Admin    acetaminophen (TYLENOL) tablet 650 mg  650 mg Oral Once Valeriano Ashby MD            Allergies   Allergen Reactions    Other Vomiting     General Anesthesia ETT 2019, had post op N/V    Penicillins Rash       Review of Systems   Constitutional: Positive for chills and fatigue  HENT: Positive for congestion  Respiratory: Positive for shortness of breath  Negative for apnea, cough, choking, chest tightness, wheezing and stridor  Cardiovascular: Negative for chest pain, palpitations and leg swelling  Gastrointestinal: Negative  Genitourinary: Negative  Psychiatric/Behavioral: Negative  There were no vitals filed for this visit  I spent 10 minutes directly with the patient during this visit    VIRTUAL VISIT DISCLAIMER    Ilan Blake acknowledges that she has consented to an online visit or consultation  She understands that the online visit is based solely on information provided by her, and that, in the absence of a face-to-face physical evaluation by the physician, the diagnosis she receives is both limited and provisional in terms of accuracy and completeness  This is not intended to replace a full medical face-to-face evaluation by the physician  Ilan Blake understands and accepts these terms

## 2021-03-03 NOTE — PROGRESS NOTES
COVID-19 Virtual Visit     Assessment/Plan:    Problem List Items Addressed This Visit     None         Disposition:     I recommended continued isolation until at least 24 hours have passed since recovery defined as resolution of fever without the use of fever-reducing medications AND improvement in COVID symptoms AND 10 days have passed since onset of symptoms (or 10 days have passed since date of first positive viral diagnostic test for asymptomatic patients)  Fu in 2 days virtual visit  I have spent 8 minutes directly with the patient  Greater than 50% of this time was spent in counseling/coordination of care regarding: instructions for management and impressions  Encounter provider Leobardo Coronel MD    Provider located at [address] [place] [address] MD  [address] [place] [address]    Recent Visits  Date Type Provider Dept   03/01/21 Telemedicine MD Derick Shields Md   02/25/21 Telephone [facility] Jessica Gaspar Md   Showing recent visits within past 7 days and meeting all other requirements     Today's Visits  Date Type Provider Dept   03/03/21 Telemedicine MD Derick Shields Md   Showing today's visits and meeting all other requirements     Future Appointments  No visits were found meeting these conditions  Showing future appointments within next 150 days and meeting all other requirements      This virtual check-in was done via [employer] and patient was informed that this is not a secure, HIPAA-compliant platform  She agrees to proceed  Patient agrees to participate in a virtual check in via telephone or video visit instead of presenting to the office to address urgent/immediate medical needs  Patient is aware this is a billable service  After connecting through [facility], the patient was identified by name and date of birth   Suzette Abreu was informed that this was a telemedicine visit and that the exam was being conducted confidentially over secure lines  My office door was closed  No one else was in the room  Yung Romero acknowledged consent and understanding of privacy and security of the telemedicine visit  I informed the patient that I have reviewed her record in Epic and presented the opportunity for her to ask any questions regarding the visit today  The patient agreed to participate  Subjective:   Ynug Romreo is a 42 y o  female who has been screened for COVID-19  Symptom change since last report: unchanged  Patient's symptoms include fever, chills, fatigue, malaise, nasal congestion, anosmia, loss of taste, cough, shortness of breath and diarrhea  Patient denies rhinorrhea, chest tightness, abdominal pain, nausea, vomiting, myalgias and headaches  Date of symptom onset: 2021  Date of exposure: 2021  Date of positive COVID-19 PCR: 2021    No significant improvement but also no worsening  Pulse oximetry is stable and mostly in the [id number]  No o2 sat < 90         Lab Results   Component Value Date    SARSCOV2 Positive (A) 2021    SARSCOV2 Not Detected 2020     Past Medical History:   Diagnosis Date    Allergic 2013    Disease of thyroid gland 2002    Hypertension 2000    PONV (postoperative nausea and vomiting)      Past Surgical History:   Procedure Laterality Date     SECTION  ,     CHOLECYSTECTOMY  2014    LIVER BIOPSY      OOPHORECTOMY Right 2019    SD LAMNOTMY INCL W/DCMPRSN NRV ROOT 1 INTRSPC LUMBR Right 11/10/2020    Procedure: LAMINECTOMY LUMBAR MINIMALLY INVASIVE, L5-S1, RIGHT;  Surgeon: Princess Sanchez MD;  Location:  MAIN OR;  Service: Neurosurgery     Current Outpatient Medications   Medication Sig Dispense Refill    azithromycin (ZITHROMAX) 250 mg tablet Take 2 tablets today then 1 tablet daily x 4 days 6 tablet 0    Cholecalciferol (Vitamin D3) 50 MCG ( UT) TABS Take 2,000 Units by mouth daily      levothyroxine 88 mcg tablet TAKE 1 TABLET BY MOUTH ONCE DAILY IN THE MORNING ON AN EMPTY STOMACH 30 tablet 5    loratadine (CLARITIN) 10 mg tablet Take 10 mg by mouth daily      LORazepam (ATIVAN) 0 5 mg tablet Take 1 tablet (0 5 mg total) by mouth every 6 (six) hours as needed for anxiety 30 tablet 0    olmesartan-hydrochlorothiazide (BENICAR HCT) 40-12 5 MG per tablet Take 1 tablet by mouth daily      oxymetazoline (AFRIN) 0 05 % nasal spray into each nostril daily        Current Facility-Administered Medications   Medication Dose Route Frequency Provider Last Rate Last Admin    acetaminophen (TYLENOL) tablet 650 mg  650 mg Oral Once Chele Caceres MD         Allergies   Allergen Reactions    Other Vomiting     General Anesthesia ETT 8/30/2019, had post op N/V    Penicillins Rash       Review of Systems   Constitutional: Positive for chills, fatigue and fever  HENT: Positive for congestion  Negative for rhinorrhea  Respiratory: Positive for cough and shortness of breath  Negative for chest tightness  Gastrointestinal: Positive for diarrhea  Negative for abdominal pain, nausea and vomiting  Musculoskeletal: Negative for myalgias  Neurological: Negative for headaches  Objective:    Vitals:    03/03/21 1013   Temp: 98 8 °F (37 1 °C)   SpO2: 97%   Weight: 71 7 kg (158 lb)   Height: 5' 2" (1 575 m)       Physical Exam  Nursing note reviewed  Constitutional:       Appearance: She is well-developed  She is not ill-appearing or diaphoretic  Pulmonary:      Effort: Pulmonary effort is normal    Neurological:      Mental Status: She is alert  Psychiatric:         Mood and Affect: Mood normal          Behavior: Behavior normal        VIRTUAL VISIT DISCLAIMER    Princess Puckett acknowledges that she has consented to an online visit or consultation   She understands that the online visit is based solely on information provided by her, and that, in the absence of a face-to-face physical evaluation by the physician, the diagnosis she receives is both limited and provisional in terms of accuracy and completeness  This is not intended to replace a full medical face-to-face evaluation by the physician  Olvin Yadav understands and accepts these terms

## 2021-03-04 ENCOUNTER — TELEPHONE (OUTPATIENT)
Dept: FAMILY MEDICINE CLINIC | Facility: HOSPITAL | Age: 41
End: 2021-03-04

## 2021-03-04 NOTE — TELEPHONE ENCOUNTER
Called patient- [facility] states that she went to an Urgent care when she was first sick and they prescribed antibiotic Zithromax 250 mg  Per patient she has been following up with Dr Sudhir Kohler and was advised that the medication that the urgent care rx would not do much being that she is Covid pos  Patient is very upset and crying  Per patient  her mom is in the hospital on Oxygen  Per [facility] she is concerned that is she will not get better because she is having a lot of anxiety and stress and is really worried  Patient would like to know if there is anything at all she can take or if she Qualifys for  any medication that will  help her get better  [facility] would like to know if would it hurt to take the Zithromax  or if that Dr Sudhir Kohler could Rx something?

## 2021-03-04 NOTE — TELEPHONE ENCOUNTER
For covid-19 infection, unfortuantely antibiotics will not do much for this  If she would like to follow the advice from Urgent care she can but I am not prescribing that  Unfortunately her mother is very sick in the hospital with covid  The ativan may help relieve some of her anxiety       Will fu as scheduled tomorrow    (please double check if scheduled)

## 2021-03-05 ENCOUNTER — TELEMEDICINE (OUTPATIENT)
Dept: FAMILY MEDICINE CLINIC | Facility: HOSPITAL | Age: 41
End: 2021-03-05
Payer: COMMERCIAL

## 2021-03-05 VITALS
TEMPERATURE: 98.4 F | BODY MASS INDEX: 29.08 KG/M2 | DIASTOLIC BLOOD PRESSURE: 84 MMHG | HEIGHT: 62 IN | WEIGHT: 158 LBS | OXYGEN SATURATION: 96 % | SYSTOLIC BLOOD PRESSURE: 106 MMHG

## 2021-03-05 DIAGNOSIS — U07.1 PNEUMONIA DUE TO COVID-19 VIRUS: Primary | ICD-10-CM

## 2021-03-05 DIAGNOSIS — J12.82 PNEUMONIA DUE TO COVID-19 VIRUS: Primary | ICD-10-CM

## 2021-03-05 DIAGNOSIS — U07.1 COVID-19 VIRUS INFECTION: ICD-10-CM

## 2021-03-05 PROCEDURE — 3079F DIAST BP 80-89 MM HG: CPT | Performed by: FAMILY MEDICINE

## 2021-03-05 PROCEDURE — 3074F SYST BP LT 130 MM HG: CPT | Performed by: FAMILY MEDICINE

## 2021-03-05 PROCEDURE — 99213 OFFICE O/P EST LOW 20 MIN: CPT | Performed by: FAMILY MEDICINE

## 2021-03-05 RX ORDER — ALBUTEROL SULFATE 90 UG/1
2 AEROSOL, METERED RESPIRATORY (INHALATION) EVERY 6 HOURS PRN
Qty: 1 INHALER | Refills: 1 | Status: SHIPPED | OUTPATIENT
Start: 2021-03-05 | End: 2021-09-14

## 2021-03-05 NOTE — PROGRESS NOTES
COVID-19 Virtual Visit     Assessment/Plan:    Problem List Items Addressed This Visit     None      Visit Diagnoses     Pneumonia due to COVID-19 virus    -  Primary    Relevant Medications    albuterol (ProAir HFA) 90 mcg/act inhaler    COVID-19 virus infection        Relevant Medications    albuterol (ProAir HFA) 90 mcg/act inhaler         Disposition:     I have spent 12 minutes directly with the patient  Greater than 50% of this time was spent in counseling/coordination of care regarding: risks and benefits of treatment options, instructions for management and impressions  Follow-up in 3 days  Continue the ER precautions  At this point I do not think antibiotics or steroids are indicated  She does not meet criteria for monoclonal antibody infusion  Encounter provider Maria Antonia Coleman MD    Provider located at [address] [place] [address]  [address] [place] [address]    Recent Visits  Date Type Provider Dept   03/04/21 Telephone Modesto Louie Md   03/03/21 Telemedicine MD Derick Mercado Md   03/01/21 Telemedicine MD Derick Mercado Md   Showing recent visits within past 7 days and meeting all other requirements     Today's Visits  Date Type Provider Dept   03/05/21 Telemedicine MD Derick Mercado Md   Showing today's visits and meeting all other requirements     Future Appointments  No visits were found meeting these conditions  Showing future appointments within next 150 days and meeting all other requirements      This virtual check-in was done via [employer] and patient was informed that this is not a secure, HIPAA-compliant platform  She agrees to proceed  Patient agrees to participate in a virtual check in via telephone or video visit instead of presenting to the office to address urgent/immediate medical needs   Patient is aware this is a billable service  After connecting through [facility], the patient was identified by name and date of birth  Nicole Mendiola was informed that this was a telemedicine visit and that the exam was being conducted confidentially over secure lines  My office door was closed  No one else was in the room  Nicole Mendiola acknowledged consent and understanding of privacy and security of the telemedicine visit  I informed the patient that I have reviewed her record in Epic and presented the opportunity for her to ask any questions regarding the visit today  The patient agreed to participate  Subjective:   Nicole Mendiola is a 42 y o  female who has been screened for COVID-19  Symptom change since last report: unchanged  Patient's symptoms include fever, fatigue, malaise, cough, shortness of breath, diarrhea and headache  Patient denies chills, congestion, rhinorrhea, sore throat, anosmia, loss of taste, chest tightness, abdominal pain, nausea, vomiting and myalgias  Christa Sahu has been staying home and has isolated themselves in her home  She is taking care to not share personal items and is cleaning all surfaces that are touched often, like counters, tabletops, and doorknobs using household cleaning sprays or wipes  She is wearing a mask when she leaves her room  Date of symptom onset: 2/25/2021  Date of exposure: 1/8/2021  Date of positive COVID-19 PCR: 2/25/2021      Pulse ox continues to be normal   Shortness of breath on exertion but improved at rest   Has had intermittent fever T-max of a 101° F  No worsening of the coughing no worsening of the shortness of breath  Unfortunately her mother is currently in the hospital and currently on BiPAP due to COVID infection/pneumonia      Lab Results   Component Value Date    SARSCOV2 Positive (A) 02/25/2021    SARSCOV2 Not Detected 11/05/2020     Past Medical History:   Diagnosis Date    Allergic 2013    Disease of thyroid gland 2002    Hypertension 2000    PONV (postoperative nausea and vomiting)      Past Surgical History:   Procedure Laterality Date     SECTION  ,     CHOLECYSTECTOMY  2014    LIVER BIOPSY      OOPHORECTOMY Right 2019    NM LAMNOTMY INCL W/DCMPRSN NRV ROOT 1 INTRSPC LUMBR Right 11/10/2020    Procedure: LAMINECTOMY LUMBAR MINIMALLY INVASIVE, L5-S1, RIGHT;  Surgeon: Lizzette Thurman MD;  Location:  MAIN OR;  Service: Neurosurgery     Current Outpatient Medications   Medication Sig Dispense Refill    Cholecalciferol (Vitamin D3) 50 MCG (2000 UT) TABS Take 2,000 Units by mouth daily      levothyroxine 88 mcg tablet TAKE 1 TABLET BY MOUTH ONCE DAILY IN THE MORNING ON AN EMPTY STOMACH 30 tablet 5    loratadine (CLARITIN) 10 mg tablet Take 10 mg by mouth daily      LORazepam (ATIVAN) 0 5 mg tablet Take 1 tablet (0 5 mg total) by mouth every 6 (six) hours as needed for anxiety 30 tablet 0    olmesartan-hydrochlorothiazide (BENICAR HCT) 40-12 5 MG per tablet Take 1 tablet by mouth daily      oxymetazoline (AFRIN) 0 05 % nasal spray into each nostril daily       albuterol (ProAir HFA) 90 mcg/act inhaler Inhale 2 puffs every 6 (six) hours as needed for wheezing or shortness of breath 1 Inhaler 1     Current Facility-Administered Medications   Medication Dose Route Frequency Provider Last Rate Last Admin    acetaminophen (TYLENOL) tablet 650 mg  650 mg Oral Once Rosario Arzate MD         Allergies   Allergen Reactions    Other Vomiting     General Anesthesia ETT 2019, had post op N/V    Penicillins Rash       Review of Systems   Constitutional: Positive for fatigue and fever  Negative for chills  HENT: Negative for congestion, rhinorrhea and sore throat  Respiratory: Positive for cough and shortness of breath  Negative for chest tightness  Gastrointestinal: Positive for diarrhea  Negative for abdominal pain, nausea and vomiting  Musculoskeletal: Negative for myalgias  Neurological: Positive for headaches  Objective:    Vitals:    03/05/21 0945   BP: 106/84   Temp: 98 4 °F (36 9 °C)   SpO2: 96%   Weight: 71 7 kg (158 lb)   Height: 5' 2" (1 575 m)       Physical Exam  Vitals signs reviewed  Constitutional:       General: She is not in acute distress  Appearance: She is not ill-appearing, toxic-appearing or diaphoretic  Pulmonary:      Effort: Pulmonary effort is normal    Neurological:      Mental Status: She is alert  VIRTUAL VISIT DISCLAIMER    Kavya Bingham acknowledges that she has consented to an online visit or consultation  She understands that the online visit is based solely on information provided by her, and that, in the absence of a face-to-face physical evaluation by the physician, the diagnosis she receives is both limited and provisional in terms of accuracy and completeness  This is not intended to replace a full medical face-to-face evaluation by the physician  Kavya Bingham understands and accepts these terms

## 2021-03-10 DIAGNOSIS — Z23 ENCOUNTER FOR IMMUNIZATION: ICD-10-CM

## 2021-03-11 ENCOUNTER — TELEMEDICINE (OUTPATIENT)
Dept: FAMILY MEDICINE CLINIC | Facility: HOSPITAL | Age: 41
End: 2021-03-11
Payer: COMMERCIAL

## 2021-03-11 VITALS — WEIGHT: 158 LBS | TEMPERATURE: 97.2 F | HEIGHT: 62 IN | BODY MASS INDEX: 29.08 KG/M2

## 2021-03-11 DIAGNOSIS — J12.82 PNEUMONIA DUE TO COVID-19 VIRUS: Primary | ICD-10-CM

## 2021-03-11 DIAGNOSIS — U07.1 PNEUMONIA DUE TO COVID-19 VIRUS: Primary | ICD-10-CM

## 2021-03-11 DIAGNOSIS — I10 BENIGN ESSENTIAL HTN: ICD-10-CM

## 2021-03-11 DIAGNOSIS — E66.3 OVERWEIGHT: ICD-10-CM

## 2021-03-11 DIAGNOSIS — U07.1 COVID-19 VIRUS INFECTION: ICD-10-CM

## 2021-03-11 PROCEDURE — 99214 OFFICE O/P EST MOD 30 MIN: CPT | Performed by: FAMILY MEDICINE

## 2021-03-11 PROCEDURE — 3008F BODY MASS INDEX DOCD: CPT | Performed by: FAMILY MEDICINE

## 2021-03-11 PROCEDURE — 1036F TOBACCO NON-USER: CPT | Performed by: FAMILY MEDICINE

## 2021-03-11 RX ORDER — OLMESARTAN MEDOXOMIL AND HYDROCHLOROTHIAZIDE 20/12.5 20; 12.5 MG/1; MG/1
1 TABLET ORAL DAILY
Qty: 90 TABLET | Refills: 0 | Status: SHIPPED | OUTPATIENT
Start: 2021-03-11 | End: 2021-06-13

## 2021-03-11 NOTE — PROGRESS NOTES
COVID-19 Virtual Visit     Assessment/Plan:    Problem List Items Addressed This Visit        Cardiovascular and Mediastinum    Benign essential HTN    Relevant Medications    olmesartan-hydrochlorothiazide (BENICAR HCT) 20-12 5 MG per tablet      Other Visit Diagnoses     Pneumonia due to COVID-19 virus    -  Primary    COVID-19 virus infection             Disposition:     I recommended continued isolation until at least 24 hours have passed since recovery defined as resolution of fever without the use of fever-reducing medications AND improvement in COVID symptoms AND 10 days have passed since onset of symptoms (or 10 days have passed since date of first positive viral diagnostic test for asymptomatic patients)  Patient may end quarantine  Overall doing well  Mild intermittent sob which is not unexpected at this point  Continue to monitor  She will call if any worsening or persistence of sytmpoms  Htn  Doing well with lower dose of HTN medication  New rx for lower dose given  Continue to monitor Bp at home  I have spent 10 minutes directly with the patient  Greater than 50% of this time was spent in counseling/coordination of care regarding: risks and benefits of treatment options and impressions  Encounter provider Mary Rodriguez MD    Provider located at [address] [place] [address] [address] [place] [id number]    Recent Visits  Date Type Provider Dept   03/05/21 Telemedicine MD Derick Velasquez Md   03/04/21 Telephone [id number] recent visits within past 7 days and meeting all other requirements     Today's Visits  Date Type Provider Dept   03/11/21 Telemedicine MD Derick Velasquez Md   Showing today's visits and meeting all other requirements     Future Appointments  No visits were found meeting these conditions     Showing future appointments within next 150 days and meeting all other requirements      This virtual check-in was done via [employer] and patient was informed that this is not a secure, HIPAA-compliant platform  She agrees to proceed  Patient agrees to participate in a virtual check in via telephone or video visit instead of presenting to the office to address urgent/immediate medical needs  Patient is aware this is a billable service  After connecting through [facility], the patient was identified by name and date of birth  Elías Vu was informed that this was a telemedicine visit and that the exam was being conducted confidentially over secure lines  My office door was closed  Elías Vu acknowledged consent and understanding of privacy and security of the telemedicine visit  I informed the patient that I have reviewed her record in Epic and presented the opportunity for her to ask any questions regarding the visit today  The patient agreed to participate  Subjective:   Elías Vu is a 42 y o  female who has been screened for COVID-19  Patient's symptoms include shortness of breath  Patient denies fever, chills, fatigue, congestion, sore throat, cough, chest tightness, abdominal pain, nausea and diarrhea       Date of symptom onset: 2021  Date of exposure: 2021  Date of positive COVID-19 PCR: 2021    Lab Results   Component Value Date    SARSCOV2 Positive (A) 2021    SARSCOV2 Not Detected 2020     Past Medical History:   Diagnosis Date    Allergic 2013    Disease of thyroid gland 2002    Hypertension     PONV (postoperative nausea and vomiting)      Past Surgical History:   Procedure Laterality Date     SECTION  ,     CHOLECYSTECTOMY  2014    LIVER BIOPSY      OOPHORECTOMY Right 2019    GA LAMNOTMY INCL W/DCMPRSN NRV ROOT 1 INTRSPC LUMBR Right 11/10/2020    Procedure: LAMINECTOMY LUMBAR MINIMALLY INVASIVE, L5-S1, RIGHT;  Surgeon: Meaghan Mo MD;  Location: [facility] OR;  Service: Neurosurgery     Current Outpatient Medications   Medication Sig Dispense Refill    albuterol (ProAir HFA) 90 mcg/act inhaler Inhale 2 puffs every 6 (six) hours as needed for wheezing or shortness of breath 1 Inhaler 1    Cholecalciferol (Vitamin D3) 50 MCG (2000 UT) TABS Take 2,000 Units by mouth daily      levothyroxine 88 mcg tablet TAKE 1 TABLET BY MOUTH ONCE DAILY IN THE MORNING ON AN EMPTY STOMACH 30 tablet 5    loratadine (CLARITIN) 10 mg tablet Take 10 mg by mouth daily      LORazepam (ATIVAN) 0 5 mg tablet Take 1 tablet (0 5 mg total) by mouth every 6 (six) hours as needed for anxiety 30 tablet 0    oxymetazoline (AFRIN) 0 05 % nasal spray into each nostril daily       olmesartan-hydrochlorothiazide (BENICAR HCT) 20-12 5 MG per tablet Take 1 tablet by mouth daily 90 tablet 0     Current Facility-Administered Medications   Medication Dose Route Frequency Provider Last Rate Last Admin    acetaminophen (TYLENOL) tablet 650 mg  650 mg Oral Once Sal Francois MD         Allergies   Allergen Reactions    Other Vomiting     General Anesthesia ETT 8/30/2019, had post op N/V    Penicillins Rash       Review of Systems   Constitutional: Negative  Negative for activity change, appetite change, chills, diaphoresis, fatigue and fever  HENT: Negative for congestion, facial swelling and sore throat  Respiratory: Positive for shortness of breath  Negative for apnea, cough and chest tightness  Cardiovascular: Negative  Negative for chest pain and palpitations  Gastrointestinal: Negative  Negative for abdominal distention, abdominal pain, blood in stool, constipation, diarrhea and nausea  Genitourinary: Negative  Negative for difficulty urinating, dysuria, flank pain and frequency  Objective:    Vitals:    03/11/21 0932   Temp: (!) 97 2 °F (36 2 °C)   Weight: 71 7 kg (158 lb)   Height: 5' 2" (1 575 m)       Physical Exam  Vitals signs and nursing note reviewed  Constitutional:       Appearance: Normal appearance  She is well-developed  She is not ill-appearing or diaphoretic  HENT:      Head: Normocephalic and atraumatic  Eyes:      Conjunctiva/sclera: Conjunctivae normal       Pupils: Pupils are equal, round, and reactive to light  Neck:      Musculoskeletal: Normal range of motion and neck supple  Cardiovascular:      Rate and Rhythm: Normal rate and regular rhythm  Heart sounds: Normal heart sounds  Pulmonary:      Effort: Pulmonary effort is normal       Breath sounds: Normal breath sounds  Abdominal:      General: Bowel sounds are normal       Palpations: Abdomen is soft  Skin:     General: Skin is warm and dry  Neurological:      Mental Status: She is alert  Psychiatric:         Mood and Affect: Mood normal          Behavior: Behavior normal      BMI Counseling: Body mass index is 28 9 kg/m²  The BMI is above normal  Nutrition recommendations include reducing portion sizes, decreasing overall calorie intake and 3-5 servings of fruits/vegetables daily  Exercise recommendations include moderate aerobic physical activity for 150 minutes/week  VIRTUAL VISIT DISCLAIMER    Kurt Romero acknowledges that she has consented to an online visit or consultation  She understands that the online visit is based solely on information provided by her, and that, in the absence of a face-to-face physical evaluation by the physician, the diagnosis she receives is both limited and provisional in terms of accuracy and completeness  This is not intended to replace a full medical face-to-face evaluation by the physician  Kurt Romero understands and accepts these terms

## 2021-05-06 ENCOUNTER — HOSPITAL ENCOUNTER (OUTPATIENT)
Dept: ULTRASOUND IMAGING | Facility: HOSPITAL | Age: 41
Discharge: HOME | End: 2021-05-06
Payer: COMMERCIAL

## 2021-05-06 DIAGNOSIS — N83.202 OVARIAN CYST, LEFT: ICD-10-CM

## 2021-05-06 PROCEDURE — 76856 US EXAM PELVIC COMPLETE: CPT

## 2021-05-06 PROCEDURE — 76830 TRANSVAGINAL US NON-OB: CPT

## 2021-05-10 DIAGNOSIS — E03.9 HYPOTHYROIDISM, UNSPECIFIED TYPE: ICD-10-CM

## 2021-05-10 RX ORDER — LEVOTHYROXINE SODIUM 88 UG/1
TABLET ORAL
Qty: 30 TABLET | Refills: 0 | Status: SHIPPED | OUTPATIENT
Start: 2021-05-10 | End: 2021-06-06

## 2021-05-18 ENCOUNTER — TELEPHONE (OUTPATIENT)
Dept: OBGYN CLINIC | Facility: MEDICAL CENTER | Age: 41
End: 2021-05-18

## 2021-05-24 ENCOUNTER — OFFICE VISIT (OUTPATIENT)
Dept: OBGYN CLINIC | Facility: CLINIC | Age: 41
End: 2021-05-24
Payer: COMMERCIAL

## 2021-05-24 VITALS — SYSTOLIC BLOOD PRESSURE: 130 MMHG | DIASTOLIC BLOOD PRESSURE: 80 MMHG | BODY MASS INDEX: 31.09 KG/M2 | WEIGHT: 170 LBS

## 2021-05-24 DIAGNOSIS — R10.2 PELVIC PAIN: ICD-10-CM

## 2021-05-24 DIAGNOSIS — N80.1 ENDOMETRIOMA OF OVARY: Primary | ICD-10-CM

## 2021-05-24 PROCEDURE — 99213 OFFICE O/P EST LOW 20 MIN: CPT | Performed by: OBSTETRICS & GYNECOLOGY

## 2021-05-24 PROCEDURE — 1036F TOBACCO NON-USER: CPT | Performed by: OBSTETRICS & GYNECOLOGY

## 2021-05-28 NOTE — PROGRESS NOTES
Assessment Diagnoses and all orders for this visit:    Endometrioma of ovary    Pelvic pain         Plan: Today we reviewed US showing persistent  Large left ovarian cystic structure that seems most consistent with endometrioma   We discussed expectant management vs surgical management   Patient states mostly interested in definitive management and would prefer left sided oophorectomy rather than cystectomy as she does not want chance of recurrence  States she has already had a right oophorectomy for similar reason and would feel most comfortable with this  We discussed risks of surgical menopause as it relates to increased cardiovascular disease and bone disease as well a menopausal symptoms   Aware that she would need to maintain active lifestyle and balanced diet as well as supplement with calcium and vitamin D  We discussed the use of HRT if symptomatic  Patient had questions about wether or not hysterectomy would be permed at time of surgery   Aware recovery time would be more form la a TLH, LSO than from an LSO   Given that patient really interested in surgery I have referred her to one of my partners as she does not desire to wait for me to return from maternity leave and would prefer this be performed during the summer    Will have office coordinate consultation      hRonda   Ziggyadore Fulton is a 42 y o  female here for a problem visit and US follow up  She has hx of prior oophorectomy of the right ovary for dermoid cyst and pelvic pain in 2019  Karrie Nissen She had an incidental finding on MRI with large ovarian cyst   Today presents for follow up from repeat US sent as well as to discuss management options       Patient Active Problem List   Diagnosis    Abnormal uterine bleeding    Ovarian cyst, right    Chronic pain syndrome    Chronic pain of right knee    Primary osteoarthritis of right knee    Chronic right-sided low back pain without sciatica    Radiculopathy, lumbar region    Benign essential HTN  Hypothyroidism    Lumbar disc herniation    PONV (postoperative nausea and vomiting)       Gynecologic History  Patient's last menstrual period was 2021        Past Medical History:   Diagnosis Date    Allergic 2013    Disease of thyroid gland 2002    Hypertension 2000    PONV (postoperative nausea and vomiting)      Past Surgical History:   Procedure Laterality Date     SECTION  ,     CHOLECYSTECTOMY  2014    LIVER BIOPSY      OOPHORECTOMY Right 2019    MO LAMNOTMY INCL W/DCMPRSN NRV ROOT 1 INTRSPC LUMBR Right 11/10/2020    Procedure: LAMINECTOMY LUMBAR MINIMALLY INVASIVE, L5-S1, RIGHT;  Surgeon: Luther Mojica MD;  Location:  MAIN OR;  Service: Neurosurgery     Family History   Problem Relation Age of Onset    Arthritis Mother     Arthritis Maternal Grandmother     Parkinsonism Maternal Grandmother     Psoriasis Daughter     No Known Problems Father     No Known Problems Maternal Grandfather     No Known Problems Paternal Grandmother     No Known Problems Paternal Grandfather     No Known Problems Maternal Aunt      Social History     Socioeconomic History    Marital status: /Civil Union     Spouse name: Not on file    Number of children: Not on file    Years of education: Not on file    Highest education level: Not on file   Occupational History    Not on file   Social Needs    Financial resource strain: Not on file    Food insecurity     Worry: Not on file     Inability: Not on file   [employer] needs     Medical: Not on file     Non-medical: Not on file   Tobacco Use    Smoking status: Never Smoker    Smokeless tobacco: Never Used   Substance and Sexual Activity    Alcohol use: Not Currently     Alcohol/week: 1 0 standard drinks     Types: 1 Standard drinks or equivalent per week    Drug use: Never    Sexual activity: Yes     Partners: Male     Birth control/protection: Male Sterilization   Lifestyle    Physical activity     Days per week: Not on file     Minutes per session: Not on file    Stress: Not on file   Relationships    Social connections     Talks on phone: Not on file     Gets together: Not on file     Attends Mandaeism service: Not on file     Active member of club or organization: Not on file     Attends meetings of clubs or organizations: Not on file     Relationship status: Not on file    Intimate partner violence     Fear of current or ex partner: Not on file     Emotionally abused: Not on file     Physically abused: Not on file     Forced sexual activity: Not on file   Other Topics Concern    Not on file   Social History Narrative    Not on file     Allergies   Allergen Reactions    Other Vomiting     General Anesthesia ETT 8/30/2019, had post op N/V    Penicillins Rash       Current Outpatient Medications:     albuterol (ProAir HFA) 90 mcg/act inhaler, Inhale 2 puffs every 6 (six) hours as needed for wheezing or shortness of breath, Disp: 1 Inhaler, Rfl: 1    Cholecalciferol (Vitamin D3) 50 MCG (2000 UT) TABS, Take 2,000 Units by mouth daily, Disp: , Rfl:     levothyroxine 88 mcg tablet, TAKE 1 TABLET BY MOUTH ONCE DAILY IN THE MORNING ON AN EMPTY STOMACH, Disp: 30 tablet, Rfl: 0    loratadine (CLARITIN) 10 mg tablet, Take 10 mg by mouth daily, Disp: , Rfl:     LORazepam (ATIVAN) 0 5 mg tablet, Take 1 tablet (0 5 mg total) by mouth every 6 (six) hours as needed for anxiety, Disp: 30 tablet, Rfl: 0    olmesartan-hydrochlorothiazide (BENICAR HCT) 20-12 5 MG per tablet, Take 1 tablet by mouth daily, Disp: 90 tablet, Rfl: 0    oxymetazoline (AFRIN) 0 05 % nasal spray, into each nostril daily , Disp: , Rfl:     Current Facility-Administered Medications:     acetaminophen (TYLENOL) tablet 650 mg, 650 mg, Oral, Once, Merline Boon, MD    Review of Systems  Constitutional :no fever, feels well, no tiredness, no recent weight gain or loss  ENT: no ear ache, no loss of hearing, no nosebleeds or nasal discharge, no sore throat or hoarseness  Cardiovascular: no complaints of slow or fast heart beat, no chest pain, no palpitations, no leg claudication or lower extremity edema  Respiratory: no complaints of shortness of shortness of breath, no LOREDO  Breasts:no complaints of breast pain, breast lump, or nipple discharge  Gastrointestinal: no complaints of abdominal pain, constipation, nausea, vomiting, or diarrhea or bloody stools  Genitourinary :  as noted in HPI  Musculoskeletal: no complaints of arthralgia, no myalgia, no joint swelling or stiffness, no limb pain or swelling  Integumentary: no complaints of skin rash or lesion, itching or dry skin  Neurological: no complaints of headache, no confusion, no numbness or tingling, no dizziness or fainting     Objective     /80   Wt 77 1 kg (170 lb)   LMP 05/20/2021   BMI 31 09 kg/m²     General:   appears stated age, cooperative, alert normal mood and affect   Lungs: Unlabored breathing    Skin normal skin turgor and no rashes  Psychiatric orientation to person, place, and time: normal  mood and affect: normal     UTERUS:  The uterus is anteverted in position, measuring 12 0 x 4 9 x 7 9 cm  Contour and echotexture appear normal   The cervix shows no suspicious abnormality      ENDOMETRIUM:    Normal caliber of 5 mm  Homogeneous and normal in appearance      OVARIES/ADNEXA:  Right ovary: Surgically absent      Left ovary:  6 7 x 5 6 x 7 1 cm  There is a dominant 6 7 x 5 6 x 7 1 cm left ovarian cyst with homogeneous low-level internal echoes  There is lobulated cystic tubular structure adjacent to the left ovary which appears to represent hydrosalpinx  Doppler flow within normal limits      No suspicious adnexal mass or loculated collections    There is small amount of simple free fluid in the pelvis, likely physiologic in this premenopausal female      IMPRESSION:     Large complex left ovarian cystic lesion with sonographic appearance that is most suspicious for endometrioma  Less likely, this could represent functional hemorrhagic ovarian cyst   Consider it to 12 week pelvic ultrasound follow-up  If this complex   cystic lesion persists, the suspicion that this is an endometrioma would be confirmed      Left-sided hydrosalpinx

## 2021-06-01 ENCOUNTER — TELEPHONE (OUTPATIENT)
Dept: OBGYN CLINIC | Facility: MEDICAL CENTER | Age: 41
End: 2021-06-01

## 2021-06-01 NOTE — TELEPHONE ENCOUNTER
----- Message from [facility] sent at 6/1/2021  5:11 PM EDT -----  Regarding: Surgery s/c  Pt is interested in intervention for endometrioma  She is scheduled for her H&P on 06/18  Surgery target date is 07/12   Last saw Dr Saintclair Schmitt on 05/24/2021

## 2021-06-06 DIAGNOSIS — E03.9 HYPOTHYROIDISM, UNSPECIFIED TYPE: ICD-10-CM

## 2021-06-06 RX ORDER — LEVOTHYROXINE SODIUM 88 UG/1
TABLET ORAL
Qty: 30 TABLET | Refills: 0 | Status: SHIPPED | OUTPATIENT
Start: 2021-06-06 | End: 2021-07-09

## 2021-06-12 DIAGNOSIS — I10 BENIGN ESSENTIAL HTN: ICD-10-CM

## 2021-06-13 RX ORDER — OLMESARTAN MEDOXOMIL AND HYDROCHLOROTHIAZIDE 20/12.5 20; 12.5 MG/1; MG/1
TABLET ORAL
Qty: 90 TABLET | Refills: 0 | Status: SHIPPED | OUTPATIENT
Start: 2021-06-13 | End: 2021-09-08

## 2021-06-16 ENCOUNTER — PREP FOR PROCEDURE (OUTPATIENT)
Dept: OBGYN CLINIC | Facility: MEDICAL CENTER | Age: 41
End: 2021-06-16

## 2021-06-16 DIAGNOSIS — N83.202 LEFT OVARIAN CYST: Primary | ICD-10-CM

## 2021-06-18 ENCOUNTER — OFFICE VISIT (OUTPATIENT)
Dept: OBGYN CLINIC | Facility: CLINIC | Age: 41
End: 2021-06-18
Payer: COMMERCIAL

## 2021-06-18 VITALS — SYSTOLIC BLOOD PRESSURE: 110 MMHG | WEIGHT: 165 LBS | BODY MASS INDEX: 30.18 KG/M2 | DIASTOLIC BLOOD PRESSURE: 70 MMHG

## 2021-06-18 DIAGNOSIS — Z01.818 PRE-OP TESTING: ICD-10-CM

## 2021-06-18 DIAGNOSIS — N93.9 ABNORMAL UTERINE BLEEDING: ICD-10-CM

## 2021-06-18 DIAGNOSIS — N80.9 ENDOMETRIOMA: Primary | ICD-10-CM

## 2021-06-18 DIAGNOSIS — N83.202 LEFT OVARIAN CYST: ICD-10-CM

## 2021-06-18 PROBLEM — N80.129 ENDOMETRIOMA: Status: ACTIVE | Noted: 2021-06-18

## 2021-06-18 PROCEDURE — 58100 BIOPSY OF UTERUS LINING: CPT | Performed by: OBSTETRICS & GYNECOLOGY

## 2021-06-18 PROCEDURE — PREOP: Performed by: OBSTETRICS & GYNECOLOGY

## 2021-06-18 RX ORDER — SCOLOPAMINE TRANSDERMAL SYSTEM 1 MG/1
1 PATCH, EXTENDED RELEASE TRANSDERMAL
Qty: 1 PATCH | Refills: 0 | Status: ON HOLD | OUTPATIENT
Start: 2021-06-18 | End: 2021-07-12 | Stop reason: ALTCHOICE

## 2021-06-18 RX ORDER — CEFAZOLIN SODIUM 2 G/50ML
2000 SOLUTION INTRAVENOUS ONCE
Status: CANCELLED | OUTPATIENT
Start: 2021-07-12 | End: 2021-06-18

## 2021-06-18 RX ORDER — OXYCODONE HYDROCHLORIDE 5 MG/1
5 TABLET ORAL EVERY 4 HOURS PRN
Qty: 6 TABLET | Refills: 0 | Status: SHIPPED | OUTPATIENT
Start: 2021-06-18 | End: 2021-06-30

## 2021-06-18 RX ORDER — MULTIVIT-MIN/IRON FUM/FOLIC AC 7.5 MG-4
1 TABLET ORAL DAILY
COMMUNITY

## 2021-06-18 RX ORDER — SODIUM CHLORIDE, SODIUM LACTATE, POTASSIUM CHLORIDE, CALCIUM CHLORIDE 600; 310; 30; 20 MG/100ML; MG/100ML; MG/100ML; MG/100ML
125 INJECTION, SOLUTION INTRAVENOUS CONTINUOUS
Status: CANCELLED | OUTPATIENT
Start: 2021-07-12

## 2021-06-18 NOTE — H&P
Assessment /Plan:     42 y o  Misbah Mayberry with large left ovarian endometrioma and AUB for history and physical for TLH, LSO  All risks of the procedure were discussed with Agnes Colunga in detail  The risks include but are not limited to infection, bleeding, transfusion, damage to adjacent organs/nerves requiring immediate and/or delayed repair, clots inside blood vessels, need to complete procedure using alternative approach, procedural failure, worsening of pre-exisiting medical condition, and death were reviewed with patient  All alternatives to the surgery were discussed  Agnes Colunga desires to proceed with above procedure at [facility] on 7/12/2021   Consent was reviewed in detail and signed today  Surgery folder reviewed with patient in detail  Preoperative testing and antibiotics were discussed and ordered  Postoperative course discussed and post op medications were reviewed  A prescription for medication for postoperative pain was given today  Chorhexidine body wash given and instructions reviewed with patient  CC: "I have a large cyst"    HPI:  Pt is a 42 y o  [id number] with Patient's last menstrual period was 06/14/2021  for H&P  Patient has a history of an RSO in 2019 for a dermoid cyst  The pathology revealed endometriosis  She also had a D&C for AUB, menorrhagia  In 2020, she was found to have an incidental large, left ovarian cyst consistent with endometrioma  Patient was followed and the cyst remained stable  She followed closely with Dr Bebe Andino, who reviewed her options with her  Patient requested LSO, as she would like to avoid surgery in the future  She is aware of surgical menopause that would ensue after the surgery  She is aware of the medical implications as well  Patient was considering a hysterectomy for AUB at the time of her surgery  We reviewed the risks of the hysterectomy  Patient agreed to proceed with the above procedure  All questions were answered       Patient also reports increased urinary frequency  No urinary tract infection symptoms  PMHx:   Past Medical History:   Diagnosis Date    Allergic 2013    Disease of thyroid gland 2002    Hypertension     PONV (postoperative nausea and vomiting)        PSHx:   Past Surgical History:   Procedure Laterality Date     SECTION  ,     CHOLECYSTECTOMY  2014    LIVER BIOPSY      OOPHORECTOMY Right 2019    NH LAMNOTMY INCL W/DCMPRSN NRV ROOT 1 INTRSPC LUMBR Right 11/10/2020    Procedure: LAMINECTOMY LUMBAR MINIMALLY INVASIVE, L5-S1, RIGHT;  Surgeon: Kylee Newton MD;  Location:  MAIN OR;  Service: Neurosurgery       Meds:   Current Outpatient Medications:     Cholecalciferol (Vitamin D3) 50 MCG ( UT) TABS, Take 2,000 Units by mouth daily, Disp: , Rfl:     levothyroxine 88 mcg tablet, TAKE 1 TABLET BY MOUTH ONCE DAILY IN THE MORNING ON AN EMPTY STOMACH, Disp: 30 tablet, Rfl: 0    loratadine (CLARITIN) 10 mg tablet, Take 10 mg by mouth daily, Disp: , Rfl:     LORazepam (ATIVAN) 0 5 mg tablet, Take 1 tablet (0 5 mg total) by mouth every 6 (six) hours as needed for anxiety, Disp: 30 tablet, Rfl: 0    Multiple Vitamins-Minerals (multivitamin with minerals) tablet, Take 1 tablet by mouth daily, Disp: , Rfl:     olmesartan-hydrochlorothiazide (BENICAR HCT) 20-12 5 MG per tablet, Take 1 tablet by mouth once daily, Disp: 90 tablet, Rfl: 0    oxymetazoline (AFRIN) 0 05 % nasal spray, into each nostril daily , Disp: , Rfl:     albuterol (ProAir HFA) 90 mcg/act inhaler, Inhale 2 puffs every 6 (six) hours as needed for wheezing or shortness of breath (Patient not taking: Reported on 2021), Disp: 1 Inhaler, Rfl: 1    Current Facility-Administered Medications:     acetaminophen (TYLENOL) tablet 650 mg, 650 mg, Oral, Once, Kalpana Blackman MD    Allergies:    Allergies   Allergen Reactions    Other Vomiting     General Anesthesia ETT 2019, had post op N/V    Penicillins Rash       Social Hx: Social History     Socioeconomic History    Marital status: /Civil Union     Spouse name: None    Number of children: None    Years of education: None    Highest education level: None   Occupational History    None   Tobacco Use    Smoking status: Never Smoker    Smokeless tobacco: Never Used   Vaping Use    Vaping Use: Never used   Substance and Sexual Activity    Alcohol use: Not Currently     Alcohol/week: 1 0 standard drinks     Types: 1 Standard drinks or equivalent per week    Drug use: Never    Sexual activity: Yes     Partners: Male     Birth control/protection: Male Sterilization   Other Topics Concern    None   Social History Narrative    None     Social Determinants of Health     Financial Resource Strain:     Difficulty of Paying Living Expenses:    Food Insecurity:     Worried About Running Out of Food in the Last Year:     Ran Out of Food in the Last Year:    Transportation Needs:     Lack of Transportation (Medical):      Lack of Transportation (Non-Medical):    Physical Activity:     Days of Exercise per Week:     Minutes of Exercise per Session:    Stress:     Feeling of Stress :    Social Connections:     Frequency of Communication with Friends and Family:     Frequency of Social Gatherings with Friends and Family:     Attends Confucianist Services:     Active Member of Clubs or Organizations:     Attends Club or Organization Meetings:     Marital Status:    Intimate Partner Violence:     Fear of Current or Ex-Partner:     Emotionally Abused:     Physically Abused:     Sexually Abused:        Ob Hx:   OB History    Para Term  AB Living   2 2 2         SAB TAB Ectopic Multiple Live Births                  # Outcome Date GA Lbr Sebastian/2nd Weight Sex Delivery Anes PTL Lv   2 Term            1 Term                Gyn HX:  AUB, endometriosis and cyst(s)    Fm Hx:   Family History   Problem Relation Age of Onset    Arthritis Mother     Arthritis Maternal Grandmother     Parkinsonism Maternal Grandmother     Psoriasis Daughter     No Known Problems Father     No Known Problems Maternal Grandfather     No Known Problems Paternal Grandmother     No Known Problems Paternal Grandfather     No Known Problems Maternal Aunt        Review of Systems:  Constitutional :no fever, feels well, no tiredness, no recent weight gain or loss  ENT: no ear ache, no loss of hearing, no nosebleeds or nasal discharge, no sore throat or hoarseness  Cardiovascular: no complaints of slow or fast heart beat, no chest pain, no palpitations, no leg claudication or lower extremity edema  Respiratory: no complaints of shortness of shortness of breath, no LOREDO  Breasts:no complaints of breast pain, breast lump, or nipple discharge  Gastrointestinal: no complaints of abdominal pain, constipation,nausea, vomiting, or diarrhea or bloody stools  Genitourinary : as noted in HPI  Integumentary: no complaints of skin rash or lesion, itching or dry skin  Neurological: no complaints of headache, no confusion, no numbness or tingling, no dizziness or fainting    Objective        /70   Wt 74 8 kg (165 lb)   LMP 06/14/2021   BMI 30 18 kg/m²     General:   appears stated age, cooperative, alert normal mood and affect   Neck: Neck: normal, supple,trachea midline, no masses   Heart: regular rate and rhythm, S1, S2 normal, no murmur, click, rub or gallop   Lungs: clear to auscultation bilaterally   Abdomen: soft, non-tender, without masses or organomegaly   Vulva: normal, normal female genitalia, Bartholin's, Urethra, [place] normal, no lesions, normal female hair distribution   Vagina: normal vagina, no discharge, exudate, lesion, or erythema   Urethra: normal   Cervix: Normal, no discharge     Uterus: normal size, contour, position, consistency, mobility, non-tender   Adnexa: no mass, fullness, tenderness

## 2021-06-18 NOTE — H&P (VIEW-ONLY)
Assessment /Plan:     42 y o  Mat Bolanos with large left ovarian endometrioma and AUB for history and physical for TLH, LSO  All risks of the procedure were discussed with Lakesha Zazueta in detail  The risks include but are not limited to infection, bleeding, transfusion, damage to adjacent organs/nerves requiring immediate and/or delayed repair, clots inside blood vessels, need to complete procedure using alternative approach, procedural failure, worsening of pre-exisiting medical condition, and death were reviewed with patient  All alternatives to the surgery were discussed  Lakesha Zazueta desires to proceed with above procedure at [facility] on 7/12/2021   Consent was reviewed in detail and signed today  Surgery folder reviewed with patient in detail  Preoperative testing and antibiotics were discussed and ordered  Postoperative course discussed and post op medications were reviewed  A prescription for medication for postoperative pain was given today  Chorhexidine body wash given and instructions reviewed with patient  CC: "I have a large cyst"    HPI:  Pt is a 42 y o  [id number] with Patient's last menstrual period was 06/14/2021  for H&P  Patient has a history of an RSO in 2019 for a dermoid cyst  The pathology revealed endometriosis  She also had a D&C for AUB, menorrhagia  In 2020, she was found to have an incidental large, left ovarian cyst consistent with endometrioma  Patient was followed and the cyst remained stable  She followed closely with Dr Bladimir Andrea, who reviewed her options with her  Patient requested LSO, as she would like to avoid surgery in the future  She is aware of surgical menopause that would ensue after the surgery  She is aware of the medical implications as well  Patient was considering a hysterectomy for AUB at the time of her surgery  We reviewed the risks of the hysterectomy  Patient agreed to proceed with the above procedure  All questions were answered       Patient also reports increased urinary frequency  No urinary tract infection symptoms  PMHx:   Past Medical History:   Diagnosis Date    Allergic 2013    Disease of thyroid gland 2002    Hypertension     PONV (postoperative nausea and vomiting)        PSHx:   Past Surgical History:   Procedure Laterality Date     SECTION  ,     CHOLECYSTECTOMY  2014    LIVER BIOPSY      OOPHORECTOMY Right 2019    NH LAMNOTMY INCL W/DCMPRSN NRV ROOT 1 INTRSPC LUMBR Right 11/10/2020    Procedure: LAMINECTOMY LUMBAR MINIMALLY INVASIVE, L5-S1, RIGHT;  Surgeon: Tia Worrell MD;  Location:  MAIN OR;  Service: Neurosurgery       Meds:   Current Outpatient Medications:     Cholecalciferol (Vitamin D3) 50 MCG ( UT) TABS, Take 2,000 Units by mouth daily, Disp: , Rfl:     levothyroxine 88 mcg tablet, TAKE 1 TABLET BY MOUTH ONCE DAILY IN THE MORNING ON AN EMPTY STOMACH, Disp: 30 tablet, Rfl: 0    loratadine (CLARITIN) 10 mg tablet, Take 10 mg by mouth daily, Disp: , Rfl:     LORazepam (ATIVAN) 0 5 mg tablet, Take 1 tablet (0 5 mg total) by mouth every 6 (six) hours as needed for anxiety, Disp: 30 tablet, Rfl: 0    Multiple Vitamins-Minerals (multivitamin with minerals) tablet, Take 1 tablet by mouth daily, Disp: , Rfl:     olmesartan-hydrochlorothiazide (BENICAR HCT) 20-12 5 MG per tablet, Take 1 tablet by mouth once daily, Disp: 90 tablet, Rfl: 0    oxymetazoline (AFRIN) 0 05 % nasal spray, into each nostril daily , Disp: , Rfl:     albuterol (ProAir HFA) 90 mcg/act inhaler, Inhale 2 puffs every 6 (six) hours as needed for wheezing or shortness of breath (Patient not taking: Reported on 2021), Disp: 1 Inhaler, Rfl: 1    Current Facility-Administered Medications:     acetaminophen (TYLENOL) tablet 650 mg, 650 mg, Oral, Once, Terence Higgins MD    Allergies:    Allergies   Allergen Reactions    Other Vomiting     General Anesthesia ETT 2019, had post op N/V    Penicillins Rash       Social Hx: Social History     Socioeconomic History    Marital status: /Civil Union     Spouse name: None    Number of children: None    Years of education: None    Highest education level: None   Occupational History    None   Tobacco Use    Smoking status: Never Smoker    Smokeless tobacco: Never Used   Vaping Use    Vaping Use: Never used   Substance and Sexual Activity    Alcohol use: Not Currently     Alcohol/week: 1 0 standard drinks     Types: 1 Standard drinks or equivalent per week    Drug use: Never    Sexual activity: Yes     Partners: Male     Birth control/protection: Male Sterilization   Other Topics Concern    None   Social History Narrative    None     Social Determinants of Health     Financial Resource Strain:     Difficulty of Paying Living Expenses:    Food Insecurity:     Worried About Running Out of Food in the Last Year:     Ran Out of Food in the Last Year:    Transportation Needs:     Lack of Transportation (Medical):      Lack of Transportation (Non-Medical):    Physical Activity:     Days of Exercise per Week:     Minutes of Exercise per Session:    Stress:     Feeling of Stress :    Social Connections:     Frequency of Communication with Friends and Family:     Frequency of Social Gatherings with Friends and Family:     Attends Yazidi Services:     Active Member of Clubs or Organizations:     Attends Club or Organization Meetings:     Marital Status:    Intimate Partner Violence:     Fear of Current or Ex-Partner:     Emotionally Abused:     Physically Abused:     Sexually Abused:        Ob Hx:   OB History    Para Term  AB Living   2 2 2         SAB TAB Ectopic Multiple Live Births                  # Outcome Date GA Lbr Sebastian/2nd Weight Sex Delivery Anes PTL Lv   2 Term            1 Term                Gyn HX:  AUB, endometriosis and cyst(s)    Fm Hx:   Family History   Problem Relation Age of Onset    Arthritis Mother     Arthritis Maternal Grandmother     Parkinsonism Maternal Grandmother     Psoriasis Daughter     No Known Problems Father     No Known Problems Maternal Grandfather     No Known Problems Paternal Grandmother     No Known Problems Paternal Grandfather     No Known Problems Maternal Aunt        Review of Systems:  Constitutional :no fever, feels well, no tiredness, no recent weight gain or loss  ENT: no ear ache, no loss of hearing, no nosebleeds or nasal discharge, no sore throat or hoarseness  Cardiovascular: no complaints of slow or fast heart beat, no chest pain, no palpitations, no leg claudication or lower extremity edema  Respiratory: no complaints of shortness of shortness of breath, no LOREDO  Breasts:no complaints of breast pain, breast lump, or nipple discharge  Gastrointestinal: no complaints of abdominal pain, constipation,nausea, vomiting, or diarrhea or bloody stools  Genitourinary : as noted in HPI  Integumentary: no complaints of skin rash or lesion, itching or dry skin  Neurological: no complaints of headache, no confusion, no numbness or tingling, no dizziness or fainting    Objective        /70   Wt 74 8 kg (165 lb)   LMP 06/14/2021   BMI 30 18 kg/m²     General:   appears stated age, cooperative, alert normal mood and affect   Neck: Neck: normal, supple,trachea midline, no masses   Heart: regular rate and rhythm, S1, S2 normal, no murmur, click, rub or gallop   Lungs: clear to auscultation bilaterally   Abdomen: soft, non-tender, without masses or organomegaly   Vulva: normal, normal female genitalia, Bartholin's, Urethra, [place] normal, no lesions, normal female hair distribution   Vagina: normal vagina, no discharge, exudate, lesion, or erythema   Urethra: normal   Cervix: Normal, no discharge     Uterus: normal size, contour, position, consistency, mobility, non-tender   Adnexa: no mass, fullness, tenderness

## 2021-06-18 NOTE — PATIENT INSTRUCTIONS
Pre-Surgery Instructions:   Medication Instructions    Cholecalciferol (Vitamin D3) 50 MCG (2000 UT) TABS per anesthesia guidelines     levothyroxine 88 mcg tablet per anesthesia guidelines     loratadine (CLARITIN) 10 mg tablet per anesthesia guidelines     LORazepam (ATIVAN) 0 5 mg tablet per anesthesia guidelines     Multiple Vitamins-Minerals (multivitamin with minerals) tablet per anesthesia guidelines     olmesartan-hydrochlorothiazide (BENICAR HCT) 20-12 5 MG per tablet per anesthesia guidelines     oxymetazoline (AFRIN) 0 05 % nasal spray per anesthesia guidelines

## 2021-06-18 NOTE — PROGRESS NOTES
Endometrial biopsy    Date/Time: 6/18/2021 2:30 PM  Performed by: Alton White MD  Authorized by: Alton White MD   Universal Protocol:  Consent: Verbal consent obtained  Written consent obtained  Risks and benefits: risks, benefits and alternatives were discussed  Consent given by: patient  Patient understanding: patient states understanding of the procedure being performed  Patient consent: the patient's understanding of the procedure matches consent given  Procedure consent: procedure consent matches procedure scheduled  Patient identity confirmed: verbally with patient      Indication:     Indications:  Other disorder of menstruation and other abnormal bleeding from female genital tract    Procedure:     Procedure: endometrial biopsy with Pipelle      A bivalve speculum was placed in the vagina: yes      Cervix cleaned and prepped: yes      A paracervical block was performed: no      An intracervical block was performed: no      The cervix was dilated: no      Uterus sounded: yes      Uterus sound depth (cm):  8    Specimen collected: specimen collected and sent to pathology      Patient tolerated procedure well with no complications: yes

## 2021-06-23 ENCOUNTER — TELEPHONE (OUTPATIENT)
Dept: OBGYN CLINIC | Facility: MEDICAL CENTER | Age: 41
End: 2021-06-23

## 2021-06-23 LAB
PATH REPORT.SITE OF ORIGIN SPEC: NORMAL
PAYMENT PROCEDURE: NORMAL
SL AMB .: NORMAL

## 2021-06-23 NOTE — TELEPHONE ENCOUNTER
----- Message from Sejal Campbell MD sent at 6/18/2021  2:38 PM EDT -----  Please add total laparoscopic hysterectomy to her case  So it should be TLH, LSO, possible right salpingectomy

## 2021-06-30 ENCOUNTER — OFFICE VISIT (OUTPATIENT)
Dept: FAMILY MEDICINE CLINIC | Facility: HOSPITAL | Age: 41
End: 2021-06-30
Payer: COMMERCIAL

## 2021-06-30 VITALS
HEART RATE: 94 BPM | BODY MASS INDEX: 29.77 KG/M2 | HEIGHT: 63 IN | DIASTOLIC BLOOD PRESSURE: 84 MMHG | SYSTOLIC BLOOD PRESSURE: 128 MMHG | TEMPERATURE: 99.1 F | WEIGHT: 168 LBS

## 2021-06-30 DIAGNOSIS — I10 BENIGN ESSENTIAL HTN: ICD-10-CM

## 2021-06-30 DIAGNOSIS — Z23 ENCOUNTER FOR IMMUNIZATION: ICD-10-CM

## 2021-06-30 DIAGNOSIS — E03.9 HYPOTHYROIDISM, UNSPECIFIED TYPE: ICD-10-CM

## 2021-06-30 DIAGNOSIS — F43.21 GRIEF: ICD-10-CM

## 2021-06-30 DIAGNOSIS — Z00.00 ANNUAL PHYSICAL EXAM: Primary | ICD-10-CM

## 2021-06-30 DIAGNOSIS — E66.09 CLASS 1 OBESITY DUE TO EXCESS CALORIES WITH SERIOUS COMORBIDITY AND BODY MASS INDEX (BMI) OF 30.0 TO 30.9 IN ADULT: ICD-10-CM

## 2021-06-30 PROCEDURE — 3008F BODY MASS INDEX DOCD: CPT | Performed by: FAMILY MEDICINE

## 2021-06-30 PROCEDURE — 3079F DIAST BP 80-89 MM HG: CPT | Performed by: FAMILY MEDICINE

## 2021-06-30 PROCEDURE — 3074F SYST BP LT 130 MM HG: CPT | Performed by: FAMILY MEDICINE

## 2021-06-30 PROCEDURE — 1036F TOBACCO NON-USER: CPT | Performed by: FAMILY MEDICINE

## 2021-06-30 PROCEDURE — 99396 PREV VISIT EST AGE 40-64: CPT | Performed by: FAMILY MEDICINE

## 2021-06-30 NOTE — PROGRESS NOTES
Sofya Mesa MD    NAME: Lucy Carter  AGE: 42 y o  SEX: female  : 1980     DATE: 2021     Assessment and Plan:     Problem List Items Addressed This Visit     None      Visit Diagnoses     Encounter for immunization    -  Primary    Relevant Orders    Mammo screening bilateral w 3d & cad    Annual physical exam            Patient has been doing okay since the loss of her mother  No significant depression  She will let us know  Plan for total hysterectomy for endometrioma  Immunizations and preventive care screenings were discussed with patient today  Appropriate education was printed on patient's after visit summary  Counseling:  Alcohol/drug use: discussed moderation in alcohol intake, the recommendations for healthy alcohol use, and avoidance of illicit drug use  Dental Health: discussed importance of regular tooth brushing, flossing, and dental visits  · Exercise: the importance of regular exercise/physical activity was discussed  Recommend exercise 3-5 times per week for at least 30 minutes  No follow-ups on file  Chief Complaint:     Chief Complaint   Patient presents with    Annual Exam      History of Present Illness:     Adult Annual Physical   Patient here for a comprehensive physical exam  The patient reports no problems  Diet and Physical Activity  · Diet/Nutrition: well balanced diet  · Exercise: walking and 1-2 times a week on average  Depression Screening  PHQ-9 Depression Screening    PHQ-9:   Frequency of the following problems over the past two weeks:           General Health  · Sleep: gets 7-8 hours of sleep on average  · Hearing: normal - bilateral   · Vision: no vision problems  · Dental: no dental visits for >1 year         /GYN Health  · Patient is: premenopausal  · Last menstrual period:   · Contraceptive method:       Review of Systems:     Review of Systems Constitutional: Negative  Negative for activity change, appetite change, chills, diaphoresis, fatigue and fever  HENT: Negative for congestion, facial swelling and sore throat  Respiratory: Negative  Negative for apnea, cough, chest tightness and shortness of breath  Cardiovascular: Negative  Negative for chest pain and palpitations  Gastrointestinal: Negative  Negative for abdominal distention, abdominal pain, blood in stool, constipation, diarrhea and nausea  Genitourinary: Negative  Negative for difficulty urinating, dysuria, flank pain and frequency        Past Medical History:     Past Medical History:   Diagnosis Date    Allergic 2013    Disease of thyroid gland     Hypertension     PONV (postoperative nausea and vomiting)       Past Surgical History:     Past Surgical History:   Procedure Laterality Date     SECTION  ,     CHOLECYSTECTOMY  2014    LIVER BIOPSY      OOPHORECTOMY Right 2019    [place] LAMNOTMY INCL W/DCMPRSN NRV ROOT 1 INTRSPC LUMBR Right 11/10/2020    Procedure: LAMINECTOMY LUMBAR MINIMALLY INVASIVE, L5-S1, RIGHT;  Surgeon: Yuval Arboleda MD;  Location:  MAIN OR;  Service: Neurosurgery      Social History:     Social History     Socioeconomic History    Marital status: /Civil Union     Spouse name: None    Number of children: None    Years of education: None    Highest education level: None   Occupational History    None   Tobacco Use    Smoking status: Never Smoker    Smokeless tobacco: Never Used   Vaping Use    Vaping Use: Never used   Substance and Sexual Activity    Alcohol use: Not Currently     Alcohol/week: 1 0 standard drinks     Types: 1 Standard drinks or equivalent per week    Drug use: Never    Sexual activity: Yes     Partners: Male     Birth control/protection: Male Sterilization   Other Topics Concern    None   Social History Narrative    None     Social Determinants of Health     Financial Resource Strain:  Difficulty of Paying Living Expenses:    Food Insecurity:     Worried About Running Out of Food in the Last Year:     Ran Out of Food in the Last Year:    Transportation Needs:     Lack of Transportation (Medical):      Lack of Transportation (Non-Medical):    Physical Activity:     Days of Exercise per Week:     Minutes of Exercise per Session:    Stress:     Feeling of Stress :    Social Connections:     Frequency of Communication with Friends and Family:     Frequency of Social Gatherings with Friends and Family:     Attends Roman [employer] Services:     Active Member of Clubs or Organizations:     Attends Club or Organization Meetings:     Marital Status:    Intimate Partner Violence:     Fear of Current or Ex-Partner:     Emotionally Abused:     Physically Abused:     Sexually Abused:       Family History:     Family History   Problem Relation Age of Onset    Arthritis Mother     Arthritis Maternal Grandmother     Parkinsonism Maternal Grandmother     Psoriasis Daughter     No Known Problems Father     No Known Problems Maternal Grandfather     No Known Problems Paternal Grandmother     No Known Problems Paternal Grandfather     No Known Problems Maternal Aunt       Current Medications:     Current Outpatient Medications   Medication Sig Dispense Refill    albuterol (ProAir HFA) 90 mcg/act inhaler Inhale 2 puffs every 6 (six) hours as needed for wheezing or shortness of breath 1 Inhaler 1    Cholecalciferol (Vitamin D3) 50 MCG (2000 UT) TABS Take 2,000 Units by mouth daily      ELDERBERRY PO Take by mouth      levothyroxine 88 mcg tablet TAKE 1 TABLET BY MOUTH ONCE DAILY IN THE MORNING ON AN EMPTY STOMACH 30 tablet 0    loratadine (CLARITIN) 10 mg tablet Take 10 mg by mouth daily      Multiple Vitamins-Minerals (multivitamin with minerals) tablet Take 1 tablet by mouth daily      olmesartan-hydrochlorothiazide (BENICAR HCT) 20-12 5 MG per tablet Take 1 tablet by mouth once daily 90 tablet 0    oxymetazoline (AFRIN) 0 05 % nasal spray into each nostril daily       Probiotic Product (PROBIOTIC-10 PO) Take by mouth      scopolamine (TRANSDERM-SCOP) 1 5 mg/3 days TD 72 hr patch Place 1 patch on the skin every third day Apply to skin, behind the ear, the night before surgery (Patient not taking: Reported on 6/30/2021) 1 patch 0     Current Facility-Administered Medications   Medication Dose Route Frequency Provider Last Rate Last Admin    acetaminophen (TYLENOL) tablet 650 mg  650 mg Oral Once Rosie Vigil MD          Allergies: Allergies   Allergen Reactions    Other Vomiting     General Anesthesia ETT 8/30/2019, had post op N/V    Penicillins Rash      Physical Exam:     /84   Pulse 94   Temp 99 1 °F (37 3 °C)   Ht 5' 2 5" (1 588 m)   Wt 76 2 kg (168 lb)   LMP 06/14/2021   BMI 30 24 kg/m²     Physical Exam  Vitals and nursing note reviewed  Constitutional:       Appearance: Normal appearance  She is well-developed  She is obese  HENT:      Head: Normocephalic and atraumatic  Right Ear: Tympanic membrane, ear canal and external ear normal       Left Ear: Tympanic membrane, ear canal and external ear normal       Nose: Nose normal       Mouth/Throat:      Mouth: Mucous membranes are moist    Eyes:      Conjunctiva/sclera: Conjunctivae normal       Pupils: Pupils are equal, round, and reactive to light  Cardiovascular:      Rate and Rhythm: Normal rate and regular rhythm  Heart sounds: Normal heart sounds  Pulmonary:      Effort: Pulmonary effort is normal       Breath sounds: Normal breath sounds  Abdominal:      General: Bowel sounds are normal       Palpations: Abdomen is soft  Musculoskeletal:      Cervical back: Normal range of motion and neck supple  Skin:     General: Skin is warm and dry  Capillary Refill: Capillary refill takes less than 2 seconds  Neurological:      General: No focal deficit present        Mental Status: She is alert and oriented to person, place, and time  Psychiatric:         Mood and Affect: Mood normal          Behavior: Behavior normal          Thought Content:  Thought content normal          Judgment: Judgment normal           MD Helen Carballo MD

## 2021-06-30 NOTE — PATIENT INSTRUCTIONS

## 2021-07-01 ENCOUNTER — TELEPHONE (OUTPATIENT)
Dept: OBGYN CLINIC | Facility: MEDICAL CENTER | Age: 41
End: 2021-07-01

## 2021-07-01 NOTE — TELEPHONE ENCOUNTER
Patient called stating when she went for a physical at her PCP he accidentally deleted her pain med for after surgery, patient requesting if you can order it again

## 2021-07-02 DIAGNOSIS — N80.9 ENDOMETRIOMA: Primary | ICD-10-CM

## 2021-07-02 RX ORDER — OXYCODONE HYDROCHLORIDE 5 MG/1
5 TABLET ORAL EVERY 4 HOURS PRN
Qty: 10 TABLET | Refills: 0 | Status: SHIPPED | OUTPATIENT
Start: 2021-07-02 | End: 2021-09-14

## 2021-07-07 ENCOUNTER — ANESTHESIA EVENT (OUTPATIENT)
Dept: PERIOP | Facility: HOSPITAL | Age: 41
End: 2021-07-07
Payer: COMMERCIAL

## 2021-07-07 NOTE — PRE-PROCEDURE INSTRUCTIONS
Pre-Surgery Instructions:   Medication Instructions    albuterol (ProAir HFA) 90 mcg/act inhaler Instructed patient per Anesthesia Guidelines  Use as directed if needed    Cholecalciferol (Vitamin D3) 50 MCG (2000 UT) TABS Instructed patient per Anesthesia Guidelines  Stopped 7/4    ELDERBERRY PO Instructed patient per Anesthesia Guidelines  Stopped 7/4    levothyroxine 88 mcg tablet Instructed patient per Anesthesia Guidelines  Take morning of surgery with sip water    loratadine (CLARITIN) 10 mg tablet Instructed patient per Anesthesia Guidelines  Take morning of surgery with sip water    Multiple Vitamins-Minerals (multivitamin with minerals) tablet Instructed patient per Anesthesia Guidelines  Stopped 7/4    olmesartan-hydrochlorothiazide (BENICAR HCT) 20-12 5 MG per tablet Instructed patient per Anesthesia Guidelines  Do not take morning of surgery    oxymetazoline (AFRIN) 0 05 % nasal spray Instructed patient per Anesthesia Guidelines  Tale as directed    Probiotic Product (PROBIOTIC-10 PO) Instructed patient per Anesthesia Guidelines  Stopped 7/4   Covid screening negative as per patient  Fully vaccinated  Reviewed pre op medicine and showering instructions with patient via phone call, verbalizes understanding  Advised patient to stop taking vitamins, herbal meds, NSAID'S and ASA pre op but may take Tylenol products if needed  Advised NPO after MN (7/11) and ASC will call (7/9) with surgical scheduled time  Pt verbalized understanding

## 2021-07-07 NOTE — TELEPHONE ENCOUNTER
Checked with coding - CPT 60480 added to the form and re-faxed  [facility] does not do PA over the phone  They only do faxed forms

## 2021-07-08 NOTE — TELEPHONE ENCOUNTER
[facility] today, again  Spoke to rep Jose Cruz See  States they did not get our faxes even though I am getting fax confirmation numbers  Fax # was confirmed  She was able to open a case # [id number]  I tried faxing clinicals again today  She is going to call me back once the clinicals are received

## 2021-07-09 DIAGNOSIS — E03.9 HYPOTHYROIDISM, UNSPECIFIED TYPE: ICD-10-CM

## 2021-07-09 LAB
ALBUMIN SERPL-MCNC: 4.5 G/DL (ref 3.8–4.8)
ALBUMIN/GLOB SERPL: 1.7 {RATIO} (ref 1.2–2.2)
ALP SERPL-CCNC: 69 IU/L (ref 48–121)
ALT SERPL-CCNC: 13 IU/L (ref 0–32)
AST SERPL-CCNC: 16 IU/L (ref 0–40)
BILIRUB SERPL-MCNC: 0.3 MG/DL (ref 0–1.2)
BUN SERPL-MCNC: 9 MG/DL (ref 6–24)
BUN/CREAT SERPL: 12 (ref 9–23)
CALCIUM SERPL-MCNC: 9.8 MG/DL (ref 8.7–10.2)
CHLORIDE SERPL-SCNC: 102 MMOL/L (ref 96–106)
CHOLEST SERPL-MCNC: 236 MG/DL (ref 100–199)
CO2 SERPL-SCNC: 24 MMOL/L (ref 20–29)
CREAT SERPL-MCNC: 0.76 MG/DL (ref 0.57–1)
ERYTHROCYTE [DISTWIDTH] IN BLOOD BY AUTOMATED COUNT: 14.3 % (ref 11.7–15.4)
GLOBULIN SER-MCNC: 2.6 G/DL (ref 1.5–4.5)
GLUCOSE SERPL-MCNC: 91 MG/DL (ref 65–99)
HCT VFR BLD AUTO: 39.7 % (ref 34–46.6)
HDLC SERPL-MCNC: 44 MG/DL
HGB BLD-MCNC: 12.9 G/DL (ref 11.1–15.9)
LDLC SERPL CALC-MCNC: 150 MG/DL (ref 0–99)
LDLC/HDLC SERPL: 3.4 RATIO (ref 0–3.2)
MCH RBC QN AUTO: 26.7 PG (ref 26.6–33)
MCHC RBC AUTO-ENTMCNC: 32.5 G/DL (ref 31.5–35.7)
MCV RBC AUTO: 82 FL (ref 79–97)
PLATELET # BLD AUTO: 336 X10E3/UL (ref 150–450)
POTASSIUM SERPL-SCNC: 3.8 MMOL/L (ref 3.5–5.2)
PROT SERPL-MCNC: 7.1 G/DL (ref 6–8.5)
RBC # BLD AUTO: 4.83 X10E6/UL (ref 3.77–5.28)
SL AMB EGFR AFRICAN AMERICAN: 112 ML/MIN/1.73
SL AMB EGFR NON AFRICAN AMERICAN: 97 ML/MIN/1.73
SL AMB VLDL CHOLESTEROL CALC: 42 MG/DL (ref 5–40)
SODIUM SERPL-SCNC: 140 MMOL/L (ref 134–144)
TRIGL SERPL-MCNC: 231 MG/DL (ref 0–149)
TSH SERPL DL<=0.005 MIU/L-ACNC: 3.51 UIU/ML (ref 0.45–4.5)
WBC # BLD AUTO: 5.4 X10E3/UL (ref 3.4–10.8)

## 2021-07-09 RX ORDER — LEVOTHYROXINE SODIUM 88 UG/1
TABLET ORAL
Qty: 30 TABLET | Refills: 0 | Status: SHIPPED | OUTPATIENT
Start: 2021-07-09 | End: 2021-08-09

## 2021-07-12 ENCOUNTER — ANESTHESIA (OUTPATIENT)
Dept: PERIOP | Facility: HOSPITAL | Age: 41
End: 2021-07-12
Payer: COMMERCIAL

## 2021-07-12 ENCOUNTER — HOSPITAL ENCOUNTER (OUTPATIENT)
Facility: HOSPITAL | Age: 41
Setting detail: OUTPATIENT SURGERY
Discharge: HOME | End: 2021-07-12
Attending: OBSTETRICS & GYNECOLOGY | Admitting: OBSTETRICS & GYNECOLOGY
Payer: COMMERCIAL

## 2021-07-12 VITALS
WEIGHT: 168 LBS | HEART RATE: 77 BPM | DIASTOLIC BLOOD PRESSURE: 77 MMHG | HEIGHT: 63 IN | SYSTOLIC BLOOD PRESSURE: 117 MMHG | OXYGEN SATURATION: 96 % | RESPIRATION RATE: 16 BRPM | BODY MASS INDEX: 29.77 KG/M2 | TEMPERATURE: 97.7 F

## 2021-07-12 DIAGNOSIS — N83.202 LEFT OVARIAN CYST: ICD-10-CM

## 2021-07-12 PROBLEM — Z90.710 S/P LAPAROSCOPIC HYSTERECTOMY: Status: ACTIVE | Noted: 2021-07-12

## 2021-07-12 LAB
ABO GROUP BLD: NORMAL
ABO GROUP BLD: NORMAL
BASOPHILS # BLD AUTO: 0.05 THOUSANDS/ΜL (ref 0–0.1)
BASOPHILS NFR BLD AUTO: 1 % (ref 0–1)
BLD GP AB SCN SERPL QL: NEGATIVE
EOSINOPHIL # BLD AUTO: 0.15 THOUSAND/ΜL (ref 0–0.61)
EOSINOPHIL NFR BLD AUTO: 3 % (ref 0–6)
ERYTHROCYTE [DISTWIDTH] IN BLOOD BY AUTOMATED COUNT: 14 % (ref 11.6–15.1)
EXT PREGNANCY TEST URINE: NEGATIVE
EXT. CONTROL: NORMAL
HCG SERPL QL: NEGATIVE
HCT VFR BLD AUTO: 36.5 % (ref 34.8–46.1)
HGB BLD-MCNC: 11.9 G/DL (ref 11.5–15.4)
IMM GRANULOCYTES # BLD AUTO: 0.01 THOUSAND/UL (ref 0–0.2)
IMM GRANULOCYTES NFR BLD AUTO: 0 % (ref 0–2)
LYMPHOCYTES # BLD AUTO: 1.98 THOUSANDS/ΜL (ref 0.6–4.47)
LYMPHOCYTES NFR BLD AUTO: 41 % (ref 14–44)
MCH RBC QN AUTO: 27.2 PG (ref 26.8–34.3)
MCHC RBC AUTO-ENTMCNC: 32.6 G/DL (ref 31.4–37.4)
MCV RBC AUTO: 84 FL (ref 82–98)
MONOCYTES # BLD AUTO: 0.35 THOUSAND/ΜL (ref 0.17–1.22)
MONOCYTES NFR BLD AUTO: 7 % (ref 4–12)
NEUTROPHILS # BLD AUTO: 2.26 THOUSANDS/ΜL (ref 1.85–7.62)
NEUTS SEG NFR BLD AUTO: 48 % (ref 43–75)
NRBC BLD AUTO-RTO: 0 /100 WBCS
PLATELET # BLD AUTO: 298 THOUSANDS/UL (ref 149–390)
PMV BLD AUTO: 10 FL (ref 8.9–12.7)
RBC # BLD AUTO: 4.37 MILLION/UL (ref 3.81–5.12)
RH BLD: POSITIVE
RH BLD: POSITIVE
SPECIMEN EXPIRATION DATE: NORMAL
WBC # BLD AUTO: 4.8 THOUSAND/UL (ref 4.31–10.16)

## 2021-07-12 PROCEDURE — 84703 CHORIONIC GONADOTROPIN ASSAY: CPT | Performed by: OBSTETRICS & GYNECOLOGY

## 2021-07-12 PROCEDURE — 85025 COMPLETE CBC W/AUTO DIFF WBC: CPT | Performed by: OBSTETRICS & GYNECOLOGY

## 2021-07-12 PROCEDURE — 81025 URINE PREGNANCY TEST: CPT | Performed by: OBSTETRICS & GYNECOLOGY

## 2021-07-12 PROCEDURE — 86901 BLOOD TYPING SEROLOGIC RH(D): CPT | Performed by: NURSE ANESTHETIST, CERTIFIED REGISTERED

## 2021-07-12 PROCEDURE — 88307 TISSUE EXAM BY PATHOLOGIST: CPT | Performed by: PATHOLOGY

## 2021-07-12 PROCEDURE — 58571 TLH W/T/O 250 G OR LESS: CPT | Performed by: OBSTETRICS & GYNECOLOGY

## 2021-07-12 PROCEDURE — 86900 BLOOD TYPING SEROLOGIC ABO: CPT | Performed by: NURSE ANESTHETIST, CERTIFIED REGISTERED

## 2021-07-12 PROCEDURE — 86850 RBC ANTIBODY SCREEN: CPT | Performed by: NURSE ANESTHETIST, CERTIFIED REGISTERED

## 2021-07-12 RX ORDER — ACETAMINOPHEN 325 MG/1
650 TABLET ORAL EVERY 6 HOURS PRN
Status: DISCONTINUED | OUTPATIENT
Start: 2021-07-12 | End: 2021-07-12 | Stop reason: HOSPADM

## 2021-07-12 RX ORDER — NEOSTIGMINE METHYLSULFATE 1 MG/ML
INJECTION INTRAVENOUS AS NEEDED
Status: DISCONTINUED | OUTPATIENT
Start: 2021-07-12 | End: 2021-07-12

## 2021-07-12 RX ORDER — SODIUM CHLORIDE 9 MG/ML
125 INJECTION, SOLUTION INTRAVENOUS CONTINUOUS
Status: DISCONTINUED | OUTPATIENT
Start: 2021-07-12 | End: 2021-07-12 | Stop reason: HOSPADM

## 2021-07-12 RX ORDER — MAGNESIUM HYDROXIDE 1200 MG/15ML
LIQUID ORAL AS NEEDED
Status: DISCONTINUED | OUTPATIENT
Start: 2021-07-12 | End: 2021-07-12 | Stop reason: HOSPADM

## 2021-07-12 RX ORDER — DOCUSATE SODIUM 100 MG/1
100 CAPSULE, LIQUID FILLED ORAL 2 TIMES DAILY
Status: DISCONTINUED | OUTPATIENT
Start: 2021-07-12 | End: 2021-07-12 | Stop reason: HOSPADM

## 2021-07-12 RX ORDER — ROCURONIUM BROMIDE 10 MG/ML
INJECTION, SOLUTION INTRAVENOUS AS NEEDED
Status: DISCONTINUED | OUTPATIENT
Start: 2021-07-12 | End: 2021-07-12

## 2021-07-12 RX ORDER — PROPOFOL 10 MG/ML
INJECTION, EMULSION INTRAVENOUS AS NEEDED
Status: DISCONTINUED | OUTPATIENT
Start: 2021-07-12 | End: 2021-07-12

## 2021-07-12 RX ORDER — CEFAZOLIN SODIUM 2 G/50ML
2000 SOLUTION INTRAVENOUS ONCE
Status: COMPLETED | OUTPATIENT
Start: 2021-07-12 | End: 2021-07-12

## 2021-07-12 RX ORDER — SCOLOPAMINE TRANSDERMAL SYSTEM 1 MG/1
1 PATCH, EXTENDED RELEASE TRANSDERMAL ONCE AS NEEDED
Status: DISCONTINUED | OUTPATIENT
Start: 2021-07-12 | End: 2021-07-12

## 2021-07-12 RX ORDER — BUPIVACAINE HYDROCHLORIDE 2.5 MG/ML
INJECTION, SOLUTION EPIDURAL; INFILTRATION; INTRACAUDAL AS NEEDED
Status: DISCONTINUED | OUTPATIENT
Start: 2021-07-12 | End: 2021-07-12 | Stop reason: HOSPADM

## 2021-07-12 RX ORDER — DEXAMETHASONE SODIUM PHOSPHATE 4 MG/ML
INJECTION, SOLUTION INTRA-ARTICULAR; INTRALESIONAL; INTRAMUSCULAR; INTRAVENOUS; SOFT TISSUE AS NEEDED
Status: DISCONTINUED | OUTPATIENT
Start: 2021-07-12 | End: 2021-07-12

## 2021-07-12 RX ORDER — GLYCOPYRROLATE 0.2 MG/ML
INJECTION INTRAMUSCULAR; INTRAVENOUS AS NEEDED
Status: DISCONTINUED | OUTPATIENT
Start: 2021-07-12 | End: 2021-07-12

## 2021-07-12 RX ORDER — ONDANSETRON 2 MG/ML
INJECTION INTRAMUSCULAR; INTRAVENOUS AS NEEDED
Status: DISCONTINUED | OUTPATIENT
Start: 2021-07-12 | End: 2021-07-12

## 2021-07-12 RX ORDER — OXYCODONE HYDROCHLORIDE 5 MG/1
10 TABLET ORAL EVERY 4 HOURS PRN
Status: DISCONTINUED | OUTPATIENT
Start: 2021-07-12 | End: 2021-07-12 | Stop reason: HOSPADM

## 2021-07-12 RX ORDER — IBUPROFEN 600 MG/1
600 TABLET ORAL EVERY 6 HOURS PRN
Status: DISCONTINUED | OUTPATIENT
Start: 2021-07-12 | End: 2021-07-12 | Stop reason: HOSPADM

## 2021-07-12 RX ORDER — FENTANYL CITRATE 50 UG/ML
INJECTION, SOLUTION INTRAMUSCULAR; INTRAVENOUS AS NEEDED
Status: DISCONTINUED | OUTPATIENT
Start: 2021-07-12 | End: 2021-07-12

## 2021-07-12 RX ORDER — HYDROMORPHONE HCL 110MG/55ML
PATIENT CONTROLLED ANALGESIA SYRINGE INTRAVENOUS AS NEEDED
Status: DISCONTINUED | OUTPATIENT
Start: 2021-07-12 | End: 2021-07-12

## 2021-07-12 RX ORDER — HYDROMORPHONE HCL/PF 1 MG/ML
0.5 SYRINGE (ML) INJECTION
Status: DISCONTINUED | OUTPATIENT
Start: 2021-07-12 | End: 2021-07-12 | Stop reason: HOSPADM

## 2021-07-12 RX ORDER — ONDANSETRON 2 MG/ML
4 INJECTION INTRAMUSCULAR; INTRAVENOUS ONCE AS NEEDED
Status: DISCONTINUED | OUTPATIENT
Start: 2021-07-12 | End: 2021-07-12 | Stop reason: HOSPADM

## 2021-07-12 RX ORDER — OXYCODONE HYDROCHLORIDE 5 MG/1
5 TABLET ORAL EVERY 4 HOURS PRN
Status: DISCONTINUED | OUTPATIENT
Start: 2021-07-12 | End: 2021-07-12 | Stop reason: HOSPADM

## 2021-07-12 RX ORDER — SODIUM CHLORIDE 9 MG/ML
INJECTION, SOLUTION INTRAVENOUS AS NEEDED
Status: DISCONTINUED | OUTPATIENT
Start: 2021-07-12 | End: 2021-07-12 | Stop reason: HOSPADM

## 2021-07-12 RX ORDER — LIDOCAINE HYDROCHLORIDE 20 MG/ML
INJECTION, SOLUTION EPIDURAL; INFILTRATION; INTRACAUDAL; PERINEURAL AS NEEDED
Status: DISCONTINUED | OUTPATIENT
Start: 2021-07-12 | End: 2021-07-12

## 2021-07-12 RX ORDER — ONDANSETRON 2 MG/ML
4 INJECTION INTRAMUSCULAR; INTRAVENOUS EVERY 6 HOURS PRN
Status: DISCONTINUED | OUTPATIENT
Start: 2021-07-12 | End: 2021-07-12 | Stop reason: HOSPADM

## 2021-07-12 RX ORDER — MIDAZOLAM HYDROCHLORIDE 2 MG/2ML
INJECTION, SOLUTION INTRAMUSCULAR; INTRAVENOUS AS NEEDED
Status: DISCONTINUED | OUTPATIENT
Start: 2021-07-12 | End: 2021-07-12

## 2021-07-12 RX ADMIN — PROPOFOL 200 MG: 10 INJECTION, EMULSION INTRAVENOUS at 10:15

## 2021-07-12 RX ADMIN — ONDANSETRON 4 MG: 2 INJECTION INTRAMUSCULAR; INTRAVENOUS at 10:16

## 2021-07-12 RX ADMIN — HYDROMORPHONE HYDROCHLORIDE 0.5 MG: 2 INJECTION INTRAMUSCULAR; INTRAVENOUS; SUBCUTANEOUS at 12:39

## 2021-07-12 RX ADMIN — DEXAMETHASONE SODIUM PHOSPHATE 4 MG: 4 INJECTION INTRA-ARTICULAR; INTRALESIONAL; INTRAMUSCULAR; INTRAVENOUS; SOFT TISSUE at 10:20

## 2021-07-12 RX ADMIN — HYDROMORPHONE HYDROCHLORIDE 0.3 MG: 2 INJECTION INTRAMUSCULAR; INTRAVENOUS; SUBCUTANEOUS at 12:12

## 2021-07-12 RX ADMIN — SCOPALAMINE 1 PATCH: 1 PATCH, EXTENDED RELEASE TRANSDERMAL at 08:57

## 2021-07-12 RX ADMIN — HYDROMORPHONE HYDROCHLORIDE 0.5 MG: 2 INJECTION INTRAMUSCULAR; INTRAVENOUS; SUBCUTANEOUS at 12:54

## 2021-07-12 RX ADMIN — ROCURONIUM BROMIDE 50 MG: 50 INJECTION, SOLUTION INTRAVENOUS at 10:16

## 2021-07-12 RX ADMIN — CEFAZOLIN SODIUM 2000 MG: 2 SOLUTION INTRAVENOUS at 10:00

## 2021-07-12 RX ADMIN — HYDROMORPHONE HYDROCHLORIDE 0.2 MG: 2 INJECTION INTRAMUSCULAR; INTRAVENOUS; SUBCUTANEOUS at 12:24

## 2021-07-12 RX ADMIN — GLYCOPYRROLATE 0.4 MG: 0.2 INJECTION, SOLUTION INTRAMUSCULAR; INTRAVENOUS at 13:37

## 2021-07-12 RX ADMIN — HYDROMORPHONE HYDROCHLORIDE 0.5 MG: 2 INJECTION INTRAMUSCULAR; INTRAVENOUS; SUBCUTANEOUS at 12:45

## 2021-07-12 RX ADMIN — FENTANYL CITRATE 100 MCG: 50 INJECTION INTRAMUSCULAR; INTRAVENOUS at 10:19

## 2021-07-12 RX ADMIN — FENTANYL CITRATE 100 MCG: 50 INJECTION INTRAMUSCULAR; INTRAVENOUS at 10:37

## 2021-07-12 RX ADMIN — LIDOCAINE HYDROCHLORIDE 100 MG: 20 INJECTION, SOLUTION EPIDURAL; INFILTRATION; INTRACAUDAL; PERINEURAL at 10:15

## 2021-07-12 RX ADMIN — MIDAZOLAM 2 MG: 1 INJECTION INTRAMUSCULAR; INTRAVENOUS at 10:07

## 2021-07-12 RX ADMIN — SODIUM CHLORIDE 125 ML/HR: 0.9 INJECTION, SOLUTION INTRAVENOUS at 09:09

## 2021-07-12 RX ADMIN — NEOSTIGMINE METHYLSULFATE 3 MG: 1 INJECTION INTRAVENOUS at 13:37

## 2021-07-12 NOTE — OP NOTE
OPERATIVE REPORT  PATIENT NAME: Zayra Eric    :  1980  MRN: [id number]  Pt Location: [facility] OR ROOM 05    SURGERY DATE: 2021    Surgeon(s) and Role:     * Xiao Burks MD - Primary     * Freeda Schirmer, MD - Assisting    Preop Diagnosis:  Left ovarian cyst [N83 202]    Post-Op Diagnosis Codes:     * Left ovarian cyst [N83 202]    Procedure(s) (LRB):  SALPINGECTOMY, LAP; oophrectomy (Left)  Dx  Lap  (N/A)  LTH, cystoscopy (N/A)    Specimen(s):  ID Type Source Tests Collected by Time Destination   1 : uterus, cervix, left fallopian tube and ovary Tissue Uterus TISSUE EXAM Xiao Burks MD 2021 1247        Estimated Blood Loss:   250 mL    Drains:  [REMOVED] Urethral Catheter Non-latex 16 Fr  (Removed)   Number of days: 0       Anesthesia Type:   General    Operative Indications:  Left ovarian cyst [N83 202]  Abnormal uterine bleeding    Operative Findings:  Normal appearing external genitalia  Anterverted uterus, sounded to 10cm  Small lesions of endometriosis noted throughout pelvis  Right fallopian tube and ovary surgically absent  Large left ovarian cyst  Marked dilated, left fallopian tube  Enlarged, tortuous uterine vessels  Adhesions between anterior uterus and bladder  Normal appearing bladder with bilateral ureteral jets noted on cystoscopy    Complications:  None    Procedure and Technique:  The patient was taken to the operating room where she was properly identified and general anesthesia was obtained without difficulty  The patient was given prophylactic intravenous antibiotics  The patient was prepped and draped in the usual sterile manner in the dorsal lithotomy position using the stirrups  Care was taken to avoid excessive flexion or extension of the patient's hips and knees and pressure on her extremities  A time out was performed and the above information confirmed  A agrawal catheter was inserted into the bladder and a weighted speculum was placed into the vagina   The Partha retractor was placed into the vagina, and the anterior portion of the cervix was grasped with a single tooth tenaculum  The uterus sounded to 10cm  Stay suture was placed on the anterior lip of the cervix and the Rachel manipulator was placed  The single tooth tenaculum and the weighted speculum were then removed from the vagina  The Rachel tip and vaginal cuff occluder were inflated  Surgeons gloves were then changed, and attention was then turned to the abdomen, where local was injected infraumbilically  A small incision was then made infraumbilically, and a 5mm trocar and sleeve were inserted through the incision into the peritoneum without difficulty under direct visualization  Laparoscopic visualization confirmed the intraperitoneal insertion of the port  Pneumoperitoneum was then obtained to a maximum of 15mmHg using carbon dioxide  Subsequently, after infiltrating the areas with local, two additional small incisions were made in both the right and left lower quadrants, approximately 3 cm above and 3 cm medial to the anterior superior iliac spine  Two ports (5mm, 5mm) were introduced under direct visualization  The patient was placed in trendelenburg, and attention was then turned to the uterine fundus, which was grasped at the level of the fallopian tube  The left ureter was identified to be well out of the surgical field  The Enseal dissecting instrument was then used to coagulate and cut the round ligament with subsequent coagulation and cutting of the left utero-ovarian ligament  The left IP ligament was also cauterized and cut  The ovary with large cyst was removed and placed in the upper abdomen  The mesosalpinx was then coagulated and cut using the Enseal dissecting instrument and the left fallopian tube was amputated 2cm from the cornua and placed in the upper abdomen  The broad ligament was then coagulated and cut to the level of the uterine arteries   The Enseal was then used to coagulate the uterine artery along the left  The instruments were then switched in the ports and using the Enseal, the right broad ligament was then coagulated and cut to the level of the uterine arteries  The Enseal was used to coagulate the uterine artery on the right  Due to difficulty delineating the bladder, the bladder was backfilled  The bladder flap was then adequately delineated for development of the bladder flap and extended across the uterus  Laparoscopic peanuts were then used to bluntly dissect the bladder down further  The cardinal ligaments were then cauterized and cut used the Enseal dissecting instrument bilaterally  At this point, the uterus was visualized and noted to be blanched  The colpotomy was performed with the monopolar hook  Once the colpotomy was completed, the attention was then turned back to the perineum where the vaginal cuff occluder was deflated, and the uterus and cervix were removed from the abdomen  The left fallopian tube and left ovary were removed vaginally with a laparoscopic bag  The vaginal cuff was closed  using 0-Vicryl  A figure of eight was placed at each angle  The remainder of the cuff was closed with a figure of eight stitches  Airtight closure and excellent hemostasis was obtained  Gloves were exchanged and attention was returned to the abdomen  The abdomen and pelvis was visualized, irrigated, and good hemostasis was noted  Good hemostasis was once again noted  Pneumoperitoneum was then evacuated and the trocars were removed  The trocar incisions were closed using 4 0 monocryl and exofin was placed  The agrawal was removed and cystoscopy was performed  Bilateral ureteral jets were visualized, as was the bladder dome  No damage to the bladder was visualized on cystoscopy  The bladder was drained and the cystoscope was removed  All instruments were then removed from the vagina      The patient tolerated the procedure well and was transferred to the recovery room in stable condition  All needle, sponge, and instrument counts were noted to be correct x 2 at the end of the procedure       Winston Franco MD, PGY-4  7/12/2021  6:58 PM

## 2021-07-12 NOTE — DISCHARGE INSTRUCTIONS
Laparoscopic Hysterectomy   WHAT YOU SHOULD KNOW:   Laparoscopic hysterectomy [facility]) is surgery to remove your uterus only (partial hysterectomy), or your uterus and cervix (total hysterectomy)  Other organs, such as your ovaries and fallopian tubes, may also be removed  AFTER YOU LEAVE:   Medicines:   · Medicines  may be given to decrease pain or to treat or prevent a bacterial infection  Ask your primary healthcare provider [facility]) how to take prescription pain medicine safely  You may also need to take hormone medicine, such as estrogen  · Take your medicine as directed  Contact your PHP if you think your medicine is not helping or if you have side effects  Tell him if you are allergic to any medicine  Keep a list of the medicines, vitamins, and herbs you take  Include the amounts, and when and why you take them  Bring the list or the pill bottles to follow-up visits  Carry your medicine list with you in case of an emergency  Activity guidelines: You may feel like resting more after surgery  Slowly start to do more each day  Rest when you feel it is needed  Ask when you can return to your usual activities  What to expect after surgery:   · Ask your gynecologist or PHP about routine tests that you will need  · It is normal to have some bleeding from your vagina after certain types of hysterectomy surgery  Ask your gynecologist or PHP if you should expect bleeding, and how much bleeding to expect  Contact your gynecologist or PHP if:   · You have a fever  · You have pain that gets worse or does not decrease or go away with treatment  · You notice pus or a foul-smelling drainage coming from an incision, or it is red or swollen  · You have new or more bright red bleeding from your vagina or your incisions  · You have yellow, green, or foul-smelling discharge coming from your vagina  · You have trouble urinating, burning when you urinate, or feel a need to urinate often      · You have trouble having a bowel movement  · Your skin is itchy, swollen, or has a rash  · You have questions or concerns about your condition or care  Seek care immediately or call 911 if:   · You are bleeding from your vagina, or bleeding more than you were told to expect  · Your arm or leg feels warm, tender, and painful  It may look swollen and red  · You feel lightheaded, short of breath, and have chest pain  · You cough up blood  © 2014 [address] is for End User's use only and may not be sold, redistributed or otherwise used for commercial purposes  All illustrations and images included in CareNotes® are the copyrighted property of A D A M [employer] Inc  or Jose Miguel Silveira  The above information is an  only  It is not intended as medical advice for individual conditions or treatments  Talk to your doctor, nurse or pharmacist before following any medical regimen to see if it is safe and effective for you

## 2021-07-12 NOTE — ANESTHESIA PREPROCEDURE EVALUATION
Procedure:  SALPINGECTOMY, LAP; POSS RIGHT SLAPINGECTOMY (Left Uterus)  Dx  Lap  (N/A Uterus)  LTH (N/A Abdomen)  Ex  Lap  (N/A Abdomen)    Relevant Problems   ANESTHESIA   (+) PONV (postoperative nausea and vomiting)      CARDIO   (+) Benign essential HTN      ENDO   (+) Hypothyroidism      MUSCULOSKELETAL   (+) Chronic right-sided low back pain without sciatica   (+) Primary osteoarthritis of right knee      NEURO/PSYCH   (+) Chronic pain syndrome      Other   (+) Ovarian cyst, right   (+) Radiculopathy, lumbar region        Physical Exam    Airway    Mallampati score: II  TM Distance: >3 FB  Neck ROM: full     Dental   No notable dental hx     Cardiovascular  Rhythm: regular, Rate: normal, Cardiovascular exam normal    Pulmonary  Pulmonary exam normal Breath sounds clear to auscultation,     Other Findings        Anesthesia Plan  ASA Score- 2     Anesthesia Type- general with ASA Monitors  Additional Monitors:   Airway Plan: ETT  Comment: SCOP patch ordered  Plan Factors-    Chart reviewed  Existing labs reviewed  Patient summary reviewed  Patient is not a current smoker  Patient instructed to abstain from smoking on day of procedure  Patient did not smoke on day of surgery  Induction- intravenous  Postoperative Plan- Plan for postoperative opioid use  Planned trial extubation    Informed Consent- Anesthetic plan and risks discussed with patient and spouse García Meza

## 2021-07-12 NOTE — INTERVAL H&P NOTE
H&P reviewed  After examining the patient I find no changes in the patients condition since the H&P had been written      Vitals:    07/12/21 0819   BP: 146/89   Pulse: 102   Temp: 98 2 °F (36 8 °C)   SpO2: 100%

## 2021-07-15 ENCOUNTER — TELEPHONE (OUTPATIENT)
Dept: OBGYN CLINIC | Facility: MEDICAL CENTER | Age: 41
End: 2021-07-15

## 2021-07-15 NOTE — TELEPHONE ENCOUNTER
Pt having clear discharge from wounds had surgery 7/12 would like to know if normal slightly red and itchy  Please review and contact patient

## 2021-07-15 NOTE — TELEPHONE ENCOUNTER
returned call to patient  C/O incision leaking clear, slightly yellow SS drainage  States that incisions are all well approximated  Denies redness, warmth, fever or any symptoms of infection  Advised to monitor for symptoms of infection  Keep incisions clean and dry    Call back if any changes or no improvement

## 2021-07-19 ENCOUNTER — TELEPHONE (OUTPATIENT)
Dept: FAMILY MEDICINE CLINIC | Facility: HOSPITAL | Age: 41
End: 2021-07-19

## 2021-07-19 ENCOUNTER — OFFICE VISIT (OUTPATIENT)
Dept: FAMILY MEDICINE CLINIC | Facility: HOSPITAL | Age: 41
End: 2021-07-19
Payer: COMMERCIAL

## 2021-07-19 VITALS
SYSTOLIC BLOOD PRESSURE: 120 MMHG | OXYGEN SATURATION: 98 % | HEART RATE: 100 BPM | WEIGHT: 165.8 LBS | DIASTOLIC BLOOD PRESSURE: 70 MMHG | TEMPERATURE: 98.8 F | HEIGHT: 63 IN | BODY MASS INDEX: 29.38 KG/M2

## 2021-07-19 DIAGNOSIS — Z90.710 S/P LAPAROSCOPIC HYSTERECTOMY: ICD-10-CM

## 2021-07-19 DIAGNOSIS — L23.1 ALLERGIC CONTACT DERMATITIS DUE TO ADHESIVES: Primary | ICD-10-CM

## 2021-07-19 PROCEDURE — 3078F DIAST BP <80 MM HG: CPT | Performed by: NURSE PRACTITIONER

## 2021-07-19 PROCEDURE — 99213 OFFICE O/P EST LOW 20 MIN: CPT | Performed by: NURSE PRACTITIONER

## 2021-07-19 PROCEDURE — 3074F SYST BP LT 130 MM HG: CPT | Performed by: NURSE PRACTITIONER

## 2021-07-19 PROCEDURE — 3008F BODY MASS INDEX DOCD: CPT | Performed by: NURSE PRACTITIONER

## 2021-07-19 PROCEDURE — 1036F TOBACCO NON-USER: CPT | Performed by: NURSE PRACTITIONER

## 2021-07-19 RX ORDER — BETAMETHASONE DIPROPIONATE 0.5 MG/G
OINTMENT TOPICAL 2 TIMES DAILY
Qty: 45 G | Refills: 0 | Status: SHIPPED | OUTPATIENT
Start: 2021-07-19 | End: 2021-09-14

## 2021-07-19 RX ORDER — TRIAMCINOLONE ACETONIDE 0.25 MG/G
OINTMENT TOPICAL 2 TIMES DAILY
Qty: 45 G | Refills: 0 | Status: SHIPPED | OUTPATIENT
Start: 2021-07-19 | End: 2021-07-29

## 2021-07-19 NOTE — TELEPHONE ENCOUNTER
PHARMACIST STATES THAT CREAM IS NOT COVERED UNDER INSURANCE - ASKING IF IT COULD BE CHANGED TO SOMETHING CHEAPER Jayant Matthews - BRADY

## 2021-07-19 NOTE — PROGRESS NOTES
Assessment/Plan:    S/P laparoscopic hysterectomy  Recovering well  Maintain f/u with OB as planned  Diagnoses and all orders for this visit:    Allergic contact dermatitis due to adhesives  Comments:  use topical steroid as rx'd & continue antihistamine as needed, call if no improvement in 2 weeks  Orders:  -     betamethasone, augmented, (DIPROLENE) 0 05 % ointment; Apply topically 2 (two) times a day    S/P laparoscopic hysterectomy          Subjective:      Patient ID: Mary Hogan is a 42 y o  female  Had hysterectomy last week and still has rash from where adhesives were placed  Itches at times  Incision sites have been oozing but no drainage from other sites  She notified her OB of incision drainage  Has been using OTC cortisone and benadryl cream without relief  Has been taking benadryl at night with benefit  The following portions of the patient's history were reviewed and updated as appropriate: allergies, current medications, past family history, past medical history, past social history, past surgical history and problem list     Review of Systems   Skin: Positive for rash  Objective:      /70 (BP Location: Left arm, Patient Position: Sitting, Cuff Size: Standard)   Pulse 100   Temp 98 8 °F (37 1 °C) (Tympanic)   Ht 5' 2 5" (1 588 m)   Wt 75 2 kg (165 lb 12 8 oz)   SpO2 98%   BMI 29 84 kg/m²          Physical Exam  Vitals reviewed  Constitutional:       General: She is not in acute distress  Appearance: Normal appearance  HENT:      Head: Normocephalic and atraumatic  Eyes:      General: No scleral icterus  Pulmonary:      Effort: Pulmonary effort is normal  No respiratory distress  Abdominal:      General: There is distension (soft)  Palpations: Abdomen is soft  Tenderness: There is abdominal tenderness (healing lap incisions)  Skin:     General: Skin is warm and dry  Findings: Rash present            Neurological:      General: No focal deficit present  Mental Status: She is alert and oriented to person, place, and time     Psychiatric:         Mood and Affect: Mood normal          Behavior: Behavior normal

## 2021-07-29 ENCOUNTER — OFFICE VISIT (OUTPATIENT)
Dept: OBGYN CLINIC | Facility: CLINIC | Age: 41
End: 2021-07-29

## 2021-07-29 VITALS — DIASTOLIC BLOOD PRESSURE: 70 MMHG | BODY MASS INDEX: 30.02 KG/M2 | WEIGHT: 166.8 LBS | SYSTOLIC BLOOD PRESSURE: 120 MMHG

## 2021-07-29 DIAGNOSIS — E89.40 SURGICAL MENOPAUSE: ICD-10-CM

## 2021-07-29 DIAGNOSIS — L23.1 ALLERGIC CONTACT DERMATITIS DUE TO ADHESIVES: ICD-10-CM

## 2021-07-29 DIAGNOSIS — Z09 POSTOPERATIVE EXAMINATION: Primary | ICD-10-CM

## 2021-07-29 PROCEDURE — 99024 POSTOP FOLLOW-UP VISIT: CPT | Performed by: OBSTETRICS & GYNECOLOGY

## 2021-07-29 NOTE — PROGRESS NOTES
Jai Palm is a 42 y o  female who presents to the clinic 2 weeks status post TLH, LSO for abnormal uterine bleeding and pelvic pain  Eating a regular diet without difficulty  Bowel movements are normal  The patient is not having any pain  Complains of hot flashes and night sweats  She states that they come and go, not affect quality of life  We discussed the benefits of estrogen replacement as well as the risks associated  The following portions of the patient's history were reviewed and updated as appropriate: allergies, current medications, past family history, past medical history, past social history, past surgical history and problem list     Review of Systems  Pertinent items are noted in HPI  Objective     /70   Wt 75 7 kg (166 lb 12 8 oz)   LMP 06/14/2021   BMI 30 02 kg/m²   General:  alert and oriented, in no acute distress   Abdomen: soft, bowel sounds active, non-tender, contact dermatitis noted at incisions   Vaginal cuff:   healing well, well approximated       Patient Active Problem List   Diagnosis    Abnormal uterine bleeding    Ovarian cyst, right    Chronic pain syndrome    Chronic pain of right knee    Primary osteoarthritis of right knee    Chronic right-sided low back pain without sciatica    Radiculopathy, lumbar region    Benign essential HTN    Hypothyroidism    Lumbar disc herniation    PONV (postoperative nausea and vomiting)    Left ovarian cyst    Endometrioma    S/P laparoscopic hysterectomy     Chief Complaint     Post-op          Assessment      Doing well postoperatively  Operative findings again reviewed  Pathology report discussed  Plan     1  Continue any current medications  2  Wound care discussed  3  Activity restrictions: no lifting more than 25 pounds  4  Anticipated return to work: not applicable    5  Follow up: 4 weeks

## 2021-08-06 ENCOUNTER — NURSE TRIAGE (OUTPATIENT)
Dept: OTHER | Facility: OTHER | Age: 41
End: 2021-08-06

## 2021-08-06 ENCOUNTER — DOCUMENTATION (OUTPATIENT)
Dept: LABOR AND DELIVERY | Facility: HOSPITAL | Age: 41
End: 2021-08-06

## 2021-08-06 NOTE — TELEPHONE ENCOUNTER
Reason for Disposition   [1] Caller has URGENT question AND [2] triager unable to answer question    Answer Assessment - Initial Assessment Questions  1  SYMPTOM: "What's the main symptom you're concerned about?" (e g , pain, fever, vomiting)      Gush of blood when sitting down    2  ONSET: "When did S/S  start?"    1900    3  SURGERY: "What surgery was performed?"      Hysterectomy    4  DATE of SURGERY: "When was surgery performed?"       7/12/21    5  ANESTHESIA: " What type of anesthesia did you have?" (e g , general, spinal, epidural, local)      General    6  PAIN: "Is there any pain?" If Yes, ask: "How bad is it?"  (Scale 1-10; or mild, moderate, severe)      Denies    7  FEVER: "Do you have a fever?" If Yes, ask: "What is your temperature, how was it measured, and when did it start?"      Denies    8  VOMITING: "Is there any vomiting?" If yes, ask: "How many times?"      Denies    9  BLEEDING: "Is there any bleeding?" If Yes, ask: "How much?" and "Where?"      Yes, vaginal bleeding    10   OTHER SYMPTOMS: "Do you have any other symptoms?" (e g , drainage from wound, painful urination, constipation)       denies    Protocols used: POST-OP SYMPTOMS AND QUESTIONS-[facility]

## 2021-08-06 NOTE — PROGRESS NOTES
Patient had a hysterectomy two weeks ago and complained of A gush  blood in the toilet and is still having a small amount of bleeding  She is not having any pain or lightheadedness or she was advised to put a pad on  If shes having heavy bleeding to go to ED  If bleeding continues but its light, she should be seen in office on Monday   Will message dr Willow Vizcaino

## 2021-08-06 NOTE — TELEPHONE ENCOUNTER
Regarding: Post hysterectomy / gush of blood / still bleeding  ----- Message from [facility] sent at 8/6/2021  7:07 PM EDT -----  " I recently had a hysterectomy huge gush of blood and think i am still bleeding, i went to go to the bathroom I went in and huge amount of blood in the bathroom and it is still coming out "

## 2021-08-09 DIAGNOSIS — E03.9 HYPOTHYROIDISM, UNSPECIFIED TYPE: ICD-10-CM

## 2021-08-09 RX ORDER — LEVOTHYROXINE SODIUM 88 UG/1
TABLET ORAL
Qty: 30 TABLET | Refills: 0 | Status: SHIPPED | OUTPATIENT
Start: 2021-08-09 | End: 2021-08-10 | Stop reason: SDUPTHER

## 2021-08-10 DIAGNOSIS — E03.9 HYPOTHYROIDISM, UNSPECIFIED TYPE: ICD-10-CM

## 2021-08-10 RX ORDER — LEVOTHYROXINE SODIUM 88 UG/1
TABLET ORAL
Qty: 90 TABLET | Refills: 0 | Status: SHIPPED | OUTPATIENT
Start: 2021-08-10 | End: 2021-08-16 | Stop reason: SDUPTHER

## 2021-08-12 ENCOUNTER — HOSPITAL ENCOUNTER (OUTPATIENT)
Dept: MAMMOGRAPHY | Facility: IMAGING CENTER | Age: 41
Discharge: HOME | End: 2021-08-12
Payer: COMMERCIAL

## 2021-08-12 VITALS — WEIGHT: 166 LBS | BODY MASS INDEX: 29.41 KG/M2 | HEIGHT: 63 IN

## 2021-08-12 DIAGNOSIS — Z12.31 VISIT FOR SCREENING MAMMOGRAM: ICD-10-CM

## 2021-08-12 PROCEDURE — 77067 SCR MAMMO BI INCL CAD: CPT

## 2021-08-12 PROCEDURE — 77063 BREAST TOMOSYNTHESIS BI: CPT

## 2021-08-16 ENCOUNTER — TELEPHONE (OUTPATIENT)
Dept: OBGYN CLINIC | Facility: MEDICAL CENTER | Age: 41
End: 2021-08-16

## 2021-08-16 NOTE — TELEPHONE ENCOUNTER
Pt called stating she has been on the estrogen (premarin) for 2 weeks now, as instructed but noticed symptoms for menopause are returning  Would like some guidance on what she should do next

## 2021-08-17 DIAGNOSIS — E89.40 SURGICAL MENOPAUSE: Primary | ICD-10-CM

## 2021-08-17 NOTE — TELEPHONE ENCOUNTER
Having vasomotor symptoms on current dose of premarin  Describing frequent hot flashes    Requesting dose increase

## 2021-08-31 DIAGNOSIS — E89.40 SURGICAL MENOPAUSE: Primary | ICD-10-CM

## 2021-08-31 NOTE — TELEPHONE ENCOUNTER
states that she did not increase dose to    45mg and is doing well on the 0 30mg dose    Needs refill

## 2021-09-08 DIAGNOSIS — E03.9 HYPOTHYROIDISM, UNSPECIFIED TYPE: ICD-10-CM

## 2021-09-08 DIAGNOSIS — I10 BENIGN ESSENTIAL HTN: ICD-10-CM

## 2021-09-08 RX ORDER — OLMESARTAN MEDOXOMIL AND HYDROCHLOROTHIAZIDE 20/12.5 20; 12.5 MG/1; MG/1
TABLET ORAL
Qty: 90 TABLET | Refills: 0 | Status: SHIPPED | OUTPATIENT
Start: 2021-09-08 | End: 2021-12-06

## 2021-09-08 RX ORDER — LEVOTHYROXINE SODIUM 88 UG/1
TABLET ORAL
Qty: 30 TABLET | Refills: 0 | Status: SHIPPED | OUTPATIENT
Start: 2021-09-08 | End: 2021-10-08

## 2021-09-13 NOTE — PROGRESS NOTES
Manan Worley is a 42 y o  female who presents to the clinic 8 weeks status post TLH, LSO for abnormal uterine bleeding and pelvic pain  Eating a regular diet without difficulty  Bowel movements are normal  The patient is not having any pain  Doing well on Premarin  Denies recent vaginal bleeding  States she may have been more active the day she noted her bleeding  The following portions of the patient's history were reviewed and updated as appropriate: allergies, current medications, past family history, past medical history, past social history, past surgical history and problem list     Review of Systems  Pertinent items are noted in HPI  Objective     /78   Ht 5' 2 5" (1 588 m)   Wt 74 3 kg (163 lb 12 8 oz)   LMP 07/09/2021   Breastfeeding No   BMI 29 48 kg/m²   General:  alert and oriented, in no acute distress   Abdomen: soft, bowel sounds active, non-tender, incisions healed   Vaginal cuff:   healing well, well approximated, no sutures noted, no bleeding       Patient Active Problem List   Diagnosis    Abnormal uterine bleeding    Ovarian cyst, right    Chronic pain syndrome    Chronic pain of right knee    Primary osteoarthritis of right knee    Chronic right-sided low back pain without sciatica    Radiculopathy, lumbar region    Benign essential HTN    Hypothyroidism    Lumbar disc herniation    PONV (postoperative nausea and vomiting)    Left ovarian cyst    Endometrioma    S/P laparoscopic hysterectomy     Chief Complaint     Post-op          Assessment      Doing well postoperatively  Plan     1  Continue any current medications  2  Wound care discussed  3  Activity restrictions: none  4  Anticipated return to work: now    5  Follow up routine annual

## 2021-09-14 ENCOUNTER — OFFICE VISIT (OUTPATIENT)
Dept: OBGYN CLINIC | Facility: CLINIC | Age: 41
End: 2021-09-14

## 2021-09-14 VITALS
SYSTOLIC BLOOD PRESSURE: 118 MMHG | BODY MASS INDEX: 29.02 KG/M2 | HEIGHT: 63 IN | DIASTOLIC BLOOD PRESSURE: 78 MMHG | WEIGHT: 163.8 LBS

## 2021-09-14 DIAGNOSIS — Z09 POSTOPERATIVE EXAMINATION: Primary | ICD-10-CM

## 2021-09-14 PROCEDURE — 99024 POSTOP FOLLOW-UP VISIT: CPT | Performed by: OBSTETRICS & GYNECOLOGY

## 2021-10-06 ENCOUNTER — TELEPHONE (OUTPATIENT)
Dept: FAMILY MEDICINE CLINIC | Facility: HOSPITAL | Age: 41
End: 2021-10-06

## 2021-10-06 DIAGNOSIS — Z11.52 ENCOUNTER FOR SCREENING FOR COVID-19: Primary | ICD-10-CM

## 2021-10-08 DIAGNOSIS — E03.9 HYPOTHYROIDISM, UNSPECIFIED TYPE: ICD-10-CM

## 2021-10-08 RX ORDER — LEVOTHYROXINE SODIUM 88 UG/1
TABLET ORAL
Qty: 30 TABLET | Refills: 0 | Status: SHIPPED | OUTPATIENT
Start: 2021-10-08 | End: 2021-11-10

## 2021-10-24 LAB
SARS-COV-2 IGG SERPL QL IA: POSITIVE
SARS-COV-2 IGM SERPL QL IA: POSITIVE

## 2021-11-10 DIAGNOSIS — E03.9 HYPOTHYROIDISM, UNSPECIFIED TYPE: ICD-10-CM

## 2021-11-10 RX ORDER — LEVOTHYROXINE SODIUM 88 UG/1
TABLET ORAL
Qty: 30 TABLET | Refills: 0 | Status: SHIPPED | OUTPATIENT
Start: 2021-11-10 | End: 2021-12-06

## 2021-11-29 ENCOUNTER — OFFICE VISIT (OUTPATIENT)
Dept: FAMILY MEDICINE CLINIC | Facility: HOSPITAL | Age: 41
End: 2021-11-29
Payer: COMMERCIAL

## 2021-11-29 VITALS
HEART RATE: 89 BPM | WEIGHT: 162.8 LBS | HEIGHT: 63 IN | TEMPERATURE: 97.2 F | OXYGEN SATURATION: 97 % | DIASTOLIC BLOOD PRESSURE: 80 MMHG | SYSTOLIC BLOOD PRESSURE: 118 MMHG | BODY MASS INDEX: 28.84 KG/M2

## 2021-11-29 DIAGNOSIS — J01.90 ACUTE NON-RECURRENT SINUSITIS, UNSPECIFIED LOCATION: Primary | ICD-10-CM

## 2021-11-29 DIAGNOSIS — I10 BENIGN ESSENTIAL HTN: ICD-10-CM

## 2021-11-29 DIAGNOSIS — F43.21 GRIEF: ICD-10-CM

## 2021-11-29 PROCEDURE — 3079F DIAST BP 80-89 MM HG: CPT | Performed by: FAMILY MEDICINE

## 2021-11-29 PROCEDURE — 1036F TOBACCO NON-USER: CPT | Performed by: FAMILY MEDICINE

## 2021-11-29 PROCEDURE — 3074F SYST BP LT 130 MM HG: CPT | Performed by: FAMILY MEDICINE

## 2021-11-29 PROCEDURE — 99214 OFFICE O/P EST MOD 30 MIN: CPT | Performed by: FAMILY MEDICINE

## 2021-11-29 PROCEDURE — 3008F BODY MASS INDEX DOCD: CPT | Performed by: FAMILY MEDICINE

## 2021-12-06 DIAGNOSIS — E03.9 HYPOTHYROIDISM, UNSPECIFIED TYPE: ICD-10-CM

## 2021-12-06 DIAGNOSIS — I10 BENIGN ESSENTIAL HTN: ICD-10-CM

## 2021-12-06 RX ORDER — LEVOTHYROXINE SODIUM 88 UG/1
TABLET ORAL
Qty: 30 TABLET | Refills: 5 | Status: SHIPPED | OUTPATIENT
Start: 2021-12-06 | End: 2022-06-04

## 2021-12-06 RX ORDER — OLMESARTAN MEDOXOMIL AND HYDROCHLOROTHIAZIDE 20/12.5 20; 12.5 MG/1; MG/1
TABLET ORAL
Qty: 90 TABLET | Refills: 1 | Status: SHIPPED | OUTPATIENT
Start: 2021-12-06 | End: 2022-06-03

## 2022-01-24 ENCOUNTER — TELEPHONE (OUTPATIENT)
Dept: FAMILY MEDICINE CLINIC | Facility: HOSPITAL | Age: 42
End: 2022-01-24

## 2022-01-24 DIAGNOSIS — H10.30 ACUTE CONJUNCTIVITIS, UNSPECIFIED ACUTE CONJUNCTIVITIS TYPE, UNSPECIFIED LATERALITY: Primary | ICD-10-CM

## 2022-01-24 RX ORDER — POLYMYXIN B SULFATE AND TRIMETHOPRIM 1; 10000 MG/ML; [USP'U]/ML
1 SOLUTION OPHTHALMIC EVERY 4 HOURS
Qty: 10 ML | Refills: 0 | Status: SHIPPED | OUTPATIENT
Start: 2022-01-24 | End: 2022-01-31

## 2022-01-24 NOTE — TELEPHONE ENCOUNTER
Eyes are itchy, red, this morning they were glued shut, works at a  where it is going around    Asking for eyes drops

## 2022-06-03 DIAGNOSIS — I10 BENIGN ESSENTIAL HTN: ICD-10-CM

## 2022-06-03 DIAGNOSIS — E89.40 SURGICAL MENOPAUSE: ICD-10-CM

## 2022-06-03 RX ORDER — OLMESARTAN MEDOXOMIL AND HYDROCHLOROTHIAZIDE 20/12.5 20; 12.5 MG/1; MG/1
TABLET ORAL
Qty: 90 TABLET | Refills: 0 | Status: SHIPPED | OUTPATIENT
Start: 2022-06-03

## 2022-06-03 RX ORDER — CONJUGATED ESTROGENS 0.3 MG/1
TABLET, FILM COATED ORAL
Qty: 90 TABLET | Refills: 0 | Status: SHIPPED | OUTPATIENT
Start: 2022-06-03

## 2022-06-04 DIAGNOSIS — E03.9 HYPOTHYROIDISM, UNSPECIFIED TYPE: ICD-10-CM

## 2022-06-04 RX ORDER — LEVOTHYROXINE SODIUM 88 UG/1
TABLET ORAL
Qty: 30 TABLET | Refills: 0 | Status: SHIPPED | OUTPATIENT
Start: 2022-06-04 | End: 2022-06-30

## 2022-06-30 DIAGNOSIS — E03.9 HYPOTHYROIDISM, UNSPECIFIED TYPE: ICD-10-CM

## 2022-06-30 RX ORDER — LEVOTHYROXINE SODIUM 88 UG/1
TABLET ORAL
Qty: 30 TABLET | Refills: 0 | Status: SHIPPED | OUTPATIENT
Start: 2022-06-30 | End: 2022-07-30

## 2022-08-12 ENCOUNTER — OFFICE VISIT (OUTPATIENT)
Dept: FAMILY MEDICINE CLINIC | Facility: HOSPITAL | Age: 42
End: 2022-08-12
Payer: COMMERCIAL

## 2022-08-12 VITALS
TEMPERATURE: 98 F | DIASTOLIC BLOOD PRESSURE: 82 MMHG | HEIGHT: 63 IN | WEIGHT: 158.2 LBS | HEART RATE: 89 BPM | BODY MASS INDEX: 28.03 KG/M2 | SYSTOLIC BLOOD PRESSURE: 110 MMHG

## 2022-08-12 DIAGNOSIS — Z11.4 SCREENING FOR HIV (HUMAN IMMUNODEFICIENCY VIRUS): ICD-10-CM

## 2022-08-12 DIAGNOSIS — Z11.59 NEED FOR HEPATITIS C SCREENING TEST: ICD-10-CM

## 2022-08-12 DIAGNOSIS — Z23 ENCOUNTER FOR IMMUNIZATION: ICD-10-CM

## 2022-08-12 DIAGNOSIS — Z00.00 ANNUAL PHYSICAL EXAM: Primary | ICD-10-CM

## 2022-08-12 DIAGNOSIS — Z13.6 SCREENING FOR CARDIOVASCULAR CONDITION: ICD-10-CM

## 2022-08-12 DIAGNOSIS — Z12.31 ENCOUNTER FOR SCREENING MAMMOGRAM FOR MALIGNANT NEOPLASM OF BREAST: ICD-10-CM

## 2022-08-12 DIAGNOSIS — E03.9 HYPOTHYROIDISM, UNSPECIFIED TYPE: ICD-10-CM

## 2022-08-12 DIAGNOSIS — Z13.1 SCREENING FOR DIABETES MELLITUS (DM): ICD-10-CM

## 2022-08-12 PROBLEM — Z98.890 PONV (POSTOPERATIVE NAUSEA AND VOMITING): Status: RESOLVED | Noted: 2020-11-10 | Resolved: 2022-08-12

## 2022-08-12 PROBLEM — R11.2 PONV (POSTOPERATIVE NAUSEA AND VOMITING): Status: RESOLVED | Noted: 2020-11-10 | Resolved: 2022-08-12

## 2022-08-12 PROCEDURE — 90471 IMMUNIZATION ADMIN: CPT

## 2022-08-12 PROCEDURE — 3725F SCREEN DEPRESSION PERFORMED: CPT | Performed by: FAMILY MEDICINE

## 2022-08-12 PROCEDURE — 90715 TDAP VACCINE 7 YRS/> IM: CPT

## 2022-08-12 PROCEDURE — 99396 PREV VISIT EST AGE 40-64: CPT | Performed by: FAMILY MEDICINE

## 2022-08-12 RX ORDER — FLUTICASONE PROPIONATE 50 MCG
1 SPRAY, SUSPENSION (ML) NASAL DAILY
COMMUNITY

## 2022-08-12 NOTE — PATIENT INSTRUCTIONS

## 2022-08-12 NOTE — PROGRESS NOTES
ADULT ANNUAL [address] [facility] [address]     NAME: Dennie Langton  AGE: 36 y o  SEX: female  : 1980     DATE: 2022     Assessment and Plan:     Problem List Items Addressed This Visit        Endocrine    Hypothyroidism    Relevant Orders    TSH, 3rd generation with Free T4 reflex (Completed)      Other Visit Diagnoses     Annual physical exam    -  Primary    Encounter for screening mammogram for malignant neoplasm of breast        Relevant Orders    Mammo screening bilateral w 3d & cad (Completed)    Screening for cardiovascular condition        Relevant Orders    CBC (Completed)    Comprehensive metabolic panel (Completed)    Lipid Panel with Direct LDL reflex (Completed)    Screening for diabetes mellitus (DM)        Relevant Orders    CBC (Completed)    Comprehensive metabolic panel (Completed)    Screening for HIV (human immunodeficiency virus)        Relevant Orders    Human Immunodeficiency Virus 1/2 Antigen / Antibody ( Fourth Generation) with Reflex Testing (Completed)    Need for hepatitis C screening test        Relevant Orders    HCV Antibody reflex to SIRI    Encounter for immunization        Relevant Orders    TDAP VACCINE GREATER THAN OR EQUAL TO 8YO IM (Completed)      Overall have her has been doing well  Continue the same medications  Update blood work  Immunizations and preventive care screenings were discussed with patient today  Appropriate education was printed on patient's after visit summary  Counseling:  Alcohol/drug use: discussed moderation in alcohol intake, the recommendations for healthy alcohol use, and avoidance of illicit drug use  Dental Health: discussed importance of regular tooth brushing, flossing, and dental visits  Sexual health: discussed sexually transmitted diseases, partner selection, use of condoms, avoidance of unintended pregnancy, and contraceptive alternatives    Exercise: the importance of regular exercise/physical activity was discussed  Recommend exercise 3-5 times per week for at least 30 minutes  No follow-ups on file  Chief Complaint:     Chief Complaint   Patient presents with    Annual Exam      History of Present Illness:     Adult Annual Physical   Patient here for a comprehensive physical exam  The patient reports  Doing well with htn and hypothyroidism       Diet and Physical Activity  Diet/Nutrition: well balanced diet  Exercise: moderate cardiovascular exercise  Depression Screening  PHQ-2/9 Depression Screening    Little interest or pleasure in doing things: 0 - not at all  Feeling down, depressed, or hopeless: 0 - not at all  PHQ-2 Score: 0  PHQ-2 Interpretation: Negative depression screen       General Health  Sleep: sleeps well  Hearing: normal - bilateral   Vision: no vision problems  Dental: regular dental visits  /GYN Health  Patient is: surgical menopause     Review of Systems:     Review of Systems   Constitutional: Negative  Negative for activity change, appetite change, chills, diaphoresis, fatigue and fever  HENT: Negative for congestion, facial swelling and sore throat  Respiratory: Negative  Negative for apnea, cough, chest tightness and shortness of breath  Cardiovascular: Negative  Negative for chest pain and palpitations  Gastrointestinal: Negative  Negative for abdominal distention, abdominal pain, blood in stool, constipation, diarrhea and nausea  Genitourinary: Negative  Negative for difficulty urinating, dysuria, flank pain and frequency        Past Medical History:     Past Medical History:   Diagnosis Date    Allergic 2013    COVID-19     Disease of thyroid gland 2002    Hypertension     PONV (postoperative nausea and vomiting)     PONV (postoperative nausea and vomiting) 11/10/2020      Past Surgical History:     Past Surgical History:   Procedure Laterality Date     SECTION  , 2013    CHOLECYSTECTOMY  2014    HYSTERECTOMY N/A 7/12/2021    Procedure: [address], cystoscopy;  Surgeon: Ector Sharp MD;  [facility];  Service: Gynecology    LAPAROSCOPY N/A 7/12/2021    Procedure: Dx  Gwendel Altes ;  Surgeon: Ector Sharp MD;  [facility];  Service: Gynecology    LIVER BIOPSY      OOPHORECTOMY Left 07/12/2021    OOPHORECTOMY Right 2019    PELVIC LAPAROSCOPY Left 7/12/2021    Procedure: SALPINGECTOMY, LAP; oophrectomy;  Surgeon: Ector Sharp MD;  [facility];  Service: Gynecology    MI LAMNOTMY INCL W/DCMPRSN NRV ROOT 1 INTRSPC LUMBR Right 11/10/2020    Procedure: LAMINECTOMY LUMBAR MINIMALLY INVASIVE, L5-S1, RIGHT;  Surgeon: Malathi Beasley MD;  Location[facility];  Service: Neurosurgery      Social History:     Social History     Socioeconomic History    Marital status: /Civil Union     Spouse name: None    Number of children: None    Years of education: None    Highest education level: None   Occupational History    None   Tobacco Use    Smoking status: Never Smoker    Smokeless tobacco: Never Used   Vaping Use    Vaping Use: Never used   Substance and Sexual Activity    Alcohol use: Not Currently     Alcohol/week: 1 0 standard drink     Types: 1 Standard drinks or equivalent per week     Comment: Very Occasionally - not even once a month    Drug use: Never    Sexual activity: Yes     Partners: Male     Birth control/protection: Male Sterilization   Other Topics Concern    None   Social History Narrative    None     Social Determinants of Health     Financial Resource Strain: Not on file   Food Insecurity: Not on file   Transportation Needs: Not on file   Physical Activity: Not on file   Stress: Not on file   Social Connections: Not on file   Intimate Partner Violence: Not on file   Housing Stability: Not on file      Family History:     Family History   Problem Relation Age of Onset    Arthritis Mother     Migraines Mother     Arthritis Maternal Grandmother     Parkinsonism Maternal Grandmother     Psoriasis Daughter     No Known Problems Father     No Known Problems Maternal Grandfather     No Known Problems Paternal Grandmother     No Known Problems Paternal Grandfather     No Known Problems Maternal Aunt       Current Medications:     Current Outpatient Medications   Medication Sig Dispense Refill    fluticasone (FLONASE) 50 mcg/act nasal spray 1 spray into each nostril daily      levothyroxine 88 mcg tablet TAKE 1 TABLET BY MOUTH ONCE DAILY IN THE MORNING ON AN EMPTY STOMACH 30 tablet 0    loratadine (CLARITIN) 10 mg tablet Take 10 mg by mouth daily      Multiple Vitamins-Minerals (multivitamin with minerals) tablet Take 1 tablet by mouth daily      olmesartan-hydrochlorothiazide (BENICAR HCT) 20-12 5 MG per tablet Take 1 tablet by mouth once daily 90 tablet 0    conjugated estrogens (Premarin) 0 45 mg tablet Take 1 tablet (0 45 mg total) by mouth daily Take daily 90 tablet 3     No current facility-administered medications for this visit  Allergies: Allergies   Allergen Reactions    Other Dermatitis     Exophin: severe dermatitis noted     Penicillins Rash      Physical Exam:     /82   Pulse 89   Temp 98 °F (36 7 °C)   Ht 5' 2 5" (1 588 m)   Wt 71 8 kg (158 lb 3 2 oz)   LMP 07/09/2021   BMI 28 47 kg/m²     Physical Exam  Vitals and nursing note reviewed  Constitutional:       General: She is not in acute distress  Appearance: Normal appearance  She is well-developed and normal weight  HENT:      Head: Normocephalic and atraumatic  Right Ear: Tympanic membrane, ear canal and external ear normal       Left Ear: Tympanic membrane, ear canal and external ear normal       Nose: Nose normal       Mouth/Throat:      Mouth: Mucous membranes are moist    Eyes:      Conjunctiva/sclera: Conjunctivae normal       Pupils: Pupils are equal, round, and reactive to light     Cardiovascular:      Rate and Rhythm: Normal rate and regular rhythm  Heart sounds: Normal heart sounds  No murmur heard  Pulmonary:      Effort: Pulmonary effort is normal  No respiratory distress  Breath sounds: Normal breath sounds  Abdominal:      General: Bowel sounds are normal       Palpations: Abdomen is soft  Tenderness: There is no abdominal tenderness  Musculoskeletal:      Cervical back: Normal range of motion and neck supple  Skin:     General: Skin is warm and dry  Neurological:      General: No focal deficit present  Mental Status: She is alert and oriented to person, place, and time     Psychiatric:         Mood and Affect: Mood normal          Behavior: Behavior normal           Cameron Frazier MD  [place] [id number]

## 2022-08-15 ENCOUNTER — HOSPITAL ENCOUNTER (OUTPATIENT)
Dept: MAMMOGRAPHY | Facility: IMAGING CENTER | Age: 42
Discharge: HOME | End: 2022-08-15
Payer: COMMERCIAL

## 2022-08-15 ENCOUNTER — ANNUAL EXAM (OUTPATIENT)
Dept: OBGYN CLINIC | Facility: MEDICAL CENTER | Age: 42
End: 2022-08-15
Payer: COMMERCIAL

## 2022-08-15 ENCOUNTER — TELEPHONE (OUTPATIENT)
Dept: FAMILY MEDICINE CLINIC | Facility: HOSPITAL | Age: 42
End: 2022-08-15

## 2022-08-15 VITALS
BODY MASS INDEX: 27.82 KG/M2 | DIASTOLIC BLOOD PRESSURE: 70 MMHG | SYSTOLIC BLOOD PRESSURE: 100 MMHG | HEIGHT: 63 IN | WEIGHT: 157 LBS

## 2022-08-15 VITALS — WEIGHT: 156 LBS | HEIGHT: 63 IN | BODY MASS INDEX: 27.64 KG/M2

## 2022-08-15 DIAGNOSIS — Z12.31 SCREENING MAMMOGRAM FOR BREAST CANCER: ICD-10-CM

## 2022-08-15 DIAGNOSIS — Z12.31 ENCOUNTER FOR SCREENING MAMMOGRAM FOR MALIGNANT NEOPLASM OF BREAST: ICD-10-CM

## 2022-08-15 DIAGNOSIS — Z01.419 ENCOUNTER FOR WELL WOMAN EXAM WITH ROUTINE GYNECOLOGICAL EXAM: Primary | ICD-10-CM

## 2022-08-15 DIAGNOSIS — E89.40 SURGICAL MENOPAUSE: ICD-10-CM

## 2022-08-15 PROCEDURE — 77063 BREAST TOMOSYNTHESIS BI: CPT

## 2022-08-15 PROCEDURE — 99396 PREV VISIT EST AGE 40-64: CPT | Performed by: OBSTETRICS & GYNECOLOGY

## 2022-08-15 PROCEDURE — 77067 SCR MAMMO BI INCL CAD: CPT

## 2022-08-15 PROCEDURE — 0503F POSTPARTUM CARE VISIT: CPT | Performed by: OBSTETRICS & GYNECOLOGY

## 2022-08-15 NOTE — PROGRESS NOTES
[facility] [address] [place] [address], [address]    ASSESSMENT/PLAN: Arabella Aden is a 36 y o   who presents for annual gynecologic exam     Encounter for routine gynecologic examination  - Routine well woman exam completed today  - Cervical Cancer Screening: Current ASCCP Guidelines reviewed  Last Pap: 2019   Next Pap Due: discontinue  - Breast Cancer Screening: Last Mammogram 08/15/2022, mammo ordered    Additional problems addressed at this visit:  1  Encounter for well woman exam with routine gynecological exam    2  Surgical menopause  -     conjugated estrogens (Premarin) 0 45 mg tablet; Take 1 tablet (0 45 mg total) by mouth daily Take daily    3  Screening mammogram for breast cancer  -     Mammo screening bilateral w 3d & cad; Future        CC:  Annual Gynecologic Examination    HPI: Arabella Aden is a 36 y o  Zunilda Shah who presents for annual gynecologic examination  HPI  Doing well, but has been having hot flashes and night sweats  Will increase dose of premarin  The following portions of the patient's history were reviewed and updated as appropriate: allergies, current medications, past family history, past medical history, obstetric history, gynecologic history, past social history, past surgical history and problem list     Review of Systems   Constitutional: Negative  HENT: Negative  Eyes: Negative  Respiratory: Negative  Cardiovascular: Negative  Gastrointestinal: Negative  Genitourinary: Negative  Musculoskeletal: Negative  All other systems reviewed and are negative  Objective:  /70 (BP Location: Right arm, Patient Position: Sitting, Cuff Size: Adult)   Ht 5' 2 5" (1 588 m)   Wt 71 2 kg (157 lb)   LMP 2021   BMI 28 26 kg/m²    Physical Exam  Vitals reviewed  Constitutional:       General: She is not in acute distress  Appearance: She is well-developed  HENT:      Head: Normocephalic and atraumatic  Nose: Nose normal    Cardiovascular:      Rate and Rhythm: Normal rate  Pulmonary:      Effort: Pulmonary effort is normal  No respiratory distress  Chest:   Breasts: Breasts are symmetrical       Right: Normal  No mass, nipple discharge, skin change, tenderness, axillary adenopathy or supraclavicular adenopathy  Left: Normal  No mass, nipple discharge, skin change, tenderness, axillary adenopathy or supraclavicular adenopathy  Abdominal:      General: There is no distension  Palpations: Abdomen is soft  There is no mass  Tenderness: There is no abdominal tenderness  There is no guarding or rebound  Genitourinary:     General: Normal vulva  Exam position: Lithotomy position  Labia:         Right: No lesion  Left: No lesion  Urethra: No prolapse  Vagina: Normal  No vaginal discharge, erythema or bleeding  Adnexa:         Right: No mass  Left: No mass  Musculoskeletal:         General: Normal range of motion  Cervical back: Normal range of motion  Lymphadenopathy:      Upper Body:      Right upper body: No supraclavicular, axillary or pectoral adenopathy  Left upper body: No supraclavicular, axillary or pectoral adenopathy  Lower Body: No right inguinal adenopathy  No left inguinal adenopathy  Skin:     General: Skin is warm and dry  Neurological:      Mental Status: She is alert and oriented to person, place, and time  Psychiatric:         Behavior: Behavior normal          Thought Content:  Thought content normal          Judgment: Judgment normal              Ambreen Hauser MD  [facility]  08/15/22 1:52 PM

## 2022-08-15 NOTE — TELEPHONE ENCOUNTER
Pt has questions about the covid booster  Saw the pt messages, but has some confusion  States she was asking about a second booster  Has only had the J&J accine + one booster  Wants to know if she should get the second   PCB

## 2022-08-17 DIAGNOSIS — E03.9 HYPOTHYROIDISM, UNSPECIFIED TYPE: Primary | ICD-10-CM

## 2022-08-17 LAB
ALBUMIN SERPL-MCNC: 4.6 G/DL (ref 3.8–4.8)
ALBUMIN/GLOB SERPL: 1.9 {RATIO} (ref 1.2–2.2)
ALP SERPL-CCNC: 89 IU/L (ref 44–121)
ALT SERPL-CCNC: 19 IU/L (ref 0–32)
AST SERPL-CCNC: 20 IU/L (ref 0–40)
BILIRUB SERPL-MCNC: 0.4 MG/DL (ref 0–1.2)
BUN SERPL-MCNC: 10 MG/DL (ref 6–24)
BUN/CREAT SERPL: 13 (ref 9–23)
CALCIUM SERPL-MCNC: 10.1 MG/DL (ref 8.7–10.2)
CHLORIDE SERPL-SCNC: 101 MMOL/L (ref 96–106)
CHOLEST SERPL-MCNC: 205 MG/DL (ref 100–199)
CO2 SERPL-SCNC: 23 MMOL/L (ref 20–29)
CREAT SERPL-MCNC: 0.78 MG/DL (ref 0.57–1)
EGFR: 97 ML/MIN/1.73
ERYTHROCYTE [DISTWIDTH] IN BLOOD BY AUTOMATED COUNT: 12.5 % (ref 11.7–15.4)
GLOBULIN SER-MCNC: 2.4 G/DL (ref 1.5–4.5)
GLUCOSE SERPL-MCNC: 88 MG/DL (ref 65–99)
HCT VFR BLD AUTO: 42.1 % (ref 34–46.6)
HDLC SERPL-MCNC: 53 MG/DL
HGB BLD-MCNC: 14.3 G/DL (ref 11.1–15.9)
HIV 1+2 AB+HIV1 P24 AG SERPL QL IA: NON REACTIVE
LDLC SERPL CALC-MCNC: 135 MG/DL (ref 0–99)
LDLC/HDLC SERPL: 2.5 RATIO (ref 0–3.2)
MCH RBC QN AUTO: 30.4 PG (ref 26.6–33)
MCHC RBC AUTO-ENTMCNC: 34 G/DL (ref 31.5–35.7)
MCV RBC AUTO: 90 FL (ref 79–97)
PLATELET # BLD AUTO: 236 X10E3/UL (ref 150–450)
POTASSIUM SERPL-SCNC: 4.4 MMOL/L (ref 3.5–5.2)
PROT SERPL-MCNC: 7 G/DL (ref 6–8.5)
RBC # BLD AUTO: 4.7 X10E6/UL (ref 3.77–5.28)
SL AMB VLDL CHOLESTEROL CALC: 17 MG/DL (ref 5–40)
SODIUM SERPL-SCNC: 137 MMOL/L (ref 134–144)
TRIGL SERPL-MCNC: 93 MG/DL (ref 0–149)
TSH SERPL DL<=0.005 MIU/L-ACNC: 0.15 UIU/ML (ref 0.45–4.5)
WBC # BLD AUTO: 9.1 X10E3/UL (ref 3.4–10.8)

## 2022-08-17 NOTE — TELEPHONE ENCOUNTER
Please call  AT this point I would hold off on an additional booster due to relatively low numbers and severity  However based off CDC guidelines alone it is recommended

## 2022-08-29 DIAGNOSIS — I10 BENIGN ESSENTIAL HTN: ICD-10-CM

## 2022-08-29 DIAGNOSIS — E03.9 HYPOTHYROIDISM, UNSPECIFIED TYPE: ICD-10-CM

## 2022-08-29 RX ORDER — LEVOTHYROXINE SODIUM 88 UG/1
TABLET ORAL
Qty: 30 TABLET | Refills: 0 | Status: SHIPPED | OUTPATIENT
Start: 2022-08-29 | End: 2022-09-07

## 2022-08-29 RX ORDER — OLMESARTAN MEDOXOMIL AND HYDROCHLOROTHIAZIDE 20/12.5 20; 12.5 MG/1; MG/1
TABLET ORAL
Qty: 90 TABLET | Refills: 0 | Status: SHIPPED | OUTPATIENT
Start: 2022-08-29

## 2022-09-02 LAB
T3 SERPL-MCNC: 136 NG/DL (ref 71–180)
T4 FREE SERPL-MCNC: 1.51 NG/DL (ref 0.82–1.77)
TSH SERPL DL<=0.005 MIU/L-ACNC: 0.15 UIU/ML (ref 0.45–4.5)

## 2022-09-07 ENCOUNTER — TELEPHONE (OUTPATIENT)
Dept: FAMILY MEDICINE CLINIC | Facility: HOSPITAL | Age: 42
End: 2022-09-07

## 2022-09-07 DIAGNOSIS — E03.9 HYPOTHYROIDISM, UNSPECIFIED TYPE: ICD-10-CM

## 2022-09-07 RX ORDER — LEVOTHYROXINE SODIUM 0.07 MG/1
75 TABLET ORAL
Qty: 90 TABLET | Refills: 0 | Status: SHIPPED | OUTPATIENT
Start: 2022-09-07

## 2022-09-07 NOTE — TELEPHONE ENCOUNTER
JOI IS RETURNING CARLOS'S CALL - PLEASE LEAVE A DETAILED MESSAGE ON HER VOICEMAIL [phone] - SHE IS AT WORK AND UNABLE TO ANSWER

## 2022-11-01 LAB — TSH SERPL DL<=0.005 MIU/L-ACNC: 0.25 UIU/ML (ref 0.45–4.5)

## 2022-11-02 DIAGNOSIS — E03.9 HYPOTHYROIDISM, UNSPECIFIED TYPE: ICD-10-CM

## 2022-11-02 RX ORDER — LEVOTHYROXINE SODIUM 0.03 MG/1
25 TABLET ORAL
Qty: 60 TABLET | Refills: 0 | Status: SHIPPED | OUTPATIENT
Start: 2022-11-02

## 2022-11-27 DIAGNOSIS — I10 BENIGN ESSENTIAL HTN: ICD-10-CM

## 2022-11-27 RX ORDER — OLMESARTAN MEDOXOMIL AND HYDROCHLOROTHIAZIDE 20/12.5 20; 12.5 MG/1; MG/1
TABLET ORAL
Qty: 90 TABLET | Refills: 0 | Status: SHIPPED | OUTPATIENT
Start: 2022-11-27

## 2022-12-28 DIAGNOSIS — E03.9 HYPOTHYROIDISM, UNSPECIFIED TYPE: ICD-10-CM

## 2022-12-28 RX ORDER — LEVOTHYROXINE SODIUM 0.03 MG/1
TABLET ORAL
Qty: 60 TABLET | Refills: 0 | Status: SHIPPED | OUTPATIENT
Start: 2022-12-28

## 2023-01-17 LAB — TSH SERPL DL<=0.005 MIU/L-ACNC: 4.29 UIU/ML (ref 0.45–4.5)

## 2023-02-26 DIAGNOSIS — E03.9 HYPOTHYROIDISM, UNSPECIFIED TYPE: ICD-10-CM

## 2023-02-26 RX ORDER — LEVOTHYROXINE SODIUM 0.03 MG/1
TABLET ORAL
Qty: 60 TABLET | Refills: 0 | Status: SHIPPED | OUTPATIENT
Start: 2023-02-26

## 2023-02-28 ENCOUNTER — OFFICE VISIT (OUTPATIENT)
Dept: FAMILY MEDICINE CLINIC | Facility: HOSPITAL | Age: 43
End: 2023-02-28

## 2023-02-28 VITALS
DIASTOLIC BLOOD PRESSURE: 70 MMHG | TEMPERATURE: 98.4 F | HEIGHT: 63 IN | HEART RATE: 80 BPM | BODY MASS INDEX: 28.92 KG/M2 | WEIGHT: 163.2 LBS | SYSTOLIC BLOOD PRESSURE: 102 MMHG

## 2023-02-28 DIAGNOSIS — J02.9 PHARYNGITIS, UNSPECIFIED ETIOLOGY: Primary | ICD-10-CM

## 2023-02-28 LAB — S PYO AG THROAT QL: NEGATIVE

## 2023-02-28 RX ORDER — CEFUROXIME AXETIL 500 MG/1
500 TABLET ORAL EVERY 12 HOURS SCHEDULED
Qty: 14 TABLET | Refills: 0 | Status: SHIPPED | OUTPATIENT
Start: 2023-02-28 | End: 2023-03-07

## 2023-02-28 NOTE — PROGRESS NOTES
Name: Dusty Seen      : 1980      MRN: [id number]  Encounter Provider: JESS Davila  Encounter Date: 2023   Encounter department: [facility] [address]  Pharyngitis, unspecified etiology  -     POCT rapid strepA  -     Throat culture; Future  -     cefuroxime (CEFTIN) 500 mg tablet; Take 1 tablet (500 mg total) by mouth every 12 (twelve) hours for 7 days    rapid strep negative, will send throat cx to eval further  Will start antibiotic given strep exposure and abnormal exam  Continue OTC meds prn, rest, push fluids, and gargle as needed         Subjective      Started with a sore throat last night and worse this morning  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  Took motrin with some relief  Review of Systems   Constitutional: Negative for chills and fever  HENT: Positive for ear pain (left ear), postnasal drip ("very phlegmy" she attributes to allergies x2 days) and sore throat  Hematological: Positive for adenopathy (swollen glands)  Current Outpatient Medications on File Prior to Visit   Medication Sig   • conjugated estrogens (Premarin) 0 45 mg tablet Take 1 tablet (0 45 mg total) by mouth daily Take daily   • fluticasone (FLONASE) 50 mcg/act nasal spray 1 spray into each nostril daily   • levothyroxine 25 mcg tablet TAKE 1 TABLET BY MOUTH ONCE DAILY IN THE EARLY MORNING   • loratadine (CLARITIN) 10 mg tablet Take 10 mg by mouth daily   • Multiple Vitamins-Minerals (multivitamin with minerals) tablet Take 1 tablet by mouth daily   • olmesartan-hydrochlorothiazide (BENICAR HCT) 20-12 5 MG per tablet Take 1 tablet by mouth once daily       Objective     /70   Pulse 80   Temp 98 4 °F (36 9 °C)   Ht 5' 2 5" (1 588 m)   Wt 74 kg (163 lb 3 2 oz)   LMP 2021   BMI 29 37 kg/m²       Physical Exam  Vitals reviewed     Constitutional:       General: She is not in acute distress  HENT:      Head: Normocephalic and atraumatic  Right Ear: Ear canal and external ear normal  A middle ear effusion is present  Left Ear: Ear canal and external ear normal  A middle ear effusion is present  Nose: Congestion present  Mouth/Throat:      Mouth: Mucous membranes are moist       Pharynx: Oropharyngeal exudate (right posterior) and posterior oropharyngeal erythema present  Tonsils: 1+ on the right  1+ on the left  Eyes:      Conjunctiva/sclera: Conjunctivae normal    Cardiovascular:      Rate and Rhythm: Normal rate and regular rhythm  Heart sounds: Normal heart sounds  No murmur heard  Pulmonary:      Effort: Pulmonary effort is normal  No respiratory distress  Breath sounds: Normal breath sounds  No wheezing  Lymphadenopathy:      Head:      Right side of head: Submandibular adenopathy present  Left side of head: Submandibular adenopathy present  Cervical: No cervical adenopathy  Skin:     General: Skin is warm and dry  Neurological:      General: No focal deficit present  Mental Status: She is alert and oriented to person, place, and time     Psychiatric:         Mood and Affect: Mood normal          Behavior: Behavior normal           JESS Cueva

## 2023-03-03 LAB — B-HEM STREP SPEC QL CULT: NEGATIVE

## 2023-03-29 ENCOUNTER — OFFICE VISIT (OUTPATIENT)
Dept: FAMILY MEDICINE CLINIC | Facility: HOSPITAL | Age: 43
End: 2023-03-29

## 2023-03-29 VITALS
DIASTOLIC BLOOD PRESSURE: 82 MMHG | HEART RATE: 77 BPM | SYSTOLIC BLOOD PRESSURE: 118 MMHG | TEMPERATURE: 97.6 F | BODY MASS INDEX: 28.17 KG/M2 | WEIGHT: 159 LBS | HEIGHT: 63 IN

## 2023-03-29 DIAGNOSIS — E03.9 HYPOTHYROIDISM, UNSPECIFIED TYPE: ICD-10-CM

## 2023-03-29 DIAGNOSIS — I10 BENIGN ESSENTIAL HTN: Primary | ICD-10-CM

## 2023-03-29 DIAGNOSIS — F41.9 ANXIETY: ICD-10-CM

## 2023-03-29 NOTE — PROGRESS NOTES
Name: Bridget Henderson      : 1980      MRN: [id number]  Encounter Provider: Malissa Ortiz MD  Encounter Date: 3/29/2023   Encounter department: [facility] [address]     1  Benign essential HTN    2  Anxiety    3  Hypothyroidism, unspecified type     [facility] has been doing well  Blood pressure is well controlled  Monitor blood pressures and to let me know if blood pressures getting too low  Would discontinue the hydrochlorothiazide  Hypothyroidism has been stable  Blood work is up-to-date  Anxiety  Has been well controlled  Has had a rare occasion need for lorazepam for very mild panic or increased anxiety  No red flags  Subjective      Patient is here for follow-up of chronic condition  Patient with known hypertension and hypothyroidism  Doing well with medications  Is very seldom lightheadedness or dizziness  Not clear if this is due to hormones and use of estrogen  Also noting she does have some anxiety with occasional panic attacks  She uses lorazepam on a rare occasion  Prescription of 30 tablets was given in  and she still has several pills left  Review of Systems   Constitutional: Negative  Negative for activity change, appetite change, chills, diaphoresis, fatigue and fever  HENT: Negative for congestion, facial swelling and sore throat  Respiratory: Negative  Negative for apnea, cough, chest tightness and shortness of breath  Cardiovascular: Negative  Negative for chest pain and palpitations  Gastrointestinal: Negative  Negative for abdominal distention, abdominal pain, blood in stool, constipation, diarrhea and nausea  Genitourinary: Negative  Negative for difficulty urinating, dysuria, flank pain and frequency         Current Outpatient Medications on File Prior to Visit   Medication Sig   • conjugated estrogens (Premarin) 0 45 mg tablet Take 1 tablet (0 45 mg total) by mouth daily Take daily   • "fluticasone (FLONASE) 50 mcg/act nasal spray 1 spray into each nostril daily   • levothyroxine 25 mcg tablet TAKE 1 TABLET BY MOUTH ONCE DAILY IN THE EARLY MORNING   • loratadine (CLARITIN) 10 mg tablet Take 10 mg by mouth daily   • Multiple Vitamins-Minerals (multivitamin with minerals) tablet Take 1 tablet by mouth daily   • olmesartan-hydrochlorothiazide (BENICAR HCT) 20-12 5 MG per tablet Take 1 tablet by mouth once daily       Objective     /82   Pulse 77   Temp 97 6 °F (36 4 °C)   Ht 5' 2 5\" (1 588 m)   Wt 72 1 kg (159 lb)   LMP 07/09/2021   BMI 28 62 kg/m²     Physical Exam  Vitals and nursing note reviewed  Constitutional:       Appearance: Normal appearance  She is well-developed  HENT:      Head: Normocephalic and atraumatic  Right Ear: External ear normal       Left Ear: External ear normal       Nose: Nose normal    Eyes:      Conjunctiva/sclera: Conjunctivae normal       Pupils: Pupils are equal, round, and reactive to light  Neck:      Thyroid: No thyromegaly  Trachea: No tracheal deviation  Cardiovascular:      Rate and Rhythm: Normal rate and regular rhythm  Heart sounds: Normal heart sounds  No murmur heard  Pulmonary:      Effort: Pulmonary effort is normal  No respiratory distress  Breath sounds: Normal breath sounds  No wheezing  Abdominal:      General: Bowel sounds are normal       Palpations: Abdomen is soft  Musculoskeletal:         General: Normal range of motion  Cervical back: Normal range of motion and neck supple  Skin:     General: Skin is warm and dry  Capillary Refill: Capillary refill takes less than 2 seconds  Neurological:      General: No focal deficit present  Mental Status: She is alert and oriented to person, place, and time     Psychiatric:         Mood and Affect: Mood normal          Behavior: Behavior normal        Reina Wagoner MD  "

## 2023-04-08 ENCOUNTER — OFFICE VISIT (OUTPATIENT)
Dept: URGENT CARE | Facility: CLINIC | Age: 43
End: 2023-04-08

## 2023-04-08 VITALS
TEMPERATURE: 96.2 F | OXYGEN SATURATION: 99 % | HEART RATE: 76 BPM | RESPIRATION RATE: 18 BRPM | SYSTOLIC BLOOD PRESSURE: 118 MMHG | DIASTOLIC BLOOD PRESSURE: 82 MMHG

## 2023-04-08 DIAGNOSIS — H10.32 ACUTE BACTERIAL CONJUNCTIVITIS OF LEFT EYE: Primary | ICD-10-CM

## 2023-04-08 RX ORDER — POLYMYXIN B SULFATE AND TRIMETHOPRIM 1; 10000 MG/ML; [USP'U]/ML
1 SOLUTION OPHTHALMIC
Qty: 10 ML | Refills: 0 | Status: SHIPPED | OUTPATIENT
Start: 2023-04-08

## 2023-04-08 NOTE — PROGRESS NOTES
[facility]        NAME: Almita Rodriguez is a 39 y o  female  : 1980    MRN: [id number]  DATE: 2023  TIME: 8:35 AM    Assessment and Plan   Acute bacterial conjunctivitis of left eye [H10 32]  1  Acute bacterial conjunctivitis of left eye  polymyxin b-trimethoprim (POLYTRIM) ophthalmic solution            Patient Instructions       Follow up with PCP in 3-5 days  Proceed to ER if symptoms worsen  Chief Complaint     Chief Complaint   Patient presents with   • Possible [facility]     Pt reports redness in right eye x1 day  History of Present Illness        with redness and irritation in left eye starting yesterday  Also starting to have some discomfort and crusty drainage in right eye starting this morning  Has twins at  who appears to have pinkeye as they were telling the staff that they were getting eyedrops- not confirmed with parents  Review of Systems   Review of Systems   Constitutional: Negative  HENT: Negative  Eyes: Positive for discharge and redness  Respiratory: Negative  Cardiovascular: Negative  Gastrointestinal: Negative  Neurological: Negative            Current Medications       Current Outpatient Medications:   •  conjugated estrogens (Premarin) 0 45 mg tablet, Take 1 tablet (0 45 mg total) by mouth daily Take daily, Disp: 90 tablet, Rfl: 3  •  fluticasone (FLONASE) 50 mcg/act nasal spray, 1 spray into each nostril daily, Disp: , Rfl:   •  levothyroxine 25 mcg tablet, TAKE 1 TABLET BY MOUTH ONCE DAILY IN THE EARLY MORNING, Disp: 60 tablet, Rfl: 0  •  loratadine (CLARITIN) 10 mg tablet, Take 10 mg by mouth daily, Disp: , Rfl:   •  Multiple Vitamins-Minerals (multivitamin with minerals) tablet, Take 1 tablet by mouth daily, Disp: , Rfl:   •  olmesartan-hydrochlorothiazide (BENICAR HCT) 20-12 5 MG per tablet, Take 1 tablet by mouth once daily, Disp: 90 tablet, Rfl: 0  •  polymyxin b-trimethoprim (POLYTRIM) ophthalmic solution, Administer 1 drop to both eyes every 4 (four) hours while awake, Disp: 10 mL, Rfl: 0    Current Allergies     Allergies as of 2023 - Reviewed 2023   Allergen Reaction Noted   • Other Dermatitis 2021   • Penicillins Rash 2019            The following portions of the patient's history were reviewed and updated as appropriate: allergies, current medications, past family history, past medical history, past social history, past surgical history and problem list      Past Medical History:   Diagnosis Date   • Allergic    • COVID-19    • Disease of thyroid gland    • Hypertension    • PONV (postoperative nausea and vomiting)    • PONV (postoperative nausea and vomiting) 11/10/2020       Past Surgical History:   Procedure Laterality Date   •  SECTION  ,    • CHOLECYSTECTOMY     • HYSTERECTOMY N/A 2021    Procedure: Deanna Pope, cystoscopy;  Surgeon: Wanda Zheng MD;  [facility];  Service: Gynecology   • LAPAROSCOPY N/A 2021    Procedure: Dx  Shant Fulling ;  Surgeon: Wanda Zheng MD;  [facility];  Service: Gynecology   • LIVER BIOPSY     • OOPHORECTOMY Left 2021   • OOPHORECTOMY Right    • PELVIC LAPAROSCOPY Left 2021    Procedure: SALPINGECTOMY, LAP; oophrectomy;  Surgeon: Wanda Zheng MD;  [facility];  Service: Gynecology   • WA LAMNOTMY INCL W/DCMPRSN NRV ROOT 1 INTRSPC LUMBR Right 11/10/2020    Procedure: LAMINECTOMY LUMBAR MINIMALLY INVASIVE, L5-S1, RIGHT;  Surgeon: Gearldean Severs, MD;  Location: [facility];  Service: Neurosurgery       Family History   Problem Relation Age of Onset   • Arthritis Mother    • Migraines Mother    • Arthritis Maternal Grandmother    • Parkinsonism Maternal Grandmother    • Psoriasis Daughter    • No Known Problems Father    • No Known Problems Maternal Grandfather    • No Known Problems Paternal Grandmother    • No Known Problems Paternal Grandfather    • No Known Problems Maternal Aunt Medications have been verified  Objective   /82 (BP Location: Left arm, Patient Position: Sitting)   Pulse 76   Temp (!) 96 2 °F (35 7 °C) (Tympanic)   Resp 18   LMP 07/09/2021   SpO2 99%   Patient's last menstrual period was 07/09/2021  Physical Exam     Physical Exam  Vitals reviewed  Constitutional:       Appearance: Normal appearance  She is normal weight  HENT:      Head: Normocephalic  Nose: Nose normal    Eyes:      General:         Right eye: Discharge present  Left eye: Discharge present  Conjunctiva/sclera:      Left eye: Left conjunctiva is injected  Pulmonary:      Effort: Pulmonary effort is normal    Neurological:      Mental Status: She is alert

## 2023-04-24 DIAGNOSIS — E03.9 HYPOTHYROIDISM, UNSPECIFIED TYPE: ICD-10-CM

## 2023-04-24 RX ORDER — LEVOTHYROXINE SODIUM 0.03 MG/1
TABLET ORAL
Qty: 60 TABLET | Refills: 0 | Status: SHIPPED | OUTPATIENT
Start: 2023-04-24

## 2023-05-22 DIAGNOSIS — I10 BENIGN ESSENTIAL HTN: ICD-10-CM

## 2023-05-22 RX ORDER — OLMESARTAN MEDOXOMIL AND HYDROCHLOROTHIAZIDE 20/12.5 20; 12.5 MG/1; MG/1
TABLET ORAL
Qty: 90 TABLET | Refills: 0 | Status: SHIPPED | OUTPATIENT
Start: 2023-05-22

## 2023-06-06 ENCOUNTER — APPOINTMENT (EMERGENCY)
Dept: RADIOLOGY | Facility: HOSPITAL | Age: 43
End: 2023-06-06
Payer: COMMERCIAL

## 2023-06-06 ENCOUNTER — TELEPHONE (OUTPATIENT)
Dept: FAMILY MEDICINE CLINIC | Facility: HOSPITAL | Age: 43
End: 2023-06-06

## 2023-06-06 ENCOUNTER — HOSPITAL ENCOUNTER (EMERGENCY)
Facility: HOSPITAL | Age: 43
Discharge: HOME | End: 2023-06-06
Attending: EMERGENCY MEDICINE
Payer: COMMERCIAL

## 2023-06-06 VITALS
DIASTOLIC BLOOD PRESSURE: 84 MMHG | HEIGHT: 62 IN | WEIGHT: 154 LBS | HEART RATE: 82 BPM | TEMPERATURE: 97.5 F | RESPIRATION RATE: 16 BRPM | OXYGEN SATURATION: 100 % | SYSTOLIC BLOOD PRESSURE: 126 MMHG | BODY MASS INDEX: 28.34 KG/M2

## 2023-06-06 DIAGNOSIS — R03.0 ELEVATED BLOOD PRESSURE READING: Primary | ICD-10-CM

## 2023-06-06 DIAGNOSIS — M25.512 LEFT SHOULDER PAIN: ICD-10-CM

## 2023-06-06 LAB
ALBUMIN SERPL BCP-MCNC: 4.9 G/DL (ref 3.5–5)
ALP SERPL-CCNC: 50 U/L (ref 34–104)
ALT SERPL W P-5'-P-CCNC: 20 U/L (ref 7–52)
ANION GAP SERPL CALCULATED.3IONS-SCNC: 7 MMOL/L (ref 4–13)
AST SERPL W P-5'-P-CCNC: 17 U/L (ref 13–39)
BASOPHILS # BLD AUTO: 0.04 THOUSANDS/ÂΜL (ref 0–0.1)
BASOPHILS NFR BLD AUTO: 1 % (ref 0–1)
BILIRUB SERPL-MCNC: 0.53 MG/DL (ref 0.2–1)
BILIRUB UR QL STRIP: NEGATIVE
BUN SERPL-MCNC: 15 MG/DL (ref 5–25)
CALCIUM SERPL-MCNC: 10.4 MG/DL (ref 8.4–10.2)
CARDIAC TROPONIN I PNL SERPL HS: <2 NG/L
CHLORIDE SERPL-SCNC: 103 MMOL/L (ref 96–108)
CLARITY UR: CLEAR
CO2 SERPL-SCNC: 29 MMOL/L (ref 21–32)
COLOR UR: NORMAL
CREAT SERPL-MCNC: 0.81 MG/DL (ref 0.6–1.3)
EOSINOPHIL # BLD AUTO: 0.12 THOUSAND/ÂΜL (ref 0–0.61)
EOSINOPHIL NFR BLD AUTO: 3 % (ref 0–6)
ERYTHROCYTE [DISTWIDTH] IN BLOOD BY AUTOMATED COUNT: 11.8 % (ref 11.6–15.1)
FLUAV RNA RESP QL NAA+PROBE: NEGATIVE
FLUBV RNA RESP QL NAA+PROBE: NEGATIVE
GFR SERPL CREATININE-BSD FRML MDRD: 88 ML/MIN/1.73SQ M
GLUCOSE SERPL-MCNC: 93 MG/DL (ref 65–140)
GLUCOSE UR STRIP-MCNC: NEGATIVE MG/DL
HCT VFR BLD AUTO: 45.7 % (ref 34.8–46.1)
HGB BLD-MCNC: 15.8 G/DL (ref 11.5–15.4)
HGB UR QL STRIP.AUTO: NEGATIVE
IMM GRANULOCYTES # BLD AUTO: 0.01 THOUSAND/UL (ref 0–0.2)
IMM GRANULOCYTES NFR BLD AUTO: 0 % (ref 0–2)
KETONES UR STRIP-MCNC: NEGATIVE MG/DL
LEUKOCYTE ESTERASE UR QL STRIP: NEGATIVE
LYMPHOCYTES # BLD AUTO: 2.24 THOUSANDS/ÂΜL (ref 0.6–4.47)
LYMPHOCYTES NFR BLD AUTO: 48 % (ref 14–44)
MCH RBC QN AUTO: 31 PG (ref 26.8–34.3)
MCHC RBC AUTO-ENTMCNC: 34.6 G/DL (ref 31.4–37.4)
MCV RBC AUTO: 90 FL (ref 82–98)
MONOCYTES # BLD AUTO: 0.21 THOUSAND/ÂΜL (ref 0.17–1.22)
MONOCYTES NFR BLD AUTO: 5 % (ref 4–12)
NEUTROPHILS # BLD AUTO: 1.95 THOUSANDS/ÂΜL (ref 1.85–7.62)
NEUTS SEG NFR BLD AUTO: 43 % (ref 43–75)
NITRITE UR QL STRIP: NEGATIVE
NRBC BLD AUTO-RTO: 0 /100 WBCS
PH UR STRIP.AUTO: 7.5 [PH]
PLATELET # BLD AUTO: 252 THOUSANDS/UL (ref 149–390)
PMV BLD AUTO: 9.7 FL (ref 8.9–12.7)
POTASSIUM SERPL-SCNC: 3.5 MMOL/L (ref 3.5–5.3)
PROT SERPL-MCNC: 8.5 G/DL (ref 6.4–8.4)
PROT UR STRIP-MCNC: NEGATIVE MG/DL
RBC # BLD AUTO: 5.1 MILLION/UL (ref 3.81–5.12)
RSV RNA RESP QL NAA+PROBE: NEGATIVE
SARS-COV-2 RNA RESP QL NAA+PROBE: NEGATIVE
SODIUM SERPL-SCNC: 139 MMOL/L (ref 135–147)
SP GR UR STRIP.AUTO: 1.01 (ref 1–1.03)
UROBILINOGEN UR STRIP-ACNC: <2 MG/DL
WBC # BLD AUTO: 4.57 THOUSAND/UL (ref 4.31–10.16)

## 2023-06-06 PROCEDURE — 84484 ASSAY OF TROPONIN QUANT: CPT | Performed by: EMERGENCY MEDICINE

## 2023-06-06 PROCEDURE — 93005 ELECTROCARDIOGRAM TRACING: CPT

## 2023-06-06 PROCEDURE — 80053 COMPREHEN METABOLIC PANEL: CPT | Performed by: EMERGENCY MEDICINE

## 2023-06-06 PROCEDURE — 71045 X-RAY EXAM CHEST 1 VIEW: CPT

## 2023-06-06 PROCEDURE — 36415 COLL VENOUS BLD VENIPUNCTURE: CPT

## 2023-06-06 PROCEDURE — 81003 URINALYSIS AUTO W/O SCOPE: CPT | Performed by: PHYSICIAN ASSISTANT

## 2023-06-06 PROCEDURE — 0241U HB NFCT DS VIR RESP RNA 4 TRGT: CPT | Performed by: PHYSICIAN ASSISTANT

## 2023-06-06 PROCEDURE — 85025 COMPLETE CBC W/AUTO DIFF WBC: CPT | Performed by: EMERGENCY MEDICINE

## 2023-06-06 RX ORDER — METHOCARBAMOL 500 MG/1
500 TABLET, FILM COATED ORAL 3 TIMES DAILY
Qty: 20 TABLET | Refills: 0 | Status: SHIPPED | OUTPATIENT
Start: 2023-06-06 | End: 2023-06-12 | Stop reason: ALTCHOICE

## 2023-06-06 NOTE — Clinical Note
Julee La was seen and treated in our emergency department on 6/6/2023  Diagnosis:     Shira Hays  may return to work on return date  She may return on this date: 06/07/2023         If you have any questions or concerns, please don't hesitate to call        Michael Dominguez PA-C    ______________________________           _______________          _______________  Hospital Representative                              Date                                Time

## 2023-06-06 NOTE — TELEPHONE ENCOUNTER
Patient was in ER today and was advised to make F/U appointment with Dr Everton Zepeda next week  No availability all week  Patient is available all day Monday and all day Wednesday (June 12, June 14)    Please advise

## 2023-06-06 NOTE — ED PROVIDER NOTES
"History  Chief Complaint   Patient presents with   • High Blood Pressure     Pt to er with complaints of feeling \"hot\" yesterday and \"feeling off\"  Took her bp and her diastolic was 40'R-221  Has left shoulder pressure and left chest pressure  Took her bp meds this am, and bp was still high  Patient is a 40 y/o F with h/o HTN that presents to the ED with high blood pressure  She states yesterday she felt hot and nauseated all day  SHe took her blood pressure and diastolic was elevated in 48M to 100s  She states she has been taking her blood pressure medication as prescribed  Two weeks ago she did start taking a new dietary supplement called bravenly  Last night she developed chest pain and left shoulder pain  SHe states her  rubbed her shoulder and she felt better and was able to go back to sleep  History provided by:  Patient  Hypertension  Severity:  Moderate  Onset quality:  Gradual  Duration:  2 days  Timing:  Constant  Progression:  Unchanged  Chronicity:  New  Context: OTC medication used    Relieved by:  Nothing  Worsened by:  Nothing  Ineffective treatments:  None tried  Associated symptoms: chest pain and nausea    Associated symptoms: no abdominal pain, no blurred vision, no confusion, no dizziness, no fatigue, no headaches, no neck pain, no palpitations, no peripheral edema, no shortness of breath, not vomiting and no weakness    Risk factors: no cardiac disease        Prior to Admission Medications   Prescriptions Last Dose Informant Patient Reported? Taking?    Multiple Vitamins-Minerals (multivitamin with minerals) tablet   Yes No   Sig: Take 1 tablet by mouth daily   conjugated estrogens (Premarin) 0 45 mg tablet   No No   Sig: Take 1 tablet (0 45 mg total) by mouth daily Take daily   fluticasone (FLONASE) 50 mcg/act nasal spray   Yes No   Si spray into each nostril daily   levothyroxine 25 mcg tablet   No No   Sig: TAKE 1 TABLET BY MOUTH ONCE DAILY IN THE MORNING " loratadine (CLARITIN) 10 mg tablet  Self Yes No   Sig: Take 10 mg by mouth daily   olmesartan-hydrochlorothiazide (BENICAR HCT) 20-12 5 MG per tablet   No No   Sig: Take 1 tablet by mouth once daily   polymyxin b-trimethoprim (POLYTRIM) ophthalmic solution   No No   Sig: Administer 1 drop to both eyes every 4 (four) hours while awake      Facility-Administered Medications: None       Past Medical History:   Diagnosis Date   • Allergic    • COVID-19    • Disease of thyroid gland    • Hypertension    • PONV (postoperative nausea and vomiting)    • PONV (postoperative nausea and vomiting) 11/10/2020       Past Surgical History:   Procedure Laterality Date   •  SECTION  ,    • CHOLECYSTECTOMY     • HYSTERECTOMY N/A 2021    Procedure: Eduarda Smith, cystoscopy;  Surgeon: Kneny Lomeli MD;  Location: [facility];  Service: Gynecology   • LAPAROSCOPY N/A 2021    Procedure: Sol Holman ;  Surgeon: Kenny Lomeli MD;  Location: [facility];  Service: Gynecology   • LIVER BIOPSY     • OOPHORECTOMY Left 2021   • OOPHORECTOMY Right    • PELVIC LAPAROSCOPY Left 2021    Procedure: SALPINGECTOMY, LAP; oophrectomy;  Surgeon: Kenny Lomeli MD;  Location: [facility];  Service: Gynecology   • OK LAMNOTMY INCL W/DCMPRSN NRV ROOT 1 INTRSPC LUMBR Right 11/10/2020    Procedure: LAMINECTOMY LUMBAR MINIMALLY INVASIVE, L5-S1, RIGHT;  Surgeon: Amina Roth MD;  Location: [facility];  Service: Neurosurgery       Family History   Problem Relation Age of Onset   • Arthritis Mother    • Migraines Mother    • Arthritis Maternal Grandmother    • Parkinsonism Maternal Grandmother    • Psoriasis Daughter    • No Known Problems Father    • No Known Problems Maternal Grandfather    • No Known Problems Paternal Grandmother    • No Known Problems Paternal Grandfather    • No Known Problems Maternal Aunt      I have reviewed and agree with the history as documented      E-Cigarette/Vaping   • E-Cigarette Use Never User      E-Cigarette/Vaping Substances   • Nicotine No    • THC No    • CBD No    • Flavoring No    • Other No    • Unknown No      Social History     Tobacco Use   • Smoking status: Never   • Smokeless tobacco: Never   Vaping Use   • Vaping Use: Never used   Substance Use Topics   • Alcohol use: Not Currently     Alcohol/week: 1 0 standard drink of alcohol     Types: 1 Standard drinks or equivalent per week     Comment: Very Occasionally - not even once a month   • Drug use: Never       Review of Systems   Constitutional: Negative for chills and fatigue  HENT: Negative  Eyes: Negative for blurred vision and visual disturbance  Respiratory: Negative for cough and shortness of breath  Cardiovascular: Positive for chest pain  Negative for palpitations and leg swelling  Gastrointestinal: Positive for nausea  Negative for abdominal pain, diarrhea and vomiting  Genitourinary: Negative for dysuria  Musculoskeletal: Negative for back pain and neck pain  Left shoulder pain   Skin: Negative for color change, pallor and rash  Neurological: Negative for dizziness, seizures, syncope, facial asymmetry, speech difficulty, weakness, light-headedness and headaches  Psychiatric/Behavioral: Negative for confusion  All other systems reviewed and are negative  Physical Exam  Physical Exam  Vitals and nursing note reviewed  Constitutional:       General: She is not in acute distress  Appearance: Normal appearance  She is well-developed, well-groomed and normal weight  She is not ill-appearing or diaphoretic  HENT:      Head: Normocephalic and atraumatic  Right Ear: External ear normal       Left Ear: External ear normal       Nose: Nose normal       Mouth/Throat:      Mouth: Mucous membranes are moist       Pharynx: Oropharynx is clear     Eyes:      Conjunctiva/sclera: Conjunctivae normal       Pupils: Pupils are equal    Cardiovascular:      Rate and Rhythm: Normal rate and regular rhythm  Heart sounds: Normal heart sounds  Pulmonary:      Effort: Pulmonary effort is normal       Breath sounds: Normal breath sounds  No wheezing, rhonchi or rales  Chest:      Chest wall: No swelling or tenderness  Abdominal:      General: Abdomen is flat  Bowel sounds are normal       Palpations: Abdomen is soft  Tenderness: There is no abdominal tenderness  Musculoskeletal:         General: No tenderness  Normal range of motion  Cervical back: Normal range of motion and neck supple  Right lower leg: No edema  Left lower leg: No edema  Skin:     General: Skin is warm and dry  Coloration: Skin is not jaundiced or pale  Findings: No rash  Neurological:      General: No focal deficit present  Mental Status: She is alert and oriented to person, place, and time  Cranial Nerves: No cranial nerve deficit  Motor: No weakness  Psychiatric:         Mood and Affect: Mood is anxious  Behavior: Behavior is cooperative           Vital Signs  ED Triage Vitals [06/06/23 0756]   Temperature Pulse Respirations Blood Pressure SpO2   97 5 °F (36 4 °C) (!) 106 18 (!) 175/114 100 %      Temp Source Heart Rate Source Patient Position - Orthostatic VS BP Location FiO2 (%)   Oral Monitor Lying Right arm --      Pain Score       --           Vitals:    06/06/23 0800 06/06/23 0830 06/06/23 0900 06/06/23 0909   BP: (!) 151/101 142/89 126/84 126/84   Pulse: 94 80 82    Patient Position - Orthostatic VS:             Visual Acuity      ED Medications  Medications - No data to display    Diagnostic Studies  Results Reviewed     Procedure Component Value Units Date/Time    FLU/RSV/COVID - if FLU/RSV clinically relevant [[id number]]  (Normal) Collected: 06/06/23 0828    Lab Status: Final result Specimen: Nares from Nose Updated: 06/06/23 0913     SARS-CoV-2 Negative     INFLUENZA A PCR Negative     INFLUENZA B PCR Negative     RSV PCR Negative    Narrative:      FOR PEDIATRIC PATIENTS - copy/paste COVID Guidelines URL to browser: [email or web address]  [email or web address]    SARS-CoV-2 assay is a Nucleic Acid Amplification assay intended for the  qualitative detection of nucleic acid from SARS-CoV-2 in nasopharyngeal  swabs  Results are for the presumptive identification of SARS-CoV-2 RNA  Positive results are indicative of infection with SARS-CoV-2, the virus  causing COVID-19, but do not rule out bacterial infection or co-infection  with other viruses  Laboratories within the [place] and its  territories are required to report all positive results to the appropriate  public health authorities  Negative results do not preclude SARS-CoV-2  infection and should not be used as the sole basis for treatment or other  patient management decisions  Negative results must be combined with  clinical observations, patient history, and epidemiological information  This test has not been FDA cleared or approved  This test has been authorized by FDA under an Emergency Use Authorization  (EUA)  This test is only authorized for the duration of time the  declaration that circumstances exist justifying the authorization of the  emergency use of an in vitro diagnostic tests for detection of SARS-CoV-2  virus and/or diagnosis of COVID-19 infection under section 564(b)(1) of  the Act, 21 U  S C  831MHQ-7(S)(4), unless the authorization is terminated  or revoked sooner  The test has been validated but independent review by FDA  and CLIA is pending  Test performed using [employer] GeneXpert: This RT-PCR assay targets N2,  a region unique to SARS-CoV-2  A conserved region in the E-gene was chosen  for pan-Sarbecovirus detection which includes SARS-CoV-2  According to CMS-2020-01-R, this platform meets the definition of high-throughput technology      HS Troponin 0hr (reflex protocol) [[id number]]  (Normal) Collected: 06/06/23 0755    Lab Status: Final result Specimen: Blood from Arm, Left Updated: 06/06/23 0844     hs TnI 0hr <2 ng/L     Comprehensive metabolic panel [[id number]]  (Abnormal) Collected: 06/06/23 0759    Lab Status: Final result Specimen: Blood from Arm, Left Updated: 06/06/23 0836     Sodium 139 mmol/L      Potassium 3 5 mmol/L      Chloride 103 mmol/L      CO2 29 mmol/L      ANION GAP 7 mmol/L      BUN 15 mg/dL      Creatinine 0 81 mg/dL      Glucose 93 mg/dL      Calcium 10 4 mg/dL      AST 17 U/L      ALT 20 U/L      Alkaline Phosphatase 50 U/L      Total Protein 8 5 g/dL      Albumin 4 9 g/dL      Total Bilirubin 0 53 mg/dL      eGFR 88 ml/min/1 73sq m     Narrative:      [place] guidelines for Chronic Kidney Disease (CKD):   •  Stage 1 with normal or high GFR (GFR > 90 mL/min/1 73 square meters)  •  Stage 2 Mild CKD (GFR = 60-89 mL/min/1 73 square meters)  •  Stage 3A Moderate CKD (GFR = 45-59 mL/min/1 73 square meters)  •  Stage 3B Moderate CKD (GFR = 30-44 mL/min/1 73 square meters)  •  Stage 4 Severe CKD (GFR = 15-29 mL/min/1 73 square meters)  •  Stage 5 End Stage CKD (GFR <15 mL/min/1 73 square meters)  Note: GFR calculation is accurate only with a steady state creatinine    UA w Reflex to Microscopic w Reflex to Culture [[id number]] Collected: 06/06/23 0828    Lab Status: Final result Specimen: Urine, Clean Catch Updated: 06/06/23 0835     Color, UA Light Yellow     Clarity, UA Clear     Specific Gravity, UA 1 010     pH, UA 7 5     Leukocytes, UA Negative     Nitrite, UA Negative     Protein, UA Negative mg/dl      Glucose, UA Negative mg/dl      Ketones, UA Negative mg/dl      Urobilinogen, UA <2 0 mg/dl      Bilirubin, UA Negative     Occult Blood, UA Negative    CBC and differential [[id number]]  (Abnormal) Collected: 06/06/23 0759    Lab Status: Final result Specimen: Blood from Arm, Left Updated: 06/06/23 0806     WBC 4 57 Thousand/uL      RBC 5 10 Million/uL      Hemoglobin 15 8 g/dL      Hematocrit 45 7 % MCV 90 fL      MCH 31 0 pg      MCHC 34 6 g/dL      RDW 11 8 %      MPV 9 7 fL      Platelets 173 Thousands/uL      nRBC 0 /100 WBCs      Neutrophils Relative 43 %      Immat GRANS % 0 %      Lymphocytes Relative 48 %      Monocytes Relative 5 %      Eosinophils Relative 3 %      Basophils Relative 1 %      Neutrophils Absolute 1 95 Thousands/µL      Immature Grans Absolute 0 01 Thousand/uL      Lymphocytes Absolute 2 24 Thousands/µL      Monocytes Absolute 0 21 Thousand/µL      Eosinophils Absolute 0 12 Thousand/µL      Basophils Absolute 0 04 Thousands/µL                  XR chest 1 view portable   ED Interpretation by Lisy Collado PA-C (06/06 0796)   No acute abnormalities  Final Result by Jimena Lyle MD (06/06 3672)      No acute cardiopulmonary disease  Workstation performed: [id number]                    Procedures  ECG 12 Lead Documentation Only    Date/Time: 6/6/2023 8:10 AM    Performed by: Lisy Collado PA-C  Authorized by: Lisy Collado PA-C    Indications / Diagnosis:  Chest pain  ECG reviewed by me, the ED Provider: yes    Patient location:  ED  Previous ECG:     Previous ECG:  Compared to current    Similarity:  No change  Rate:     ECG rate:  87  Rhythm:     Rhythm: sinus rhythm    Conduction:     Conduction: normal    ST segments:     ST segments:  Normal  T waves:     T waves: normal               ED Course             HEART Risk Score    Flowsheet Row Most Recent Value   Heart Score Risk Calculator    History 0 Filed at: 06/06/2023 0935   ECG 0 Filed at: 06/06/2023 0935   Age 0 Filed at: 06/06/2023 0935   Risk Factors 1 Filed at: 06/06/2023 0935   Troponin 0 Filed at: 06/06/2023 0935   HEART Score 1 Filed at: 06/06/2023 0935                        SBIRT 22yo+    Flowsheet Row Most Recent Value   Initial Alcohol Screen: US AUDIT-C     1  How often do you have a drink containing alcohol? 0 Filed at: 06/06/2023 0757   2   How many drinks containing alcohol do you have on a typical day you are drinking? 0 Filed at: 06/06/2023 0757   3a  Male UNDER 65: How often do you have five or more drinks on one occasion? 0 Filed at: 06/06/2023 0757   3b  FEMALE Any Age, or MALE 65+: How often do you have 4 or more drinks on one occassion? 0 Filed at: 06/06/2023 0757   Audit-C Score 0 Filed at: 06/06/2023 3864   SEPIDEH: How many times in the past year have you    Used an illegal drug or used a prescription medication for non-medical reasons? Never Filed at: 06/06/2023 2973                    Medical Decision Making  Patient presents with high blood pressure and chest pain, will order labs, EKG, CXR to r/o cardiopulmonary disease, renal disease  EKG showing no changes from prior, trop normal, HEART score less than 4, will discharge with close f/u with PCP  BP improved without intervention, patient instructed to monitor at home and f/u with PCP  HTN most likely related to new supplements which contains caffeine  Patient instructed to d/c supplements  Return precautions given  Will prescribe muscle relaxant for shoulder pain  Elevated blood pressure reading: acute illness or injury  Left shoulder pain: acute illness or injury  Amount and/or Complexity of Data Reviewed  External Data Reviewed: ECG  Labs: ordered  Radiology: ordered and independent interpretation performed  ECG/medicine tests: ordered and independent interpretation performed  Risk  Prescription drug management            Disposition  Final diagnoses:   Elevated blood pressure reading   Left shoulder pain     Time reflects when diagnosis was documented in both MDM as applicable and the Disposition within this note     Time User Action Codes Description Comment    6/6/2023  9:22 AM Darren Peto Add [R03 0] Elevated blood pressure reading     6/6/2023  9:28 AM Darren Peto Add [M25 512] Left shoulder pain       ED Disposition     ED Disposition   Discharge    Condition Stable    Date/Time   Tue Jun 6, 2023  9:22 AM    Comment   Candida Cesardie discharge to home/self care  Follow-up Information     Follow up With Specialties Details Why Contact Info Additional Information    Jake Cogan, MD Family Medicine Schedule an appointment as soon as possible for a visit in 1 week For aissatou [place] [address]  [address] [phone]        [address] Emergency Department Emergency Medicine Go to  If symptoms worsen [address] [place],[address]D [address]  [address] [facility] Emergency Department, [address] [place], [facility] [address]          Discharge Medication List as of 6/6/2023  9:28 AM      START taking these medications    Details   methocarbamol (ROBAXIN) 500 mg tablet Take 1 tablet (500 mg total) by mouth 3 (three) times a day, Starting Tue 6/6/2023, Normal         CONTINUE these medications which have NOT CHANGED    Details   conjugated estrogens (Premarin) 0 45 mg tablet Take 1 tablet (0 45 mg total) by mouth daily Take daily, Starting Mon 8/15/2022, Normal      fluticasone (FLONASE) 50 mcg/act nasal spray 1 spray into each nostril daily, Historical Med      levothyroxine 25 mcg tablet TAKE 1 TABLET BY MOUTH ONCE DAILY IN THE MORNING, Normal      loratadine (CLARITIN) 10 mg tablet Take 10 mg by mouth daily, Historical Med      Multiple Vitamins-Minerals (multivitamin with minerals) tablet Take 1 tablet by mouth daily, Historical Med      olmesartan-hydrochlorothiazide (BENICAR HCT) 20-12 5 MG per tablet Take 1 tablet by mouth once daily, Normal      polymyxin b-trimethoprim (POLYTRIM) ophthalmic solution Administer 1 drop to both eyes every 4 (four) hours while awake, Starting Sat 4/8/2023, Normal             No discharge procedures on file      PDMP Review       Value Time User    PDMP Reviewed  Yes 7/2/2021  2:06 PM Manda De La Rosa MD          ED Provider  Electronically Signed by           Thomas Blackwood PA-C  06/06/23 8474

## 2023-06-06 NOTE — DISCHARGE INSTRUCTIONS
Monitor your blood pressure twice a day  Stop taking the supplement  Follow up with family doctor in 1 week for recheck  Return to ER if /110  Take robaxin as needed for muscle pain

## 2023-06-06 NOTE — TELEPHONE ENCOUNTER
ED follow up  for B/P issues    ED today  6/6  Dr Nate Joshua patient  no appt available        appt  6/12   920 appt   please advise

## 2023-06-07 LAB
ATRIAL RATE: 87 BPM
P AXIS: 65 DEGREES
PR INTERVAL: 174 MS
QRS AXIS: 101 DEGREES
QRSD INTERVAL: 96 MS
QT INTERVAL: 396 MS
QTC INTERVAL: 476 MS
T WAVE AXIS: 55 DEGREES
VENTRICULAR RATE: 87 BPM

## 2023-06-07 PROCEDURE — 93010 ELECTROCARDIOGRAM REPORT: CPT | Performed by: INTERNAL MEDICINE

## 2023-06-12 ENCOUNTER — OFFICE VISIT (OUTPATIENT)
Dept: FAMILY MEDICINE CLINIC | Facility: HOSPITAL | Age: 43
End: 2023-06-12
Payer: COMMERCIAL

## 2023-06-12 VITALS
WEIGHT: 155 LBS | HEIGHT: 62 IN | SYSTOLIC BLOOD PRESSURE: 128 MMHG | HEART RATE: 81 BPM | DIASTOLIC BLOOD PRESSURE: 84 MMHG | BODY MASS INDEX: 28.52 KG/M2

## 2023-06-12 DIAGNOSIS — I10 BENIGN ESSENTIAL HTN: Primary | ICD-10-CM

## 2023-06-12 PROCEDURE — 99214 OFFICE O/P EST MOD 30 MIN: CPT | Performed by: NURSE PRACTITIONER

## 2023-06-12 NOTE — PROGRESS NOTES
Name: Demetrice Mcginnis      : 1980      MRN: [id number]  Encounter Provider: JESS Graves  Encounter Date: 2023   Encounter department: [facility] [place] [facility]\"" Dr Garcia  Benign essential HTN    S/P ED eval - negative cardiac work up  Records available, reviewed/discussed w/pt  Reassured her, home readings have returned to usual baseline - hesitate to titrate dose given episodic low readings & advise she continue same olmesartan/HCTZ as currently rx'd by Dr Sam Gasca to monitor at home and call w/persistently elevated readings  Reviewed red flag sx's warranting eval in ED  Remain off OTC caffeine supplement  Return in 3 months for follow up - due to update annual PE       Subjective      Was seen in ED on  when she wasn't feeling well and had concerns for her high BP  Had been taking an OTC supplement w/natural caffeine in it for @2 5 weeks and was advised to stop taking  She hasn't taken it since and has been feeling better  Doesn't normally consume caffeine  Has been checking BP at home and is fluctuating in diastolic normally in high 90s  Last week was 100 and had discomfort in left shoulder blade  Compliant w/BP med every morning  BP at home since last week has been 103-141/80s-96  Review of Systems   Constitutional: Negative  Respiratory: Negative  Cardiovascular: Negative  Neurological: Positive for dizziness (at times, sometimes positional) and light-headedness (at times, sometimes positional)         Current Outpatient Medications on File Prior to Visit   Medication Sig   • conjugated estrogens (Premarin) 0 45 mg tablet Take 1 tablet (0 45 mg total) by mouth daily Take daily   • fluticasone (FLONASE) 50 mcg/act nasal spray 1 spray into each nostril daily   • levothyroxine 25 mcg tablet TAKE 1 TABLET BY MOUTH ONCE DAILY IN THE MORNING   • loratadine (CLARITIN) 10 mg tablet Take 10 mg by mouth daily   • Multiple Vitamins-Minerals "(multivitamin with minerals) tablet Take 1 tablet by mouth daily   • olmesartan-hydrochlorothiazide (BENICAR HCT) 20-12 5 MG per tablet Take 1 tablet by mouth once daily   • polymyxin b-trimethoprim (POLYTRIM) ophthalmic solution Administer 1 drop to both eyes every 4 (four) hours while awake (Patient not taking: Reported on 6/12/2023)   • [DISCONTINUED] methocarbamol (ROBAXIN) 500 mg tablet Take 1 tablet (500 mg total) by mouth 3 (three) times a day (Patient not taking: Reported on 6/12/2023)       Objective     /84   Pulse 81   Ht 5' 2\" (1 575 m)   Wt 70 3 kg (155 lb)   LMP 07/09/2021   BMI 28 35 kg/m²       Physical Exam  Vitals reviewed  Constitutional:       General: She is not in acute distress  Appearance: Normal appearance  HENT:      Head: Normocephalic  Eyes:      General: No scleral icterus  Neck:      Thyroid: No thyromegaly  Cardiovascular:      Rate and Rhythm: Normal rate and regular rhythm  Heart sounds: No murmur heard  Pulmonary:      Effort: Pulmonary effort is normal  No respiratory distress  Breath sounds: Normal breath sounds  Musculoskeletal:      Cervical back: Normal range of motion  Right lower leg: No edema  Left lower leg: No edema  Lymphadenopathy:      Cervical: No cervical adenopathy  Skin:     General: Skin is warm and dry  Neurological:      General: No focal deficit present  Mental Status: She is alert and oriented to person, place, and time  Psychiatric:         Mood and Affect: Mood normal          Behavior: Behavior normal          Thought Content:  Thought content normal          Judgment: Judgment normal           JESS Malave  "

## 2023-06-15 DIAGNOSIS — E03.9 HYPOTHYROIDISM, UNSPECIFIED TYPE: ICD-10-CM

## 2023-06-15 RX ORDER — LEVOTHYROXINE SODIUM 0.03 MG/1
TABLET ORAL
Qty: 60 TABLET | Refills: 0 | Status: SHIPPED | OUTPATIENT
Start: 2023-06-15

## 2023-06-29 DIAGNOSIS — E03.9 HYPOTHYROIDISM, UNSPECIFIED TYPE: ICD-10-CM

## 2023-06-29 RX ORDER — LEVOTHYROXINE SODIUM 0.03 MG/1
25 TABLET ORAL EVERY MORNING
Qty: 90 TABLET | Refills: 0 | OUTPATIENT
Start: 2023-06-29

## 2023-06-30 DIAGNOSIS — E03.9 HYPOTHYROIDISM, UNSPECIFIED TYPE: ICD-10-CM

## 2023-06-30 RX ORDER — LEVOTHYROXINE SODIUM 0.03 MG/1
25 TABLET ORAL EVERY MORNING
Qty: 90 TABLET | Refills: 3 | Status: SHIPPED | OUTPATIENT
Start: 2023-06-30 | End: 2023-08-23

## 2023-08-13 DIAGNOSIS — I10 BENIGN ESSENTIAL HTN: ICD-10-CM

## 2023-08-15 NOTE — PROGRESS NOTES
[facility] [address] [place] [address]    ASSESSMENT/PLAN: Aman White is a 39 y.o.  who presents for annual gynecologic exam.    Encounter for routine gynecologic examination  - Routine well woman exam completed today. - Cervical Cancer Screening: Current ASCCP Guidelines reviewed. Last Pap: 2019 . Next Pap Due: discontinue s/p hysterectomy  - Breast Cancer Screening: Last Mammogram 08/15/2022, mammo ordered    Additional problems addressed at this visit:  1. Encounter for screening mammogram for malignant neoplasm of breast  -     Mammo screening bilateral w 3d & cad; Future; Expected date: 2024    2. Encounter for well woman exam with routine gynecological exam          CC:  Annual Gynecologic Examination    HPI: Aman White is a 39 y.o. [id number] who presents for annual gynecologic examination. HPI  Doing well, no GYN complaints  The following portions of the patient's history were reviewed and updated as appropriate: allergies, current medications, past family history, past medical history, obstetric history, gynecologic history, past social history, past surgical history and problem list.    Review of Systems   Constitutional: Negative. HENT: Negative. Eyes: Negative. Respiratory: Negative. Cardiovascular: Negative. Gastrointestinal: Negative. Genitourinary: Negative. Musculoskeletal: Negative. All other systems reviewed and are negative. Objective:  /78   Ht 5' 2" (1.575 m)   Wt 70.3 kg (155 lb)   LMP 2021   BMI 28.35 kg/m²    Physical Exam  Vitals reviewed. Constitutional:       Appearance: Normal appearance. Cardiovascular:      Rate and Rhythm: Normal rate. Pulmonary:      Effort: Pulmonary effort is normal. No respiratory distress. Genitourinary:     Comments: Pelvic exam deferred, s/p hysterectomy, BSO  Neurological:      Mental Status: She is alert.    Psychiatric:         Mood and Affect: Mood normal.         Behavior: Behavior normal.             Rosa Morales  [facility]  08/16/23 11:21 AM

## 2023-08-16 ENCOUNTER — OFFICE VISIT (OUTPATIENT)
Dept: FAMILY MEDICINE CLINIC | Facility: HOSPITAL | Age: 43
End: 2023-08-16
Payer: COMMERCIAL

## 2023-08-16 ENCOUNTER — ANNUAL EXAM (OUTPATIENT)
Dept: OBGYN CLINIC | Facility: MEDICAL CENTER | Age: 43
End: 2023-08-16
Payer: COMMERCIAL

## 2023-08-16 VITALS
WEIGHT: 153.8 LBS | HEART RATE: 84 BPM | DIASTOLIC BLOOD PRESSURE: 70 MMHG | BODY MASS INDEX: 28.3 KG/M2 | SYSTOLIC BLOOD PRESSURE: 120 MMHG | HEIGHT: 62 IN | TEMPERATURE: 98.5 F

## 2023-08-16 VITALS
BODY MASS INDEX: 28.52 KG/M2 | DIASTOLIC BLOOD PRESSURE: 78 MMHG | SYSTOLIC BLOOD PRESSURE: 126 MMHG | HEIGHT: 62 IN | WEIGHT: 155 LBS

## 2023-08-16 DIAGNOSIS — J30.9 ALLERGIC RHINITIS, UNSPECIFIED SEASONALITY, UNSPECIFIED TRIGGER: ICD-10-CM

## 2023-08-16 DIAGNOSIS — I10 BENIGN ESSENTIAL HTN: ICD-10-CM

## 2023-08-16 DIAGNOSIS — Z12.31 ENCOUNTER FOR SCREENING MAMMOGRAM FOR MALIGNANT NEOPLASM OF BREAST: Primary | ICD-10-CM

## 2023-08-16 DIAGNOSIS — Z01.419 ENCOUNTER FOR WELL WOMAN EXAM WITH ROUTINE GYNECOLOGICAL EXAM: ICD-10-CM

## 2023-08-16 DIAGNOSIS — Z00.00 ANNUAL PHYSICAL EXAM: Primary | ICD-10-CM

## 2023-08-16 DIAGNOSIS — E89.40 SURGICAL MENOPAUSE: ICD-10-CM

## 2023-08-16 DIAGNOSIS — R42 DIZZINESS: ICD-10-CM

## 2023-08-16 DIAGNOSIS — Z11.1 ENCOUNTER FOR PPD TEST: ICD-10-CM

## 2023-08-16 DIAGNOSIS — E03.9 HYPOTHYROIDISM, UNSPECIFIED TYPE: ICD-10-CM

## 2023-08-16 PROCEDURE — 99396 PREV VISIT EST AGE 40-64: CPT | Performed by: FAMILY MEDICINE

## 2023-08-16 PROCEDURE — 99396 PREV VISIT EST AGE 40-64: CPT | Performed by: OBSTETRICS & GYNECOLOGY

## 2023-08-16 PROCEDURE — 86580 TB INTRADERMAL TEST: CPT

## 2023-08-16 RX ORDER — OLMESARTAN MEDOXOMIL AND HYDROCHLOROTHIAZIDE 20/12.5 20; 12.5 MG/1; MG/1
TABLET ORAL
Qty: 90 TABLET | Refills: 0 | Status: SHIPPED | OUTPATIENT
Start: 2023-08-16

## 2023-08-16 NOTE — PROGRESS NOTES
Name: Geetha Bangura      : 1980      MRN: [id number]  Encounter Provider: Shruthi Tinsley MD  Encounter Date: 2023   Encounter department: [address]     1. Annual physical exam    2. Encounter for PPD test  -     TB Skin Test    3. Hypothyroidism, unspecified type  -     TSH, 3rd generation with Free T4 reflex; Future  -     TSH, 3rd generation with Free T4 reflex    4. Benign essential HTN  -     CBC; Future  -     Comprehensive metabolic panel; Future  -     Lipid Panel with Direct LDL reflex; Future  -     CBC  -     Comprehensive metabolic panel  -     Lipid Panel with Direct LDL reflex    5. Allergic rhinitis, unspecified seasonality, unspecified trigger    6. Dizziness        Depression Screening and Follow-up Plan: Patient was screened for depression during today's encounter. They screened negative with a PHQ-2 score of 0.    with short intermittent periods of dizziness. Does not appear to be due to orhtostasis/htn. Concern more due to allergies and middle ear issue. ? bppv . Treat allergies. Continue with flonase. Will try xyzal otc for now.l   Monitor. Update labs. Subjective      Here for PE. Overall doing well. However has had some periods of dizziness. May be with sudden movements of the head but not always. Not necessarily related to sudden standing up. She is on benicar hctz. Has been on this for a while. She does have allergic rhinitis. Has been using lfonase and claritin. One time did use xyzal and appeared to work better. Review of Systems   Constitutional: Negative. Negative for activity change, appetite change, chills, diaphoresis, fatigue and fever. HENT: Negative for congestion, facial swelling and sore throat. Respiratory: Negative. Negative for apnea, cough, chest tightness and shortness of breath. Cardiovascular: Negative. Negative for chest pain and palpitations. Gastrointestinal: Negative. Negative for abdominal distention, abdominal pain, blood in stool, constipation, diarrhea and nausea. Genitourinary: Negative. Negative for difficulty urinating, dysuria, flank pain and frequency. Current Outpatient Medications on File Prior to Visit   Medication Sig   • conjugated estrogens (Premarin) 0.45 mg tablet Take 1 tablet (0.45 mg total) by mouth daily Take daily   • fluticasone (FLONASE) 50 mcg/act nasal spray 1 spray into each nostril daily   • levothyroxine 25 mcg tablet Take 1 tablet (25 mcg total) by mouth every morning   • Multiple Vitamins-Minerals (multivitamin with minerals) tablet Take 1 tablet by mouth daily   • olmesartan-hydrochlorothiazide (BENICAR HCT) 20-12.5 MG per tablet Take 1 tablet by mouth once daily   • [DISCONTINUED] loratadine (CLARITIN) 10 mg tablet Take 10 mg by mouth daily   • [DISCONTINUED] conjugated estrogens (Premarin) 0.45 mg tablet Take 1 tablet (0.45 mg total) by mouth daily Take daily   • [DISCONTINUED] olmesartan-hydrochlorothiazide (BENICAR HCT) 20-12.5 MG per tablet Take 1 tablet by mouth once daily   • [DISCONTINUED] polymyxin b-trimethoprim (POLYTRIM) ophthalmic solution Administer 1 drop to both eyes every 4 (four) hours while awake (Patient not taking: Reported on 6/12/2023)       Objective     /70   Pulse 84   Temp 98.5 °F (36.9 °C)   Ht 5' 2" (1.575 m)   Wt 69.8 kg (153 lb 12.8 oz)   LMP 07/09/2021   BMI 28.13 kg/m²     Physical Exam  Vitals and nursing note reviewed. Constitutional:       Appearance: Normal appearance. HENT:      Head: Normocephalic. Right Ear: Tympanic membrane, ear canal and external ear normal.      Left Ear: Tympanic membrane, ear canal and external ear normal.      Nose: Nose normal.      Mouth/Throat:      Mouth: Mucous membranes are moist.   Eyes:      Extraocular Movements: Extraocular movements intact. Pupils: Pupils are equal, round, and reactive to light. Cardiovascular:      Rate and Rhythm: Normal rate and regular rhythm. Pulses: Normal pulses. Heart sounds: Normal heart sounds. Pulmonary:      Effort: Pulmonary effort is normal.      Breath sounds: Normal breath sounds. Abdominal:      General: Bowel sounds are normal.      Palpations: Abdomen is soft. Musculoskeletal:         General: Normal range of motion. Cervical back: Normal range of motion and neck supple. Skin:     General: Skin is warm. Capillary Refill: Capillary refill takes less than 2 seconds. Neurological:      General: No focal deficit present. Mental Status: She is alert and oriented to person, place, and time.    Psychiatric:         Mood and Affect: Mood normal.         Behavior: Behavior normal.       Dequan Wang MD

## 2023-08-17 ENCOUNTER — HOSPITAL ENCOUNTER (OUTPATIENT)
Dept: MAMMOGRAPHY | Facility: CLINIC | Age: 43
Discharge: HOME | End: 2023-08-17
Payer: COMMERCIAL

## 2023-08-17 ENCOUNTER — APPOINTMENT (OUTPATIENT)
Dept: LAB | Facility: CLINIC | Age: 43
End: 2023-08-17
Payer: COMMERCIAL

## 2023-08-17 VITALS — HEIGHT: 62 IN | BODY MASS INDEX: 28.32 KG/M2 | WEIGHT: 153.88 LBS

## 2023-08-17 DIAGNOSIS — E03.9 HYPOTHYROIDISM, UNSPECIFIED TYPE: ICD-10-CM

## 2023-08-17 DIAGNOSIS — Z12.31 SCREENING MAMMOGRAM FOR BREAST CANCER: ICD-10-CM

## 2023-08-17 DIAGNOSIS — I10 HYPERTENSION, ESSENTIAL: ICD-10-CM

## 2023-08-17 LAB
ALBUMIN SERPL BCP-MCNC: 4 G/DL (ref 3.5–5)
ALP SERPL-CCNC: 57 U/L (ref 46–116)
ALT SERPL W P-5'-P-CCNC: 25 U/L (ref 12–78)
ANION GAP SERPL CALCULATED.3IONS-SCNC: 4 MMOL/L
AST SERPL W P-5'-P-CCNC: 13 U/L (ref 5–45)
BILIRUB SERPL-MCNC: 0.51 MG/DL (ref 0.2–1)
BUN SERPL-MCNC: 17 MG/DL (ref 5–25)
CALCIUM SERPL-MCNC: 9.7 MG/DL (ref 8.3–10.1)
CHLORIDE SERPL-SCNC: 108 MMOL/L (ref 96–108)
CHOLEST SERPL-MCNC: 208 MG/DL
CO2 SERPL-SCNC: 29 MMOL/L (ref 21–32)
CREAT SERPL-MCNC: 0.75 MG/DL (ref 0.6–1.3)
ERYTHROCYTE [DISTWIDTH] IN BLOOD BY AUTOMATED COUNT: 12.1 % (ref 11.6–15.1)
GFR SERPL CREATININE-BSD FRML MDRD: 97 ML/MIN/1.73SQ M
GLUCOSE P FAST SERPL-MCNC: 89 MG/DL (ref 65–99)
HCT VFR BLD AUTO: 46.6 % (ref 34.8–46.1)
HDLC SERPL-MCNC: 61 MG/DL
HGB BLD-MCNC: 15.1 G/DL (ref 11.5–15.4)
LDLC SERPL CALC-MCNC: 122 MG/DL (ref 0–100)
MCH RBC QN AUTO: 30.7 PG (ref 26.8–34.3)
MCHC RBC AUTO-ENTMCNC: 32.4 G/DL (ref 31.4–37.4)
MCV RBC AUTO: 95 FL (ref 82–98)
PLATELET # BLD AUTO: 269 THOUSANDS/UL (ref 149–390)
PMV BLD AUTO: 10.7 FL (ref 8.9–12.7)
POTASSIUM SERPL-SCNC: 3.8 MMOL/L (ref 3.5–5.3)
PROT SERPL-MCNC: 8.1 G/DL (ref 6.4–8.4)
RBC # BLD AUTO: 4.92 MILLION/UL (ref 3.81–5.12)
SODIUM SERPL-SCNC: 141 MMOL/L (ref 135–147)
T4 FREE SERPL-MCNC: 0.67 NG/DL (ref 0.61–1.12)
TRIGL SERPL-MCNC: 123 MG/DL
TSH SERPL DL<=0.05 MIU/L-ACNC: 6.68 UIU/ML (ref 0.45–4.5)
WBC # BLD AUTO: 4.97 THOUSAND/UL (ref 4.31–10.16)

## 2023-08-17 PROCEDURE — 85027 COMPLETE CBC AUTOMATED: CPT

## 2023-08-17 PROCEDURE — 84443 ASSAY THYROID STIM HORMONE: CPT

## 2023-08-17 PROCEDURE — 77063 BREAST TOMOSYNTHESIS BI: CPT

## 2023-08-17 PROCEDURE — 84439 ASSAY OF FREE THYROXINE: CPT

## 2023-08-17 PROCEDURE — 36415 COLL VENOUS BLD VENIPUNCTURE: CPT

## 2023-08-17 PROCEDURE — 77067 SCR MAMMO BI INCL CAD: CPT

## 2023-08-17 PROCEDURE — 80053 COMPREHEN METABOLIC PANEL: CPT

## 2023-08-17 PROCEDURE — 80061 LIPID PANEL: CPT

## 2023-08-18 ENCOUNTER — CLINICAL SUPPORT (OUTPATIENT)
Dept: FAMILY MEDICINE CLINIC | Facility: HOSPITAL | Age: 43
End: 2023-08-18

## 2023-08-18 DIAGNOSIS — Z11.1 ENCOUNTER FOR PPD SKIN TEST READING: Primary | ICD-10-CM

## 2023-08-18 LAB
INDURATION: 0 MM
TB SKIN TEST: NEGATIVE

## 2023-08-18 PROCEDURE — 99024 POSTOP FOLLOW-UP VISIT: CPT

## 2023-08-23 DIAGNOSIS — E03.9 HYPOTHYROIDISM, UNSPECIFIED TYPE: ICD-10-CM

## 2023-08-23 RX ORDER — LEVOTHYROXINE SODIUM 0.05 MG/1
50 TABLET ORAL DAILY
Qty: 30 TABLET | Refills: 2 | Status: SHIPPED | OUTPATIENT
Start: 2023-08-23

## 2023-10-23 DIAGNOSIS — E03.9 HYPOTHYROIDISM, UNSPECIFIED TYPE: Primary | ICD-10-CM

## 2023-10-29 LAB — TSH SERPL DL<=0.005 MIU/L-ACNC: 3.64 UIU/ML (ref 0.45–4.5)

## 2023-11-05 DIAGNOSIS — I10 BENIGN ESSENTIAL HTN: ICD-10-CM

## 2023-11-05 RX ORDER — OLMESARTAN MEDOXOMIL AND HYDROCHLOROTHIAZIDE 20/12.5 20; 12.5 MG/1; MG/1
TABLET ORAL
Qty: 90 TABLET | Refills: 0 | Status: SHIPPED | OUTPATIENT
Start: 2023-11-05

## 2023-11-09 DIAGNOSIS — E03.9 HYPOTHYROIDISM, UNSPECIFIED TYPE: ICD-10-CM

## 2023-11-09 RX ORDER — LEVOTHYROXINE SODIUM 0.05 MG/1
50 TABLET ORAL DAILY
Qty: 30 TABLET | Refills: 0 | Status: SHIPPED | OUTPATIENT
Start: 2023-11-09

## 2023-11-30 DIAGNOSIS — E03.9 HYPOTHYROIDISM, UNSPECIFIED TYPE: ICD-10-CM

## 2023-11-30 RX ORDER — LEVOTHYROXINE SODIUM 0.05 MG/1
50 TABLET ORAL DAILY
Qty: 30 TABLET | Refills: 0 | Status: SHIPPED | OUTPATIENT
Start: 2023-11-30

## 2023-12-31 DIAGNOSIS — E03.9 HYPOTHYROIDISM, UNSPECIFIED TYPE: ICD-10-CM

## 2023-12-31 RX ORDER — LEVOTHYROXINE SODIUM 0.05 MG/1
50 TABLET ORAL DAILY
Qty: 30 TABLET | Refills: 0 | Status: SHIPPED | OUTPATIENT
Start: 2023-12-31

## 2024-01-28 DIAGNOSIS — I10 BENIGN ESSENTIAL HTN: ICD-10-CM

## 2024-01-28 DIAGNOSIS — E03.9 HYPOTHYROIDISM, UNSPECIFIED TYPE: ICD-10-CM

## 2024-01-28 RX ORDER — LEVOTHYROXINE SODIUM 0.05 MG/1
50 TABLET ORAL DAILY
Qty: 30 TABLET | Refills: 0 | Status: SHIPPED | OUTPATIENT
Start: 2024-01-28

## 2024-01-28 RX ORDER — OLMESARTAN MEDOXOMIL AND HYDROCHLOROTHIAZIDE 20/12.5 20; 12.5 MG/1; MG/1
TABLET ORAL
Qty: 90 TABLET | Refills: 0 | Status: SHIPPED | OUTPATIENT
Start: 2024-01-28

## 2024-02-27 DIAGNOSIS — E03.9 HYPOTHYROIDISM, UNSPECIFIED TYPE: ICD-10-CM

## 2024-02-28 RX ORDER — LEVOTHYROXINE SODIUM 0.05 MG/1
50 TABLET ORAL DAILY
Qty: 30 TABLET | Refills: 0 | Status: SHIPPED | OUTPATIENT
Start: 2024-02-28

## 2024-03-24 DIAGNOSIS — E03.9 HYPOTHYROIDISM, UNSPECIFIED TYPE: ICD-10-CM

## 2024-03-24 RX ORDER — LEVOTHYROXINE SODIUM 0.05 MG/1
50 TABLET ORAL DAILY
Qty: 30 TABLET | Refills: 0 | Status: SHIPPED | OUTPATIENT
Start: 2024-03-24

## 2024-03-26 ENCOUNTER — TELEPHONE (OUTPATIENT)
Dept: FAMILY MEDICINE CLINIC | Facility: HOSPITAL | Age: 44
End: 2024-03-26

## 2024-03-26 NOTE — TELEPHONE ENCOUNTER
Left msg requesting details on symptoms and what is meant by the message that she has pink eye again. We have to recent record of her having been treated for pink eye. See msgs below

## 2024-03-26 NOTE — TELEPHONE ENCOUNTER
"I have not treated her for pink eye - what does she mean by \"again\"? Has she had pink eye recently? Please get details on her symptoms....  "

## 2024-03-26 NOTE — TELEPHONE ENCOUNTER
PATIENT HAS PINK EYE AGAIN    ASKING FOR DROPS TO BE CALLED IN FOR HER    OFFERED APPT - PT DECLINED    PCB

## 2024-03-27 DIAGNOSIS — H10.30 ACUTE CONJUNCTIVITIS, UNSPECIFIED ACUTE CONJUNCTIVITIS TYPE, UNSPECIFIED LATERALITY: Primary | ICD-10-CM

## 2024-03-27 RX ORDER — POLYMYXIN B SULFATE AND TRIMETHOPRIM 1; 10000 MG/ML; [USP'U]/ML
1 SOLUTION OPHTHALMIC EVERY 4 HOURS
Qty: 10 ML | Refills: 0 | Status: SHIPPED | OUTPATIENT
Start: 2024-03-27 | End: 2024-04-03

## 2024-04-19 DIAGNOSIS — E03.9 HYPOTHYROIDISM, UNSPECIFIED TYPE: ICD-10-CM

## 2024-04-19 RX ORDER — LEVOTHYROXINE SODIUM 0.05 MG/1
50 TABLET ORAL DAILY
Qty: 30 TABLET | Refills: 0 | Status: SHIPPED | OUTPATIENT
Start: 2024-04-19

## 2024-04-28 DIAGNOSIS — I10 BENIGN ESSENTIAL HTN: ICD-10-CM

## 2024-04-28 RX ORDER — OLMESARTAN MEDOXOMIL AND HYDROCHLOROTHIAZIDE 20/12.5 20; 12.5 MG/1; MG/1
TABLET ORAL
Qty: 90 TABLET | Refills: 1 | Status: SHIPPED | OUTPATIENT
Start: 2024-04-28

## 2024-05-16 DIAGNOSIS — E03.9 HYPOTHYROIDISM, UNSPECIFIED TYPE: ICD-10-CM

## 2024-05-16 RX ORDER — LEVOTHYROXINE SODIUM 0.05 MG/1
50 TABLET ORAL DAILY
Qty: 30 TABLET | Refills: 5 | Status: SHIPPED | OUTPATIENT
Start: 2024-05-16

## 2024-05-28 ENCOUNTER — TELEPHONE (OUTPATIENT)
Age: 44
End: 2024-05-28

## 2024-05-28 NOTE — TELEPHONE ENCOUNTER
Pt reports she had a hysterectomy and has been on premarin. States she believes she needs a new dosage or new medication because she has been having menopausal symptoms for the last 1-2 weeks. She has been having hot flashes and lightheaded/dizziness. States she experiences these symptoms about twice a day. Lasts about one minute long. Currently does not have health insurance but is hoping to have it by June 1st.

## 2024-05-29 DIAGNOSIS — E89.40 SURGICAL MENOPAUSE: Primary | ICD-10-CM

## 2024-08-17 ENCOUNTER — HOSPITAL ENCOUNTER (OUTPATIENT)
Dept: MAMMOGRAPHY | Facility: CLINIC | Age: 44
Discharge: HOME | End: 2024-08-17
Payer: COMMERCIAL

## 2024-08-17 VITALS — WEIGHT: 153 LBS | HEIGHT: 62 IN | BODY MASS INDEX: 28.16 KG/M2

## 2024-08-17 DIAGNOSIS — Z12.31 ENCOUNTER FOR SCREENING MAMMOGRAM FOR MALIGNANT NEOPLASM OF BREAST: ICD-10-CM

## 2024-08-17 PROCEDURE — 77063 BREAST TOMOSYNTHESIS BI: CPT

## 2024-08-17 PROCEDURE — 77067 SCR MAMMO BI INCL CAD: CPT

## 2024-08-20 DIAGNOSIS — E89.40 SURGICAL MENOPAUSE: ICD-10-CM

## 2024-09-25 ENCOUNTER — APPOINTMENT (OUTPATIENT)
Dept: LAB | Facility: HOSPITAL | Age: 44
End: 2024-09-25
Payer: COMMERCIAL

## 2024-09-25 ENCOUNTER — TELEPHONE (OUTPATIENT)
Dept: FAMILY MEDICINE CLINIC | Facility: HOSPITAL | Age: 44
End: 2024-09-25

## 2024-09-25 ENCOUNTER — OFFICE VISIT (OUTPATIENT)
Dept: FAMILY MEDICINE CLINIC | Facility: HOSPITAL | Age: 44
End: 2024-09-25
Payer: COMMERCIAL

## 2024-09-25 VITALS
HEART RATE: 91 BPM | BODY MASS INDEX: 28.63 KG/M2 | HEIGHT: 62 IN | OXYGEN SATURATION: 99 % | TEMPERATURE: 97.4 F | SYSTOLIC BLOOD PRESSURE: 108 MMHG | WEIGHT: 155.6 LBS | DIASTOLIC BLOOD PRESSURE: 78 MMHG

## 2024-09-25 DIAGNOSIS — K52.9 ACUTE GASTROENTERITIS: Primary | ICD-10-CM

## 2024-09-25 DIAGNOSIS — I10 BENIGN ESSENTIAL HTN: ICD-10-CM

## 2024-09-25 DIAGNOSIS — K52.9 ACUTE GASTROENTERITIS: ICD-10-CM

## 2024-09-25 LAB
ALBUMIN SERPL BCG-MCNC: 4.3 G/DL (ref 3.5–5)
ALP SERPL-CCNC: 47 U/L (ref 34–104)
ALT SERPL W P-5'-P-CCNC: 22 U/L (ref 7–52)
ANION GAP SERPL CALCULATED.3IONS-SCNC: 6 MMOL/L (ref 4–13)
AST SERPL W P-5'-P-CCNC: 20 U/L (ref 13–39)
BILIRUB SERPL-MCNC: 0.35 MG/DL (ref 0.2–1)
BUN SERPL-MCNC: 11 MG/DL (ref 5–25)
CALCIUM SERPL-MCNC: 9.4 MG/DL (ref 8.4–10.2)
CHLORIDE SERPL-SCNC: 101 MMOL/L (ref 96–108)
CO2 SERPL-SCNC: 29 MMOL/L (ref 21–32)
CREAT SERPL-MCNC: 0.68 MG/DL (ref 0.6–1.3)
ERYTHROCYTE [DISTWIDTH] IN BLOOD BY AUTOMATED COUNT: 11.6 % (ref 11.6–15.1)
GFR SERPL CREATININE-BSD FRML MDRD: 105 ML/MIN/1.73SQ M
GLUCOSE SERPL-MCNC: 87 MG/DL (ref 65–140)
HCT VFR BLD AUTO: 45.5 % (ref 34.8–46.1)
HGB BLD-MCNC: 16 G/DL (ref 11.5–15.4)
MAGNESIUM SERPL-MCNC: 1.8 MG/DL (ref 1.9–2.7)
MCH RBC QN AUTO: 31.8 PG (ref 26.8–34.3)
MCHC RBC AUTO-ENTMCNC: 35.2 G/DL (ref 31.4–37.4)
MCV RBC AUTO: 91 FL (ref 82–98)
PLATELET # BLD AUTO: 214 THOUSANDS/UL (ref 149–390)
PMV BLD AUTO: 9.7 FL (ref 8.9–12.7)
POTASSIUM SERPL-SCNC: 3.6 MMOL/L (ref 3.5–5.3)
PROT SERPL-MCNC: 7.1 G/DL (ref 6.4–8.4)
RBC # BLD AUTO: 5.03 MILLION/UL (ref 3.81–5.12)
SODIUM SERPL-SCNC: 136 MMOL/L (ref 135–147)
WBC # BLD AUTO: 3.84 THOUSAND/UL (ref 4.31–10.16)

## 2024-09-25 PROCEDURE — 80053 COMPREHEN METABOLIC PANEL: CPT

## 2024-09-25 PROCEDURE — 85027 COMPLETE CBC AUTOMATED: CPT

## 2024-09-25 PROCEDURE — 99213 OFFICE O/P EST LOW 20 MIN: CPT | Performed by: FAMILY MEDICINE

## 2024-09-25 PROCEDURE — 83735 ASSAY OF MAGNESIUM: CPT

## 2024-09-25 PROCEDURE — 36415 COLL VENOUS BLD VENIPUNCTURE: CPT

## 2024-09-25 NOTE — TELEPHONE ENCOUNTER
----- Message from Cameron Frazier MD sent at 9/25/2024  2:43 PM EDT -----  Please call.    Hemoglobin is slightly elevated which may indicate a mild dehydration.  Her electrolytes are normal except for slightly low magnesium.  To continue with Pedialyte/Gatorade light which does have electrolytes.  And increase fluids.  Do not take magnesium oxide.  This may cause more diarrhea.  Can consider using Slow-Mag 1 tablet once a day.  Otherwise monitor symptoms.  On the complete blood count her WBC is slightly low which is likely due to a viral illness as well.

## 2024-09-25 NOTE — PROGRESS NOTES
"Ambulatory Visit  Name: Анна Rhodes      : 1980      MRN: [id number]  Encounter Provider: Cameron Frazier MD  Encounter Date: 2024   Encounter department: [facility] C[facility] [address]     Assessment & Plan  Acute gastroenteritis  With some mild lightheadedness and dizziness.  Secondary to dehydration.  Check labs make sure electrolytes are normal.  Check for acute kidney injury.    Advise no work (works at a ) for at least 24 hours without episode of diarrhea.    Discussed diet such as brat diet.  Increase as tolerated.    Increase fluids consider use of Pedialyte or other oral hydration fluids.  Orders:    Comprehensive metabolic panel; Future    Magnesium; Future    CBC; Future    CBC    Benign essential HTN  Monitor.  May need to hold blood pressure medications for a few days.          History of Present Illness     Анна is here for 5 days of diarrhea.  With some abdominal cramping.  No fever no chills.  Multiple people at work have had similar symptoms.  However there is have lasted for 1 to 2 days.  No blood in the stool.  She did have some lightheadedness dizziness yesterday.  She did increase her fluids yesterday felt somewhat better.  She woke up this morning with some fogginess.  Urination has been normal.    Diarrhea   Pertinent negatives include no fever.   Dizziness  Pertinent negatives include no fatigue or fever.         Review of Systems   Constitutional:  Negative for activity change, appetite change, fatigue, fever and unexpected weight change.   Gastrointestinal:  Positive for diarrhea.   Neurological:  Positive for dizziness.           Objective     /78   Pulse 91   Temp (!) 97.4 °F (36.3 °C)   Ht 5' 2\" (1.575 m)   Wt 70.6 kg (155 lb 9.6 oz)   LMP 2021   SpO2 99%   BMI 28.46 kg/m²     Physical Exam  Vitals and nursing note reviewed.   Constitutional:       General: She is not in acute distress.     Appearance: Normal appearance. " She is not ill-appearing, toxic-appearing or diaphoretic.   HENT:      Head: Normocephalic.      Nose: Nose normal.   Eyes:      Extraocular Movements: Extraocular movements intact.      Pupils: Pupils are equal, round, and reactive to light.   Cardiovascular:      Rate and Rhythm: Normal rate and regular rhythm.      Pulses: Normal pulses.      Heart sounds: Normal heart sounds.   Pulmonary:      Effort: Pulmonary effort is normal.      Breath sounds: Normal breath sounds.   Abdominal:      General: Bowel sounds are normal.      Palpations: Abdomen is soft.   Musculoskeletal:      Cervical back: Normal range of motion and neck supple.   Skin:     Capillary Refill: Capillary refill takes less than 2 seconds.   Neurological:      General: No focal deficit present.      Mental Status: She is alert and oriented to person, place, and time.   Psychiatric:         Mood and Affect: Mood normal.         Behavior: Behavior normal.

## 2024-10-16 ENCOUNTER — OFFICE VISIT (OUTPATIENT)
Dept: FAMILY MEDICINE CLINIC | Facility: HOSPITAL | Age: 44
End: 2024-10-16
Payer: COMMERCIAL

## 2024-10-16 VITALS
HEART RATE: 103 BPM | BODY MASS INDEX: 29.19 KG/M2 | HEIGHT: 62 IN | SYSTOLIC BLOOD PRESSURE: 130 MMHG | OXYGEN SATURATION: 98 % | TEMPERATURE: 98.3 F | DIASTOLIC BLOOD PRESSURE: 80 MMHG | WEIGHT: 158.6 LBS

## 2024-10-16 DIAGNOSIS — H91.93 BILATERAL HEARING LOSS, UNSPECIFIED HEARING LOSS TYPE: ICD-10-CM

## 2024-10-16 DIAGNOSIS — Z11.1 SCREENING-PULMONARY TB: ICD-10-CM

## 2024-10-16 DIAGNOSIS — Z23 ENCOUNTER FOR IMMUNIZATION: ICD-10-CM

## 2024-10-16 DIAGNOSIS — Z90.710 S/P LAPAROSCOPIC HYSTERECTOMY: ICD-10-CM

## 2024-10-16 DIAGNOSIS — Z11.59 NEED FOR HEPATITIS B SCREENING TEST: ICD-10-CM

## 2024-10-16 DIAGNOSIS — Z01.84 IMMUNITY STATUS TESTING: ICD-10-CM

## 2024-10-16 DIAGNOSIS — E03.9 HYPOTHYROIDISM, UNSPECIFIED TYPE: ICD-10-CM

## 2024-10-16 DIAGNOSIS — Z11.4 SCREENING FOR HIV (HUMAN IMMUNODEFICIENCY VIRUS): ICD-10-CM

## 2024-10-16 DIAGNOSIS — I10 BENIGN ESSENTIAL HTN: Primary | ICD-10-CM

## 2024-10-16 DIAGNOSIS — Z11.59 NEED FOR HEPATITIS C SCREENING TEST: ICD-10-CM

## 2024-10-16 DIAGNOSIS — Z00.00 ANNUAL PHYSICAL EXAM: ICD-10-CM

## 2024-10-16 PROCEDURE — 90471 IMMUNIZATION ADMIN: CPT

## 2024-10-16 PROCEDURE — 90656 IIV3 VACC NO PRSV 0.5 ML IM: CPT

## 2024-10-16 PROCEDURE — 99396 PREV VISIT EST AGE 40-64: CPT | Performed by: FAMILY MEDICINE

## 2024-10-18 NOTE — PATIENT INSTRUCTIONS
"Patient Education     Routine physical for adults   The Basics   Written by the doctors and editors at [facility]   What is a physical? -- A physical is a routine visit, or \"check-up,\" with your doctor. You might also hear it called a \"wellness visit\" or \"preventive visit.\"  During each visit, the doctor will:   Ask about your physical and mental health   Ask about your habits, behaviors, and lifestyle   Do an exam   Give you vaccines if needed   Talk to you about any medicines you take   Give advice about your health   Answer your questions  Getting regular check-ups is an important part of taking care of your health. It can help your doctor find and treat any problems you have. But it's also important for preventing health problems.  A routine physical is different from a \"sick visit.\" A sick visit is when you see a doctor because of a health concern or problem. Since physicals are scheduled ahead of time, you can think about what you want to ask the doctor.  How often should I get a physical? -- It depends on your age and health. In general, for people age 21 years and older:   If you are younger than 50 years, you might be able to get a physical every 3 years.   If you are 50 years or older, your doctor might recommend a physical every year.  If you have an ongoing health condition, like diabetes or high blood pressure, your doctor will probably want to see you more often.  What happens during a physical? -- In general, each visit will include:   Physical exam - The doctor or nurse will check your height, weight, heart rate, and blood pressure. They will also look at your eyes and ears. They will ask about how you are feeling and whether you have any symptoms that bother you.   Medicines - It's a good idea to bring a list of all the medicines you take to each doctor visit. Your doctor will talk to you about your medicines and answer any questions. Tell them if you are having any side effects that bother you. You " "should also tell them if you are having trouble paying for any of your medicines.   Habits and behaviors - This includes:   Your diet   Your exercise habits   Whether you smoke, drink alcohol, or use drugs   Whether you are sexually active   Whether you feel safe at home  Your doctor will talk to you about things you can do to improve your health and lower your risk of health problems. They will also offer help and support. For example, if you want to quit smoking, they can give you advice and might prescribe medicines. If you want to improve your diet or get more physical activity, they can help you with this, too.   Lab tests, if needed - The tests you get will depend on your age and situation. For example, your doctor might want to check your:   Cholesterol   Blood sugar   Iron level   Vaccines - The recommended vaccines will depend on your age, health, and what vaccines you already had. Vaccines are very important because they can prevent certain serious or deadly infections.   Discussion of screening - \"Screening\" means checking for diseases or other health problems before they cause symptoms. Your doctor can recommend screening based on your age, risk, and preferences. This might include tests to check for:   Cancer, such as breast, prostate, cervical, ovarian, colorectal, prostate, lung, or skin cancer   Sexually transmitted infections, such as chlamydia and gonorrhea   Mental health conditions like depression and anxiety  Your doctor will talk to you about the different types of screening tests. They can help you decide which screenings to have. They can also explain what the results might mean.   Answering questions - The physical is a good time to ask the doctor or nurse questions about your health. If needed, they can refer you to other doctors or specialists, too.  Adults older than 65 years often need other care, too. As you get older, your doctor will talk to you about:   How to prevent falling at " home   Hearing or vision tests   Memory testing   How to take your medicines safely   Making sure that you have the help and support you need at home  All topics are updated as new evidence becomes available and our peer review process is complete.  This topic retrieved from [employer] on: May 02, 2024.  Topic 694132 Version 1.0  Release: 32.4.3 - C32.122  © 2024 UpToDate, Inc. and/or its affiliates. All rights reserved.  Consumer Information Use and Disclaimer   Disclaimer: This generalized information is a limited summary of diagnosis, treatment, and/or medication information. It is not meant to be comprehensive and should be used as a tool to help the user understand and/or assess potential diagnostic and treatment options. It does NOT include all information about conditions, treatments, medications, side effects, or risks that may apply to a specific patient. It is not intended to be medical advice or a substitute for the medical advice, diagnosis, or treatment of a health care provider based on the health care provider's examination and assessment of a patient's specific and unique circumstances. Patients must speak with a health care provider for complete information about their health, medical questions, and treatment options, including any risks or benefits regarding use of medications. This information does not endorse any treatments or medications as safe, effective, or approved for treating a specific patient. UpToDate, Inc. and its affiliates disclaim any warranty or liability relating to this information or the use thereof.The use of this information is governed by the Terms of Use, available at https://www.[employer].com/en/know/clinical-effectiveness-terms. 2024© UpToDate, Inc. and its affiliates and/or licensors. All rights reserved.  Copyright   © 2024 UpToDate, Inc. and/or its affiliates. All rights reserved.

## 2024-10-18 NOTE — PROGRESS NOTES
Adult Annual Physical  Name: Анна Rhodes      : 1980      MRN: [id number]  Encounter Provider: Cameron Frazier MD  Encounter Date: 10/16/2024   Encounter department: [facility] [address]     Assessment & Plan  Benign essential HTN    Orders:    CBC; Future    Comprehensive metabolic panel; Future    CBC    Comprehensive metabolic panel    Lipid Panel with Direct LDL reflex; Future    TSH, 3rd generation with Free T4 reflex; Future    Lipid Panel with Direct LDL reflex    TSH, 3rd generation with Free T4 reflex      Hypothyroidism, unspecified type           S/P laparoscopic hysterectomy           Screening-pulmonary TB    Orders:    QuantiFERON-TB Gold Plus [facility]; Future    QuantiFERON-TB Gold Plus [facility]      Immunity status testing    Orders:    Measles/Mumps/Rubella Immunity; Future    Varicella zoster antibody, IgG; Future    Hepatitis C antibody; Future    Hepatitis B core antibody, total; Future    Hepatitis B surface antigen; Future    Hepatitis A antibody, IgM; Future    Measles/Mumps/Rubella Immunity    Varicella zoster antibody, IgG    Hepatitis C antibody    Hepatitis B core antibody, total    Hepatitis B surface antigen    Hepatitis A antibody, IgM      Need for hepatitis B screening test           Need for hepatitis C screening test    Orders:    Hepatitis C antibody; Future    Hepatitis C antibody      Bilateral hearing loss, unspecified hearing loss type    Orders:    Ambulatory Referral to Audiology; Future      Screening for HIV (human immunodeficiency virus)    Orders:    HIV 1/2 AG/AB W REFLEX [facility] only; Future    HIV 1/2 AG/AB W REFLEX [facility] only      Encounter for immunization    Orders:    influenza vaccine preservative-free 0.5 mL IM (Fluzone, Afluria, Fluarix, Flulaval)      Annual physical exam           Immunizations and preventive care screenings were discussed with patient today. Appropriate education was printed on  patient's after visit summary.    Counseling:  Alcohol/drug use: discussed moderation in alcohol intake, the recommendations for healthy alcohol use, and avoidance of illicit drug use.  Dental Health: discussed importance of regular tooth brushing, flossing, and dental visits.  Exercise: the importance of regular exercise/physical activity was discussed. Recommend exercise 3-5 times per week for at least 30 minutes.          History of Present Illness     Adult Annual Physical:  Patient presents for annual physical.     Diet and Physical Activity:  - Diet/Nutrition: well balanced diet.  - Exercise: moderate cardiovascular exercise.    Depression Screening:  - PHQ-2 Score: 0    General Health:  - Sleep: sleeps well.  - Hearing: normal hearing right ear and normal hearing left ear.  - Vision: no vision problems.  - Dental: regular dental visits.    /GYN Health:    - Contraception:. Status post history      Review of Systems   Constitutional: Negative.  Negative for activity change, appetite change, chills, diaphoresis, fatigue and fever.   HENT:  Negative for congestion, facial swelling and sore throat.    Respiratory: Negative.  Negative for apnea, cough, chest tightness and shortness of breath.    Cardiovascular: Negative.  Negative for chest pain and palpitations.   Gastrointestinal: Negative.  Negative for abdominal distention, abdominal pain, blood in stool, constipation, diarrhea and nausea.   Genitourinary: Negative.  Negative for difficulty urinating, dysuria, flank pain and frequency.     Current Outpatient Medications on File Prior to Visit   Medication Sig Dispense Refill    conjugated estrogens (Premarin) 0.625 mg tablet Take 1 tablet (0.625 mg total) by mouth daily 90 tablet 3    levothyroxine 50 mcg tablet Take 1 tablet by mouth once daily 30 tablet 5    Multiple Vitamins-Minerals (multivitamin with minerals) tablet Take 1 tablet by mouth daily      olmesartan-hydrochlorothiazide (BENICAR HCT) 20-12.5  "MG per tablet Take 1 tablet by mouth once daily 90 tablet 1    fluticasone (FLONASE) 50 mcg/act nasal spray 1 spray into each nostril daily       No current facility-administered medications on file prior to visit.      Social History     Tobacco Use    Smoking status: Never    Smokeless tobacco: Never   Vaping Use    Vaping status: Never Used   Substance and Sexual Activity    Alcohol use: Not Currently     Alcohol/week: 1.0 standard drink of alcohol     Types: 1 Standard drinks or equivalent per week     Comment: Very Occasionally - not even once a month    Drug use: Never    Sexual activity: Yes     Partners: Male     Birth control/protection: Male Sterilization, Female Sterilization       Objective     /80   Pulse 103   Temp 98.3 °F (36.8 °C)   Ht 5' 2\" (1.575 m)   Wt 71.9 kg (158 lb 9.6 oz)   LMP 07/09/2021   SpO2 98%   BMI 29.01 kg/m²     Physical Exam  Vitals and nursing note reviewed.   Constitutional:       General: She is not in acute distress.     Appearance: Normal appearance. She is well-developed.   HENT:      Head: Normocephalic and atraumatic.      Right Ear: External ear normal.      Left Ear: External ear normal.      Nose: Nose normal.      Mouth/Throat:      Mouth: Mucous membranes are moist.   Eyes:      Conjunctiva/sclera: Conjunctivae normal.      Pupils: Pupils are equal, round, and reactive to light.   Cardiovascular:      Rate and Rhythm: Normal rate and regular rhythm.      Heart sounds: Normal heart sounds.   Pulmonary:      Effort: Pulmonary effort is normal.      Breath sounds: Normal breath sounds.   Abdominal:      General: Bowel sounds are normal.      Palpations: Abdomen is soft.   Musculoskeletal:      Cervical back: Normal range of motion and neck supple.   Skin:     General: Skin is warm and dry.   Neurological:      General: No focal deficit present.      Mental Status: She is alert and oriented to person, place, and time.   Psychiatric:         Mood and Affect: " Mood normal.         Behavior: Behavior normal.

## 2024-10-18 NOTE — ASSESSMENT & PLAN NOTE
Orders:    CBC; Future    Comprehensive metabolic panel; Future    CBC    Comprehensive metabolic panel    Lipid Panel with Direct LDL reflex; Future    TSH, 3rd generation with Free T4 reflex; Future    Lipid Panel with Direct LDL reflex    TSH, 3rd generation with Free T4 reflex

## 2024-10-23 ENCOUNTER — TELEPHONE (OUTPATIENT)
Dept: FAMILY MEDICINE CLINIC | Facility: HOSPITAL | Age: 44
End: 2024-10-23

## 2024-10-23 DIAGNOSIS — I10 BENIGN ESSENTIAL HTN: ICD-10-CM

## 2024-10-23 LAB
CHOLEST SERPL-MCNC: 203 MG/DL (ref 100–199)
HAV IGM SERPL QL IA: NEGATIVE
HBV CORE AB SERPL QL IA: NEGATIVE
HBV SURFACE AG SERPL QL IA: NEGATIVE
HCV AB S/CO SERPL IA: NON REACTIVE
HDLC SERPL-MCNC: 57 MG/DL
HIV 1+2 AB+HIV1 P24 AG SERPL QL IA: NON REACTIVE
LDLC SERPL CALC-MCNC: 116 MG/DL (ref 0–99)
LDLC/HDLC SERPL: 2 RATIO (ref 0–3.2)
MEV IGG SER IA-ACNC: <13.5 AU/ML
MUV IGG SER IA-ACNC: 78.3 AU/ML
RUBV IGG SERPL IA-ACNC: 4.23 INDEX
SL AMB T4, FREE (DIRECT): 1.18 NG/DL (ref 0.82–1.77)
SL AMB VLDL CHOLESTEROL CALC: 30 MG/DL (ref 5–40)
TRIGL SERPL-MCNC: 169 MG/DL (ref 0–149)
TSH SERPL DL<=0.005 MIU/L-ACNC: 4.84 UIU/ML (ref 0.45–4.5)
VZV IGG SER IA-ACNC: REACTIVE

## 2024-10-23 RX ORDER — OLMESARTAN MEDOXOMIL AND HYDROCHLOROTHIAZIDE 20/12.5 20; 12.5 MG/1; MG/1
TABLET ORAL
Qty: 90 TABLET | Refills: 1 | Status: SHIPPED | OUTPATIENT
Start: 2024-10-23

## 2024-10-23 NOTE — TELEPHONE ENCOUNTER
Patient still has a nagging cough - sometimes productive.    Disrupting her sleep.    Can she get a cough syrup?

## 2024-10-24 DIAGNOSIS — R05.9 COUGH, UNSPECIFIED TYPE: Primary | ICD-10-CM

## 2024-10-24 LAB
ALBUMIN SERPL-MCNC: 4.3 G/DL (ref 3.9–4.9)
ALP SERPL-CCNC: 64 IU/L (ref 44–121)
ALT SERPL-CCNC: 18 IU/L (ref 0–32)
AST SERPL-CCNC: 20 IU/L (ref 0–40)
BILIRUB SERPL-MCNC: 0.3 MG/DL (ref 0–1.2)
BUN SERPL-MCNC: 14 MG/DL (ref 6–24)
BUN/CREAT SERPL: 21 (ref 9–23)
CALCIUM SERPL-MCNC: 9.7 MG/DL (ref 8.7–10.2)
CHLORIDE SERPL-SCNC: 102 MMOL/L (ref 96–106)
CO2 SERPL-SCNC: 24 MMOL/L (ref 20–29)
CREAT SERPL-MCNC: 0.67 MG/DL (ref 0.57–1)
EGFR: 110 ML/MIN/1.73
ERYTHROCYTE [DISTWIDTH] IN BLOOD BY AUTOMATED COUNT: 12.1 % (ref 11.7–15.4)
GAMMA INTERFERON BACKGROUND BLD IA-ACNC: 0 IU/ML
GLOBULIN SER-MCNC: 2.6 G/DL (ref 1.5–4.5)
GLUCOSE SERPL-MCNC: 84 MG/DL (ref 70–99)
HCT VFR BLD AUTO: 43.5 % (ref 34–46.6)
HGB BLD-MCNC: 14.3 G/DL (ref 11.1–15.9)
M TB IFN-G CD4+ T-CELLS BLD-ACNC: 0.01 IU/ML
M TB IFN-G CD4+ T-CELLS BLD-ACNC: 0.03 IU/ML
MCH RBC QN AUTO: 30.8 PG (ref 26.6–33)
MCHC RBC AUTO-ENTMCNC: 32.9 G/DL (ref 31.5–35.7)
MCV RBC AUTO: 94 FL (ref 79–97)
MITOGEN IGNF BLD-ACNC: >10 IU/ML
PLATELET # BLD AUTO: 309 X10E3/UL (ref 150–450)
POTASSIUM SERPL-SCNC: 3.7 MMOL/L (ref 3.5–5.2)
PROT SERPL-MCNC: 6.9 G/DL (ref 6–8.5)
QUANTIFERON INCUBATION COMMENT: NORMAL
QUANTIFERON-TB GOLD PLUS: NEGATIVE
RBC # BLD AUTO: 4.64 X10E6/UL (ref 3.77–5.28)
SERVICE CMNT-IMP: NORMAL
SODIUM SERPL-SCNC: 141 MMOL/L (ref 134–144)
WBC # BLD AUTO: 7.2 X10E3/UL (ref 3.4–10.8)

## 2024-10-24 RX ORDER — DEXTROMETHORPHAN HBR. AND GUAIFENESIN 10; 100 MG/5ML; MG/5ML
5 SOLUTION ORAL EVERY 12 HOURS
Qty: 180 ML | Refills: 0 | Status: SHIPPED | OUTPATIENT
Start: 2024-10-24

## 2024-10-30 ENCOUNTER — APPOINTMENT (OUTPATIENT)
Dept: URGENT CARE | Facility: CLINIC | Age: 44
End: 2024-10-30

## 2024-10-30 DIAGNOSIS — Z02.1 PRE-EMPLOYMENT HEALTH SCREENING EXAMINATION: Primary | ICD-10-CM

## 2024-11-18 DIAGNOSIS — E03.9 HYPOTHYROIDISM, UNSPECIFIED TYPE: ICD-10-CM

## 2024-11-19 RX ORDER — LEVOTHYROXINE SODIUM 50 UG/1
50 TABLET ORAL DAILY
Qty: 30 TABLET | Refills: 0 | Status: SHIPPED | OUTPATIENT
Start: 2024-11-19

## 2024-12-17 DIAGNOSIS — E03.9 HYPOTHYROIDISM, UNSPECIFIED TYPE: ICD-10-CM

## 2024-12-18 RX ORDER — LEVOTHYROXINE SODIUM 50 UG/1
50 TABLET ORAL DAILY
Qty: 30 TABLET | Refills: 5 | Status: SHIPPED | OUTPATIENT
Start: 2024-12-18

## 2025-01-13 DIAGNOSIS — E89.40 SURGICAL MENOPAUSE: ICD-10-CM

## 2025-01-13 NOTE — TELEPHONE ENCOUNTER
Reason for call:   [x] Refill   [] Prior Auth  [x] Other: Pt is completely out.    Office:   [] PCP/Provider -   [x] Specialty/Provider - Shazia Mccormick MD     Medication: (Premarin) 0.625 mg     Dose/Frequency: 1 tabs daily    Quantity: 90 tabs    Pharmacy: [facility] - [place], [place] - [address]      Does the patient have enough for 3 days?   [] Yes   [x] No - Send as HP to POD

## 2025-01-14 ENCOUNTER — TELEPHONE (OUTPATIENT)
Age: 45
End: 2025-01-14

## 2025-01-14 NOTE — TELEPHONE ENCOUNTER
Called and explained to the patient that annual exams must be in person, patient was understanding and is scheduled for 3/3/25.

## 2025-01-14 NOTE — TELEPHONE ENCOUNTER
Pt is requesting that annual exam be virtual. States that they had a hysterectomy 2021 and yearlys after that procedure didn't include a vaginal exam. Pt has also gotten her yearly mammograms.     Pt works during the practices hours so it's hard to schedule an appt.    Can someone please follow up with pt on virtual appt request.

## 2025-01-15 NOTE — TELEPHONE ENCOUNTER
Patient called to request a refill for their Premarin advised a refill was requested on 1/13/25 and is pending approval. Patient verbalized understanding and is in agreement.       Patient is completely out of medication and the appointment isn't scheduled until 3/7/2025

## 2025-01-20 ENCOUNTER — OFFICE VISIT (OUTPATIENT)
Dept: AUDIOLOGY | Age: 45
End: 2025-01-20
Payer: COMMERCIAL

## 2025-01-20 DIAGNOSIS — H90.3 SENSORY HEARING LOSS, BILATERAL: Primary | ICD-10-CM

## 2025-01-20 DIAGNOSIS — H91.93 BILATERAL HEARING LOSS, UNSPECIFIED HEARING LOSS TYPE: ICD-10-CM

## 2025-01-20 PROCEDURE — 92567 TYMPANOMETRY: CPT

## 2025-01-20 PROCEDURE — 92552 PURE TONE AUDIOMETRY AIR: CPT

## 2025-01-20 PROCEDURE — 92556 SPEECH AUDIOMETRY COMPLETE: CPT

## 2025-01-20 NOTE — PROGRESS NOTES
Diagnostic Hearing Evaluation    Name:  Анна Rhodes  :  1980  Age:  44 y.o.   MRN:  [id number]  Date of Evaluation: 25     HISTORY:     Reason for visit: Difficulty Understanding    Анна Rhodes is being seen today at the request of Dr. Frazier for an initial  evaluation of hearing. The patient reports having difficulty in loud situations. The patient  denies otalgia, otorrhea, fullness, tinnitus, noise exposure, and family history of hearing loss. Patient does experience dizziness but attributes it to blood pressure and hormone supplements.    EVALUATION:    Otoscopic Evaluation:   Right Ear: Unremarkable, canal clear   Left Ear: Unremarkable, canal clear    Tympanometry:   Right Ear: Type A; normal middle ear pressure and static compliance    Left Ear: Type A; normal middle ear pressure and static compliance     Speech Audiometry:  Speech Reception (SRT)    Right Ear: 10 dB HL    Left Ear: 5 dB HL    Word Recognition Scores (WRS):  Right Ear: excellent (100 % correct)     Left Ear: excellent (100 % correct)    Stimuli: [id number]    Pure Tone Audiometry:  Conventional pure tone audiometry from 250 - 8000 Hz  was obtained with good reliability and revealed the following:     Right Ear: Normal hearing sensitivity   Left Ear: Normal hearing sensitivity    *see attached audiogram    RECOMMENDATIONS:  Return to [facility]. for F/U and Copy to Patient/Caregiver    PATIENT EDUCATION:   The results of today's results and recommendations were reviewed with the patient and her hearing thresholds were explained at length. Treatment options, including amplification and communication strategies, were discussed as appropriate. The patient voiced understanding of her test results. Questions were addressed and the patient was encouraged to contact our department should concerns arise.      Genaro Resendiz., [facility]  Clinical Audiologist  [facility]  [address] [place] [address]  [place] [id number]

## 2025-04-07 DIAGNOSIS — I10 BENIGN ESSENTIAL HTN: ICD-10-CM

## 2025-04-08 RX ORDER — OLMESARTAN MEDOXOMIL AND HYDROCHLOROTHIAZIDE 20/12.5 20; 12.5 MG/1; MG/1
1 TABLET ORAL DAILY
Qty: 90 TABLET | Refills: 1 | Status: SHIPPED | OUTPATIENT
Start: 2025-04-08

## 2025-06-09 ENCOUNTER — ANNUAL EXAM (OUTPATIENT)
Age: 45
End: 2025-06-09
Payer: COMMERCIAL

## 2025-06-09 VITALS
WEIGHT: 153.2 LBS | SYSTOLIC BLOOD PRESSURE: 126 MMHG | HEIGHT: 62 IN | DIASTOLIC BLOOD PRESSURE: 88 MMHG | BODY MASS INDEX: 28.19 KG/M2

## 2025-06-09 DIAGNOSIS — Z12.31 ENCOUNTER FOR SCREENING MAMMOGRAM FOR MALIGNANT NEOPLASM OF BREAST: ICD-10-CM

## 2025-06-09 DIAGNOSIS — E89.40 SURGICAL MENOPAUSE: ICD-10-CM

## 2025-06-09 DIAGNOSIS — Z01.419 ENCOUNTER FOR ANNUAL ROUTINE GYNECOLOGICAL EXAMINATION: Primary | ICD-10-CM

## 2025-06-09 PROBLEM — N83.201 OVARIAN CYST, RIGHT: Status: RESOLVED | Noted: 2019-08-05 | Resolved: 2025-06-09

## 2025-06-09 PROBLEM — N80.129 ENDOMETRIOMA: Status: RESOLVED | Noted: 2021-06-18 | Resolved: 2025-06-09

## 2025-06-09 PROCEDURE — 99396 PREV VISIT EST AGE 40-64: CPT | Performed by: OBSTETRICS & GYNECOLOGY

## 2025-06-09 RX ORDER — ESTRADIOL 1 MG/1
1 TABLET ORAL DAILY
Qty: 90 TABLET | Refills: 3 | Status: SHIPPED | OUTPATIENT
Start: 2025-06-09 | End: 2026-06-09

## 2025-06-09 NOTE — PROGRESS NOTES
"Annual Wellness Visit  Name: Анна Rhodes      : 1980      MRN: [id number]  Encounter Provider: Kaitlyn Reynoso MD  Encounter Date: 2025   Encounter department: [facility] [place]    45 y.o.  yo presents today for her annual exam.:  Assessment & Plan  Encounter for annual routine gynecological examination         Encounter for screening mammogram for malignant neoplasm of breast    Orders:    Mammo screening bilateral w 3d and cad; Future    Surgical menopause    Orders:    estradiol (Estrace) 1 mg tablet; Take 1 tablet (1 mg total) by mouth daily    Recommended coconut oil and OTC HTC cream for vulvar dryness and irritation         History of Present Illness     Анна Rhodes is a 45 y.o. female who presents for annual well woman exam. She reports random episodes of dizziness/vertigo and inability to lose weight.  She has been on HRT since TLH/BSO; pays out of pocket.    GYN:  No vaginal discharge or dyspareunia; notes some dryness an irritation at vaginal opening  No VB  Contraception: s/p hysterectomy.  Patient is sexually active with one partner.      OB:   female      :  No dysuria, urinary frequency or urgency.  No hematuria, flank pain, incontinence.  No constipation, diarrhea    Breast:  No breast mass, skin changes, dimpling, reddening, nipple retraction.  No breast discharge.  Patient does not have a family history of breast, endometrial, or ovarian ca.     General:  Diet: Reviewed dietary calcium recommendations  Exercise: Reviewed recommendation of 150 minutes of moderate intensity exercise per week  ETOH use: rare  Tobacco use: no  Recreational drug use: no    Screening:  Last Pap: 19 NILM/HPV neg  Last Mammogram: 24 BI-RADS 1, Neg  Colon: Never  STD screening: declined.    Review of Systems as per HPI       Objective   /88 (BP Location: Right arm, Patient Position: Sitting, Cuff Size: Large)   Ht 5' 1.75\" (1.568 m)   Wt 69.5 kg " (153 lb 3.2 oz)   LMP 07/09/2021   BMI 28.25 kg/m²      Physical Exam  Constitutional:       Appearance: Normal appearance.   Genitourinary:      Vulva and urethral meatus normal.      Right Labia: skin changes.      Right Labia: No rash, tenderness or lesions.     Left Labia: No tenderness, lesions, skin changes or rash.     No labial fusion noted.      Vaginal cuff intact.     No vaginal erythema, bleeding or ulceration.      Moderate vaginal atrophy present.       Right Adnexa: not tender and not full.     Left Adnexa: not tender and not full.     Cervix is absent.      Uterus is absent.      Bladder is not tender.    Breasts:     Breasts are soft.     Right: Normal. No inverted nipple, mass, nipple discharge, skin change or tenderness.      Left: Normal. No inverted nipple, mass, nipple discharge, skin change or tenderness.   HENT:      Head: Normocephalic.     Cardiovascular:      Rate and Rhythm: Normal rate and regular rhythm.   Pulmonary:      Effort: Pulmonary effort is normal.   Abdominal:      General: There is no distension.      Palpations: Abdomen is soft.      Tenderness: There is no abdominal tenderness.     Musculoskeletal:         General: No swelling.   Lymphadenopathy:      Upper Body:      Right upper body: No axillary adenopathy.      Left upper body: No axillary adenopathy.     Neurological:      General: No focal deficit present.      Mental Status: She is alert and oriented to person, place, and time.     Skin:     General: Skin is warm and dry.     Psychiatric:         Mood and Affect: Mood normal.         Behavior: Behavior normal.   Vitals reviewed.

## 2025-06-17 DIAGNOSIS — E03.9 HYPOTHYROIDISM, UNSPECIFIED TYPE: ICD-10-CM

## 2025-06-17 RX ORDER — LEVOTHYROXINE SODIUM 50 UG/1
50 TABLET ORAL DAILY
Qty: 30 TABLET | Refills: 2 | Status: SHIPPED | OUTPATIENT
Start: 2025-06-17

## (undated) DEVICE — PROXIMATE SKIN STAPLERS (35 WIDE) CONTAINS 35 STAINLESS STEEL STAPLES (FIXED HEAD): Brand: PROXIMATE

## (undated) DEVICE — NEEDLE 22 G X 1 1/2 SAFETY

## (undated) DEVICE — ELECTRODE BLADE MOD E-Z CLEAN  2.75IN 7CM -0012AM

## (undated) DEVICE — SPONGE LAP 18 X 18 IN STRL RFD

## (undated) DEVICE — TOOL T12MH25 LEGEND 12CM 2.5MM MH: Brand: MIDAS REX ™

## (undated) DEVICE — DRAPE FLUID WARMER (BIRD BATH)

## (undated) DEVICE — UTERINE MANIPULATOR RUMI 6.7 X 10 CM

## (undated) DEVICE — PVC URETHRAL CATHETER: Brand: DOVER

## (undated) DEVICE — SPONGE SCRUB 4 PCT CHLORHEXIDINE

## (undated) DEVICE — BETHLEHEM UNIVERSAL LAPAROTOMY: Brand: CARDINAL HEALTH

## (undated) DEVICE — 2000CC GUARDIAN II: Brand: GUARDIAN

## (undated) DEVICE — INTENDED FOR TISSUE SEPARATION, AND OTHER PROCEDURES THAT REQUIRE A SHARP SURGICAL BLADE TO PUNCTURE OR CUT.: Brand: BARD-PARKER SAFETY BLADES SIZE 15, STERILE

## (undated) DEVICE — BLUE HEAT SCOPE WARMER

## (undated) DEVICE — HEMOSTATIC MATRIX SURGIFLO 8ML W/THROMBIN

## (undated) DEVICE — SYRINGE 10ML LL

## (undated) DEVICE — GLOVE INDICATOR PI UNDERGLOVE SZ 7.5 BLUE

## (undated) DEVICE — SUT VICRYL 0 CT-1 CR/8 27 IN JJ41G

## (undated) DEVICE — GLOVE INDICATOR PI UNDERGLOVE SZ 6.5 BLUE

## (undated) DEVICE — SNAP KOVER: Brand: UNBRANDED

## (undated) DEVICE — SUT VICRYL 1 CT-1 27 IN J261H

## (undated) DEVICE — Device

## (undated) DEVICE — TUBING SUCTION 5MM X 12 FT

## (undated) DEVICE — LAPAROSCOPIC SMOKE EVAC TUBING

## (undated) DEVICE — BETHLEHEM UNIVERSAL GYN LAP PK: Brand: CARDINAL HEALTH

## (undated) DEVICE — BETHLEHEM UNIVERSAL SPINE, KIT: Brand: CARDINAL HEALTH

## (undated) DEVICE — ADHESIVE SKIN HIGH VISCOSITY EXOFIN 1ML

## (undated) DEVICE — SUT MONOCRYL 4-0 PS-2 27 IN Y426H

## (undated) DEVICE — PENCIL ELECTROSURG E-Z CLEAN -0035H

## (undated) DEVICE — TISSUE RETRIEVAL SYSTEM: Brand: INZII RETRIEVAL SYSTEM

## (undated) DEVICE — TUBING SMOKE EVAC W/FILTRATION DEVICE PLUMEPORT ACTIV

## (undated) DEVICE — [HIGH FLOW INSUFFLATOR,  DO NOT USE IF PACKAGE IS DAMAGED,  KEEP DRY,  KEEP AWAY FROM SUNLIGHT,  PROTECT FROM HEAT AND RADIOACTIVE SOURCES.]: Brand: PNEUMOSURE

## (undated) DEVICE — SUT VICRYL 0 CT-1 36 IN J946H

## (undated) DEVICE — ANTIBACTERIAL VIOLET BRAIDED (POLYGLACTIN 910), SYNTHETIC ABSORBABLE SUTURE: Brand: COATED VICRYL

## (undated) DEVICE — TROCAR: Brand: KII SLEEVE

## (undated) DEVICE — GLOVE INDICATOR PI UNDERGLOVE SZ 7 BLUE

## (undated) DEVICE — SUT MONOCRYL PLUS 3-0 PS-2 27 IN MCP427H

## (undated) DEVICE — INTENDED FOR TISSUE SEPARATION, AND OTHER PROCEDURES THAT REQUIRE A SHARP SURGICAL BLADE TO PUNCTURE OR CUT.: Brand: BARD-PARKER SAFETY BLADES SIZE 11, STERILE

## (undated) DEVICE — EXTRA LARGE, DISPOSABLE C-SECTION PROTECTOR - RETRACTOR: Brand: ALEXIS ® O C-SECTION PROTECTOR - RETRACTOR

## (undated) DEVICE — PLASTIC ADHESIVE BANDAGE: Brand: CURITY

## (undated) DEVICE — OCCLUDER COLPO-PNEUMO

## (undated) DEVICE — PREMIUM DRY TRAY LF: Brand: MEDLINE INDUSTRIES, INC.

## (undated) DEVICE — IRRIG ENDO FLO TUBING

## (undated) DEVICE — DRAPE ADOLESCENT LAPAROTOMY

## (undated) DEVICE — ENSEAL X1 TISSUE SEALER, CURVED JAW, 37 CM SHAFT LENGTH: Brand: ENSEAL

## (undated) DEVICE — ELECTRODE BLADE MOD  E-Z CLEAN 6.5IN -0014M

## (undated) DEVICE — SURGIFLO ENDOSCOPIC APPICATOR: Brand: ETHICON

## (undated) DEVICE — IV EXTENSION TUBING 33 IN

## (undated) DEVICE — INTENDED FOR TISSUE SEPARATION, AND OTHER PROCEDURES THAT REQUIRE A SHARP SURGICAL BLADE TO PUNCTURE OR CUT.: Brand: BARD-PARKER ® CARBON RIB-BACK BLADES

## (undated) DEVICE — MAYO STAND COVER: Brand: CONVERTORS

## (undated) DEVICE — TIBURON TRANSVERSE LAPAROTOMY SHEET: Brand: CONVERTORS

## (undated) DEVICE — GLOVE SRG BIOGEL ECLIPSE 7

## (undated) DEVICE — SYRINGE CATH TIP 50ML

## (undated) DEVICE — ASTOUND STANDARD SURGICAL GOWN, XL: Brand: CONVERTORS

## (undated) DEVICE — NEEDLE 18 G X 1 1/2

## (undated) DEVICE — DRAPE C-ARMOUR

## (undated) DEVICE — GLOVE PI ULTRA TOUCH SZ.7.0

## (undated) DEVICE — SYRINGE 50ML LL

## (undated) DEVICE — SYRINGE 1ML TB 25G X 5/8 NON SAFETY

## (undated) DEVICE — TROCAR: Brand: KII FIOS FIRST ENTRY

## (undated) DEVICE — GLOVE PI ULTRA TOUCH SZ.6.5

## (undated) DEVICE — CHLORAPREP HI-LITE 26ML ORANGE

## (undated) DEVICE — VISUALIZATION SYSTEM: Brand: CLEARIFY

## (undated) DEVICE — ELECTRODE LAP J HOOK SPLIT STEM E-Z CLEAN 33CM -0021S

## (undated) DEVICE — TRAY FOLEY 16FR URIMETER SILICONE SURESTEP

## (undated) DEVICE — DRAPE MICROSCOPE OPMI PENTERO

## (undated) DEVICE — SCD SEQUENTIAL COMPRESSION COMFORT SLEEVE MEDIUM KNEE LENGTH: Brand: KENDALL SCD

## (undated) DEVICE — WOUND PROTECTOR

## (undated) DEVICE — DRAPE EQUIPMENT RF WAND

## (undated) DEVICE — INTENDED FOR TISSUE SEPARATION, AND OTHER PROCEDURES THAT REQUIRE A SHARP SURGICAL BLADE TO PUNCTURE OR CUT.: Brand: BARD-PARKER SAFETY BLADES SIZE 10, STERILE